# Patient Record
Sex: FEMALE | Race: WHITE
[De-identification: names, ages, dates, MRNs, and addresses within clinical notes are randomized per-mention and may not be internally consistent; named-entity substitution may affect disease eponyms.]

---

## 2018-11-19 ENCOUNTER — HOSPITAL ENCOUNTER (INPATIENT)
Dept: HOSPITAL 36 - ER | Age: 83
LOS: 17 days | Discharge: ANOTHER HOSPITAL | DRG: 870 | End: 2018-12-06
Attending: FAMILY MEDICINE | Admitting: FAMILY MEDICINE
Payer: COMMERCIAL

## 2018-11-19 DIAGNOSIS — I47.1: ICD-10-CM

## 2018-11-19 DIAGNOSIS — I13.0: ICD-10-CM

## 2018-11-19 DIAGNOSIS — F03.90: ICD-10-CM

## 2018-11-19 DIAGNOSIS — K27.4: ICD-10-CM

## 2018-11-19 DIAGNOSIS — E78.5: ICD-10-CM

## 2018-11-19 DIAGNOSIS — Z79.4: ICD-10-CM

## 2018-11-19 DIAGNOSIS — I27.20: ICD-10-CM

## 2018-11-19 DIAGNOSIS — Z22.322: ICD-10-CM

## 2018-11-19 DIAGNOSIS — N39.0: ICD-10-CM

## 2018-11-19 DIAGNOSIS — E87.6: ICD-10-CM

## 2018-11-19 DIAGNOSIS — A41.9: Primary | ICD-10-CM

## 2018-11-19 DIAGNOSIS — D64.9: ICD-10-CM

## 2018-11-19 DIAGNOSIS — G93.41: ICD-10-CM

## 2018-11-19 DIAGNOSIS — I50.31: ICD-10-CM

## 2018-11-19 DIAGNOSIS — K56.41: ICD-10-CM

## 2018-11-19 DIAGNOSIS — R13.10: ICD-10-CM

## 2018-11-19 DIAGNOSIS — K44.9: ICD-10-CM

## 2018-11-19 DIAGNOSIS — N18.9: ICD-10-CM

## 2018-11-19 DIAGNOSIS — J96.01: ICD-10-CM

## 2018-11-19 DIAGNOSIS — K80.80: ICD-10-CM

## 2018-11-19 DIAGNOSIS — E11.22: ICD-10-CM

## 2018-11-19 DIAGNOSIS — J98.01: ICD-10-CM

## 2018-11-19 DIAGNOSIS — Z99.11: ICD-10-CM

## 2018-11-19 DIAGNOSIS — J69.0: ICD-10-CM

## 2018-11-19 DIAGNOSIS — R65.20: ICD-10-CM

## 2018-11-19 LAB
ALBUMIN SERPL-MCNC: 3 GM/DL (ref 3.7–5.3)
ALBUMIN/GLOB SERPL: 1 {RATIO} (ref 1–1.8)
ALP SERPL-CCNC: 89 U/L (ref 34–104)
ALT SERPL-CCNC: 8 U/L (ref 7–52)
ANION GAP SERPL CALC-SCNC: 17.5 MMOL/L (ref 7–16)
AST SERPL-CCNC: 9 U/L (ref 13–39)
BILIRUB SERPL-MCNC: 0.2 MG/DL (ref 0.3–1)
BUN SERPL-MCNC: 69 MG/DL (ref 7–25)
CALCIUM SERPL-MCNC: 9.3 MG/DL (ref 8.6–10.3)
CHLORIDE SERPL-SCNC: 97 MEQ/L (ref 98–107)
CO2 SERPL-SCNC: 24.6 MEQ/L (ref 21–31)
CREAT SERPL-MCNC: 1.2 MG/DL (ref 0.6–1.2)
ERYTHROCYTE [DISTWIDTH] IN BLOOD BY AUTOMATED COUNT: 12.7 % (ref 11.5–20)
GLOBULIN SER-MCNC: 2.9 GM/DL
GLUCOSE SERPL-MCNC: 547 MG/DL (ref 70–105)
HCT VFR BLD CALC: 30.5 % (ref 41–60)
HGB BLD-MCNC: 10 GM/DL (ref 12–16)
INR PPP: 1 (ref 0.5–1.4)
LYMPHOCYTES # BLD MANUAL: 8 % (ref 20–50)
MAGNESIUM SERPL-MCNC: 2.2 MG/DL (ref 1.9–2.7)
MCH RBC QN AUTO: 28.8 PG (ref 27–31)
MCHC RBC AUTO-ENTMCNC: 32.7 PG (ref 28–36)
MCV RBC AUTO: 88 FL (ref 81–100)
MONOCYTES # BLD MANUAL: 3 % (ref 2–10)
NEUTROPHILS NFR BLD AUTO: 84 % (ref 40–80)
NEUTS BAND NFR BLD: 5 % (ref 0–10)
PHOSPHATE SERPL-MCNC: 3.5 MG/DL (ref 2.5–5)
PLATELET # BLD: 282 TH/CMM (ref 150–400)
PMV BLD AUTO: 9.4 FL
POTASSIUM SERPL-SCNC: 5.1 MEQ/L (ref 3.5–5.1)
PROTHROMBIN TIME: 10.4 SECONDS (ref 9.5–11.5)
RBC # BLD AUTO: 3.47 MIL/CMM (ref 3.8–5.2)
SODIUM SERPL-SCNC: 134 MEQ/L (ref 136–145)
WBC # BLD AUTO: 16.1 TH/CMM (ref 4.8–10.8)

## 2018-11-19 PROCEDURE — Z7506: HCPCS

## 2018-11-19 PROCEDURE — C9113 INJ PANTOPRAZOLE SODIUM, VIA: HCPCS

## 2018-11-19 PROCEDURE — Z7508: HCPCS

## 2018-11-19 PROCEDURE — Z7610: HCPCS

## 2018-11-19 PROCEDURE — P9016 RBC LEUKOCYTES REDUCED: HCPCS

## 2018-11-19 PROCEDURE — X6026: HCPCS

## 2018-11-19 PROCEDURE — X3401: HCPCS

## 2018-11-19 NOTE — ED PHYSICIAN CHART
ED Chief Complaint/HPI





- Patient Information


Date Seen:: 11/19/18


Time Seen:: 19:45


Chief Complaint:: coffee ground emesis


History of Present Illness:: 





coffee ground emesis


Allergies:: 


 Allergies











Allergy/AdvReac Type Severity Reaction Status Date / Time


 


No Known Allergies Allergy   Verified 11/19/18 19:30











Vitals:: 


 Vital Signs - 8 hr











  11/19/18





  19:45


 


Temp 98.6 F


 





 


RR 26


 


/97


 


O2 Sat % 95











Historian:: Medical Records


Review:: Nurse's Note Reviewed, Transfer documents Reviewed





ED Review of Systems





- Review of Systems


General/Constitutional: No fever, No chills, No weight loss, No weakness, No 

diaphoresis, No edema, No loss of appetite


Skin: No skin lesions, No rash, No bruising


Head: No headache, No light-headedness


Eyes: No loss of vision, No pain, No diplopia


ENT: No earache, No nasal drainage, No sore throat, No tinnitus


Neck: No neck pain, No swelling, No thyromegaly, No stiffness, No mass noted


Cardio Vascular: No chest pain, No palpitations, No PND, No orthopnea, No edema


Pulmonary: No SOB, No cough, No sputum, No wheezing


GI: Vomiting, Hematemesis


G/U: No dysuria, No frequency, No hematuria


Musculoskeletal: No bone or joint pain, No back pain, No muscle pain


Endocrine: No polyuria, No polydipsia


Psychiatric: No prior psych history, No depression, No anxiety, No suicidal 

ideation


Hematopoietic: No bruising, No lymphadenopathy


Allergic/Immuno: No urticaria, No angioedema


Neurological: No syncope, No focal symptoms, No weakness, No paresthesia, No 

headache, No seizure, No dizziness, No confusion, No vertigo





ED Past Medical History





- Past Medical History


Obtainable: No


Past Medical History: HTN, DM, Dyslipidemia, Dementia (anemia), Other (muscle 

weakness)


Psychiatricy History: Dementia





ED Physical Exam





- Physical Examination


General/Constitutional: Awake, Well-developed, well-nourished, Alert, No 

distress, Non-toxic appearing, Ambulatory


Head: Atraumatic


Eyes: Lids, conjuctiva normal, PERRL, EOMI


Skin: Nl inspection, No rash, No skin lesions, No ecchymosis, Well hydrated, No 

lymphadenopathy


ENMT: External ears, nose nl


Neck: Nontender, No stridor


Other Respiratory comments:: 





rhonchi in the right lower lung base only.


Cardio Vascular: RRR, No murmur, gallop, rubs, NL S1 S2


GI: No tenderness/rebounding/guarding, No organomegaly, No hernia, Normal BS's, 

Nondistended, No mass/bruits, No McBurney tenderness


Extremities: No tenderness or effusion, Full ROM, normal strength in all 

extremities, No edema


Neuro/Psych: Normal motor strength, Mood normal, No focal deficits


Misc: Normal back





ED Assessment





- Assessment


General Assessment: 





sign out given to Dr. Hammond at 7:45 p.m.





ED Septic Shock





- .


Is Septic Shock (SBP<90, OR Lactate>4 mmol\L) present?: No





- <6hrs of presentation:


Vital Signs: 


 Vital Signs - 8 hr











  11/19/18





  19:45


 


Temp 98.6 F


 





 


RR 26


 


/97


 


O2 Sat % 95














ED Reassessment (Disposition)





- Reassessment


Reassessment Condition:: Unchanged





- Diagnosis


Diagnosis:: 





Coffee ground emesis.





- Patient Disposition


Discharge/Transfer:: Acute Care w/in this hosp


Condition at Disposition:: Stable, Unchanged

## 2018-11-20 VITALS — SYSTOLIC BLOOD PRESSURE: 145 MMHG | DIASTOLIC BLOOD PRESSURE: 65 MMHG

## 2018-11-20 LAB
ALBUMIN SERPL-MCNC: 2.9 GM/DL (ref 3.7–5.3)
ALBUMIN/GLOB SERPL: 1.1 {RATIO} (ref 1–1.8)
ALP SERPL-CCNC: 74 U/L (ref 34–104)
ALT SERPL-CCNC: 7 U/L (ref 7–52)
ANION GAP SERPL CALC-SCNC: 11.9 MMOL/L (ref 7–16)
AST SERPL-CCNC: 8 U/L (ref 13–39)
BILIRUB SERPL-MCNC: 0.2 MG/DL (ref 0.3–1)
BUN SERPL-MCNC: 78 MG/DL (ref 7–25)
CALCIUM SERPL-MCNC: 8.7 MG/DL (ref 8.6–10.3)
CHLORIDE SERPL-SCNC: 110 MEQ/L (ref 98–107)
CHOLEST SERPL-MCNC: 124 MG/DL (ref ?–200)
CO2 SERPL-SCNC: 26.6 MEQ/L (ref 21–31)
CREAT SERPL-MCNC: 1.1 MG/DL (ref 0.6–1.2)
ERYTHROCYTE [DISTWIDTH] IN BLOOD BY AUTOMATED COUNT: 12.1 % (ref 11.5–20)
GLOBULIN SER-MCNC: 2.7 GM/DL
GLUCOSE SERPL-MCNC: 392 MG/DL (ref 70–105)
HCT VFR BLD CALC: 26.3 % (ref 41–60)
HDLC SERPL-MCNC: 30 MG/DL (ref 23–92)
HGB BLD-MCNC: 8.7 GM/DL (ref 12–16)
LYMPHOCYTES # BLD MANUAL: 16 % (ref 20–50)
MAGNESIUM SERPL-MCNC: 2.3 MG/DL (ref 1.9–2.7)
MCH RBC QN AUTO: 29.3 PG (ref 27–31)
MCHC RBC AUTO-ENTMCNC: 33.3 PG (ref 28–36)
MCV RBC AUTO: 88.2 FL (ref 81–100)
MONOCYTES # BLD MANUAL: 6 % (ref 2–10)
NEUTROPHILS NFR BLD AUTO: 78 % (ref 40–80)
NEUTS BAND NFR BLD: 0 % (ref 0–10)
PLATELET # BLD: 241 TH/CMM (ref 150–400)
PMV BLD AUTO: 9.5 FL
POTASSIUM SERPL-SCNC: 4.5 MEQ/L (ref 3.5–5.1)
RBC # BLD AUTO: 2.98 MIL/CMM (ref 3.8–5.2)
SODIUM SERPL-SCNC: 144 MEQ/L (ref 136–145)
TRIGL SERPL-MCNC: 126 MG/DL (ref ?–150)
WBC # BLD AUTO: 16.8 TH/CMM (ref 4.8–10.8)

## 2018-11-20 RX ADMIN — DEXTROSE AND SODIUM CHLORIDE SCH MLS/HR: 5; .45 INJECTION, SOLUTION INTRAVENOUS at 13:45

## 2018-11-20 RX ADMIN — INSULIN ASPART SCH: 100 INJECTION, SOLUTION INTRAVENOUS; SUBCUTANEOUS at 12:21

## 2018-11-20 RX ADMIN — INSULIN ASPART SCH UNITS: 100 INJECTION, SOLUTION INTRAVENOUS; SUBCUTANEOUS at 06:09

## 2018-11-20 RX ADMIN — INSULIN ASPART SCH UNITS: 100 INJECTION, SOLUTION INTRAVENOUS; SUBCUTANEOUS at 23:36

## 2018-11-20 RX ADMIN — INSULIN ASPART SCH UNITS: 100 INJECTION, SOLUTION INTRAVENOUS; SUBCUTANEOUS at 19:08

## 2018-11-20 RX ADMIN — MORPHINE SULFATE PRN MG: 2 INJECTION, SOLUTION INTRAMUSCULAR; INTRAVENOUS at 15:45

## 2018-11-20 RX ADMIN — MORPHINE SULFATE PRN MG: 2 INJECTION, SOLUTION INTRAMUSCULAR; INTRAVENOUS at 10:25

## 2018-11-20 RX ADMIN — INSULIN ASPART SCH UNITS: 100 INJECTION, SOLUTION INTRAVENOUS; SUBCUTANEOUS at 01:11

## 2018-11-20 NOTE — DIAGNOSTIC IMAGING REPORT
CHEST X-RAY: AP view



INDICATION: aspiration



COMPARISON: None



FINDINGS: Retrocardiac density is noted.  No focal consolidation or

effusions.  Cardiomegaly is noted with atherosclerosis.  Degenerative

changes of the spine are noted with scoliosis.



IMPRESSION:



Retrocardiac density probably representing a large hiatal hernia. 

Please refer to follow-up CT examination the same day for further

details



Cardiomegaly and atherosclerotic vascular disease.

## 2018-11-20 NOTE — GENERAL PROGRESS NOTE
Subjective





- Review of Systems


Service Date: 11/20/18


Events since last encounter: 





11/20/18


chart reviewed


CT scan noted


need GI evaluation, NGT and esophagram and UGI study to determine degree to 

hernia incarceration, EGD





Objective





- Results


Result Diagrams: 


 11/20/18 04:10





 11/20/18 04:10


Recent Labs: 


 Laboratory Last Values











WBC  16.8 Th/cmm (4.8-10.8)  H  11/20/18  04:10    


 


RBC  2.98 Mil/cmm (3.80-5.20)  L  11/20/18  04:10    


 


Hgb  8.7 gm/dL (12-16)  L  11/20/18  04:10    


 


Hct  26.3 % (41.0-60)  L  11/20/18  04:10    


 


MCV  88.2 fl ()   11/20/18  04:10    


 


MCH  29.3 pg (27.0-31.0)   11/20/18  04:10    


 


MCHC Differential  33.3 pg (28.0-36.0)   11/20/18  04:10    


 


RDW  12.1 % (11.5-20.0)   11/20/18  04:10    


 


Plt Count  241 Th/cmm (150-400)   11/20/18  04:10    


 


MPV  9.5 fl  11/20/18  04:10    


 


Add Manual Diff  YES   11/20/18  04:10    


 


Neutrophils %  NP   11/19/18  19:40    


 


Band Neutrophils %  0 % (0-10)   11/20/18  04:10    


 


Lymphocytes %  NP   11/19/18  19:40    


 


Monocytes %  NP   11/19/18  19:40    


 


Eosinophils %  NP   11/19/18  19:40    


 


Basophils %  NP   11/19/18  19:40    


 


Neutrophils (Manual)  78 % (40-80)   11/20/18  04:10    


 


Lymphocytes  16 % (20-50)  L  11/20/18  04:10    


 


Monocytes  6 % (2-10)   11/20/18  04:10    


 


PT  10.4 SECONDS (9.5-11.5)   11/19/18  19:40    


 


INR  1.00  (0.5-1.4)   11/19/18  19:40    


 


PTT (Actin FS)  22.1 SECONDS (26.0-38.0)  L  11/19/18  19:40    


 


Sodium  144 mEq/L (136-145)   11/20/18  04:10    


 


Potassium  4.5 mEq/L (3.5-5.1)   11/20/18  04:10    


 


Chloride  110 mEq/L ()  H  11/20/18  04:10    


 


Carbon Dioxide  26.6 mEq/L (21.0-31.0)   11/20/18  04:10    


 


Anion Gap  11.9  (7.0-16.0)   11/20/18  04:10    


 


BUN  78 mg/dL (7-25)  H  11/20/18  04:10    


 


Creatinine  1.1 mg/dL (0.6-1.2)   11/20/18  04:10    


 


Est GFR ( Amer)  TNP   11/20/18  04:10    


 


Est GFR (Non-Af Amer)  TNP   11/20/18  04:10    


 


BUN/Creatinine Ratio  70.9   11/20/18  04:10    


 


Glucose  392 mg/dL ()  H D 11/20/18  04:10    


 


POC Glucose  304 MG/DL (70 - 105)  H  11/20/18  06:07    


 


Whole Bld Lactic Acid  2.46 mmol/L (0.60-1.99)  H*  11/20/18  10:07    


 


Calcium  8.7 mg/dL (8.6-10.3)   11/20/18  04:10    


 


Phosphorus  3.5 mg/dL (2.5-5.0)   11/19/18  19:40    


 


Magnesium  2.3 mg/dL (1.9-2.7)   11/20/18  04:10    


 


Total Bilirubin  0.2 mg/dL (0.3-1.0)  L  11/20/18  04:10    


 


AST  8 U/L (13-39)  L  11/20/18  04:10    


 


ALT  7 U/L (7-52)   11/20/18  04:10    


 


Alkaline Phosphatase  74 U/L ()   11/20/18  04:10    


 


Total Protein  5.6 gm/dL (6.0-8.3)  L  11/20/18  04:10    


 


Albumin  2.9 gm/dL (3.7-5.3)  L  11/20/18  04:10    


 


Globulin  2.7 gm/dL  11/20/18  04:10    


 


Albumin/Globulin Ratio  1.1  (1.0-1.8)   11/20/18  04:10    


 


Triglycerides  126 mg/dL (<150)   11/20/18  04:10    


 


Cholesterol  124 mg/dL (<200)   11/20/18  04:10    


 


LDL Cholesterol Direct  84 mg/dL ()   11/20/18  04:10    


 


HDL Cholesterol  30 mg/dL (23-92)   11/20/18  04:10    


 


TSH  0.75 uIU/ml (0.34-5.60)   11/20/18  04:10    


 


Blood Type  O POSITIVE   11/19/18  19:40    


 


Antibody Screen  NEGATIVE   11/19/18  19:40    














- Physical Exam


Vitals and I&O: 


 Vital Signs











Temp  96.9 F   11/20/18 10:00


 


Pulse  84   11/20/18 10:00


 


Resp  18   11/20/18 10:00


 


BP  160/58   11/20/18 10:00


 


Pulse Ox  100   11/20/18 10:00








 Intake & Output











 11/19/18 11/20/18 11/20/18





 18:59 06:59 18:59


 


Intake Total  250 


 


Balance  250 


 


Weight (lbs)  64.682 kg 


 


Intake:   


 


  Intake, IV Amount  250 


 


    Azithromycin 500 mg In  250 





    Sodium Chloride 0.9% 250   





    ml @ 250 mls/hr IV Q24HR   





    [facility] Rx#:[id number]   


 


Other:   


 


  # Voids  2 


 


  Stool Characteristics  Brown Brown


 


  Weight Source  Bedscale 











Active Medications: 


Current Medications





Pantoprazole Sodium 80 mg/ (Sodium Chloride)  100 mls @ 10 mls/hr IV Q10H [facility]


   Stop: 01/18/19 19:44


   Last Admin: 11/19/18 22:57 Dose:  10 mls/hr


Azithromycin 500 mg/ Sodium (Chloride)  250 mls @ 250 mls/hr IV Q24HR [facility]


   Stop: 01/18/19 22:29


   Last Infusion: 11/20/18 00:30 Dose:  Infused


Ceftriaxone Sodium 1 gm/ (Sodium Chloride)  50 mls @ 100 mls/hr IV Q24HR [facility]


   Stop: 01/19/19 20:59


Sodium Chloride (Nacl 0.9%)  1,000 mls @ 150 mls/hr IV .Q6H40M [facility]


   Stop: 01/19/19 05:59


   Last Admin: 11/20/18 06:11 Dose:  150 mls/hr


Insulin Aspart (Novolog Insulin Sliding Scale)  0 units SUBQ Q6HR [facility]; Protocol


   Stop: 01/19/19 01:14


   Last Admin: 11/20/18 06:09 Dose:  9 units


Morphine Sulfate (Morphine)  1 mg IVP Q4HR PRN


   PRN Reason: Pain (Moderate)


   Stop: 01/19/19 10:04











Assessment/Plan





- Problem List


Patient Problems: 


All Active Problems





COFFEE GROUND EMESIS (Acute)

## 2018-11-20 NOTE — DIAGNOSTIC IMAGING REPORT
CT abdomen and pelvis without intravenous contrast



Indication: Hematemesis



Comparison: CT chest the same day,



Technique: Axial images were obtained from the lung bases to the

bilateral proximal femurs without IV contrast.  Coronal reconstructions

were made.  total DLP: 4 97,  CTDI10.6



FINDINGS:



Hypoventilatory and atelectatic changes of the lungs are noted.

Evaluation of the solid organs is limited due to lack of IV contrast. 

Large retrocardiac hiatal hernia is noted.  Evaluation of the solid

organs is limited due to lack of IV contrast.  Exam is also mildly

limited due to motion.  No focal hepatic or splenic lesions.  



There is a 4 mm stone along the neck of the gallbladder.  No focal

pancreatic or adrenal lesions.  Exophytic right renal cysts are noted

measuring up to 1.1 cm.  No hydronephrosis or renal stones.  Uterine

calcifications are noted.  



There is marked distal fecal impaction with mass effect upon the uterus

and urinary bladder.  There is copious amount of stool throughout the

colon.  Minimal diverticulosis is noted without evidence of

diverticulitis.  No appendicitis.  No free fluid or free air.  Heavy

atherosclerotic vascular disease is noted.  Severe degenerative changes

of the spine are noted with severe spinal scoliosis.



IMPRESSION:



Large retrocardiac hernia.



Marked distal fecal impaction with mass effect upon the uterus and

urinary bladder.  There is also copious stool throughout the colon. 

Correlate clinically for constipation.



Minimal diverticulosis without evidence of diverticulitis



4 mm stone along the gallbladder neck.  No evidence of surrounding

inflammatory changes.  Consider further assessment with ultrasound.



Severe degenerative changes and spinal scoliosis



Atherosclerotic vascular disease.

## 2018-11-20 NOTE — HISTORY AND PHYSICAL
History of Present Illness





- HPI


Chief Complaint: 





coffee ground emesis  


HPI: 





89 y/o female nursing home resident admitted to the ICU due to 1 day hx of 

coffee ground emesis. In the ER patient was found to have elevated lactic acid, 

sepsis protocol initiated. 


Vital Signs: 





 Last Vital Signs











Temp  98.3 F   11/20/18 20:00


 


Pulse  84   11/20/18 20:00


 


Resp  20   11/20/18 20:00


 


BP  119/86   11/20/18 20:00


 


Pulse Ox  100   11/20/18 20:00














Past Medical History


Other History: 





HTN, DM, Dyslipidemia, Dementia (anemia), Other (muscle weakness)





Family Medical History





- Family Member


  ** Mother


History Unknown: Yes





Social History


Smoke: No


Alcohol: None


Drugs: None


Lives: Nursing Home





- Medications


Home Medications: 


Home Medication











 Medication  Instructions  Recorded  Type


 


Acetaminophen [Tylenol] 650 mg PO Q4H PRN 11/19/18 History


 


Acetaminophen [Tylenol] 650 mg PO Q4H PRN 11/19/18 History


 


Amlodipine Besylate 5 mg PO DAILY 11/19/18 History


 


Ascorbic Acid 500 mg PO DAILY 11/19/18 History


 


Bisacodyl [Dulcolax 10 Mg Supp] 10 mg RC DAILY PRN 11/19/18 History


 


Calcium Carbonate/Vitamin D3 1 each PO DAILY 11/19/18 History





[Calcium 500-Vit D3 200 Caplet]   


 


Clonidine HCl [Catapres] 0.1 mg PO Q8H PRN 11/19/18 History


 


Clopidogrel [Plavix] 75 mg PO DAILY 11/19/18 History


 


Dextrose [Glucose Gel] 15 gm PO PRN PRN 11/19/18 History


 


Docusate Sodium [Colace] 100 mg PO BID 11/19/18 History


 


Ferrous Sulfate 325 mg PO DAILY 11/19/18 History


 


Fleet Enema 135 ml RC DAILY PRN 11/19/18 History


 


Glucagon,Human Recombinant 1 mg IJ PRN PRN 11/19/18 History





[Glucagon Emergency Kit]   


 


Insulin Human Regular [NovoLIN R] 0 units SUBQ DAILY 11/19/18 History


 


Isosorbide Mononitrate [Isosorbide 60 mg PO DAILY 11/19/18 History





Mononitrate ER]   


 


L. Acidophilus/L.bulgaricus 1 each PO DAILY 11/19/18 History





[Floranex Granules Packet]   


 


Lactulose 20 gm PO BID 11/19/18 History


 


Magnesium Hydroxide [Milk of 30 ml PO DAILY PRN 11/19/18 History





Magnesia]   


 


Memantine [Namenda] 5 mg PO DAILY 11/19/18 History


 


Metoprolol Tartrate 25 mg PO DAILY 11/19/18 History


 


Multivitamin w/ Minerals 1 tab PO DAILY 11/19/18 History





[Theragran M]   


 


Mylanta Ultimate Strenght  30 ml PO Q6H PRN 11/19/18 History


 


Nitroglycerin [Nitrostat] 0.4 mg SL Q5MIN PRN 11/19/18 History


 


Pantoprazole [Protonix] 40 mg PO DAILY 11/19/18 History


 


Polyethylene Glycol 3350 17 gm PO DAILY 11/19/18 History


 


Sitagliptin Phosphate [Januvia] 100 mg PO DAILY 11/19/18 History














- Allergies


Allergies/Adverse Reactions: 


 Allergies











Allergy/AdvReac Type Severity Reaction Status Date / Time


 


No Known Allergies Allergy   Verified 11/19/18 19:30














Review of Systems





- Review of Systems


Constitutional: Report: Weakness


Eyes: Report: No Significant


Respiratory: Report: Cough


Cardiovascular: Report: No Significant


Gastrointestinal: Report: Vomiting


Neurological: Report: Weakness





Physical Exam





- Physical Exam


HEENT: Report: Ears Nose Throat within normal limits


Neck: Report: Within normal limits


Cardiovascular Systems: Report: Regular, Rate and Rhythm


Respiratory: Report: Breath Sounds are within normal limits


Abdomen: Report: Non-tender to palpation


Extremities: Report: Non-tender to palpation.


Skin: Report: Color of skin is within normal limits, Warm, Dry


Neuro/Psych: Report: Weakness or sensory loss noted.





- Lab Results


All Lab Results last 24 hours: 





 Laboratory Results - last 24 hr











  11/19/18 11/19/18 11/19/18





  22:32 22:36 22:40


 


WBC   


 


RBC   


 


Hgb   


 


Hct   


 


MCV   


 


MCH   


 


MCHC Differential   


 


RDW   


 


Plt Count   


 


MPV   


 


Add Manual Diff   


 


Band Neutrophils %   


 


Neutrophils (Manual)   


 


Lymphocytes   


 


Monocytes   


 


Sodium   


 


Potassium   


 


Chloride   


 


Carbon Dioxide   


 


Anion Gap   


 


BUN   


 


Creatinine   


 


Est GFR ( Amer)   


 


Est GFR (Non-Af Amer)   


 


BUN/Creatinine Ratio   


 


Glucose   


 


POC Glucose  485 H*  441 H 


 


Whole Bld Lactic Acid    2.89 H*


 


Calcium   


 


Magnesium   


 


Total Bilirubin   


 


AST   


 


ALT   


 


Alkaline Phosphatase   


 


Total Protein   


 


Albumin   


 


Globulin   


 


Albumin/Globulin Ratio   


 


Triglycerides   


 


Cholesterol   


 


LDL Cholesterol Direct   


 


HDL Cholesterol   


 


TSH   














  11/20/18 11/20/18 11/20/18





  00:09 01:00 04:10


 


WBC    16.8 H


 


RBC    2.98 L


 


Hgb    8.7 L


 


Hct    26.3 L


 


MCV    88.2


 


MCH    29.3


 


MCHC Differential    33.3


 


RDW    12.1


 


Plt Count    241


 


MPV    9.5


 


Add Manual Diff    YES


 


Band Neutrophils %    0


 


Neutrophils (Manual)    78


 


Lymphocytes    16 L


 


Monocytes    6


 


Sodium   


 


Potassium   


 


Chloride   


 


Carbon Dioxide   


 


Anion Gap   


 


BUN   


 


Creatinine   


 


Est GFR ( Amer)   


 


Est GFR (Non-Af Amer)   


 


BUN/Creatinine Ratio   


 


Glucose   


 


POC Glucose  423 H  403 H 


 


Whole Bld Lactic Acid   


 


Calcium   


 


Magnesium   


 


Total Bilirubin   


 


AST   


 


ALT   


 


Alkaline Phosphatase   


 


Total Protein   


 


Albumin   


 


Globulin   


 


Albumin/Globulin Ratio   


 


Triglycerides   


 


Cholesterol   


 


LDL Cholesterol Direct   


 


HDL Cholesterol   


 


TSH   














  11/20/18 11/20/18 11/20/18





  04:10 04:10 06:07


 


WBC   


 


RBC   


 


Hgb   


 


Hct   


 


MCV   


 


MCH   


 


MCHC Differential   


 


RDW   


 


Plt Count   


 


MPV   


 


Add Manual Diff   


 


Band Neutrophils %   


 


Neutrophils (Manual)   


 


Lymphocytes   


 


Monocytes   


 


Sodium  144  


 


Potassium  4.5  


 


Chloride  110 H  


 


Carbon Dioxide  26.6  


 


Anion Gap  11.9  


 


BUN  78 H  


 


Creatinine  1.1  


 


Est GFR ( Amer)  TNP  


 


Est GFR (Non-Af Amer)  TNP  


 


BUN/Creatinine Ratio  70.9  


 


Glucose  392 H D  


 


POC Glucose    304 H


 


Whole Bld Lactic Acid   


 


Calcium  8.7  


 


Magnesium  2.3  


 


Total Bilirubin  0.2 L  


 


AST  8 L  


 


ALT  7  


 


Alkaline Phosphatase  74  


 


Total Protein  5.6 L  


 


Albumin  2.9 L  


 


Globulin  2.7  


 


Albumin/Globulin Ratio  1.1  


 


Triglycerides  126  


 


Cholesterol  124  


 


LDL Cholesterol Direct  84  


 


HDL Cholesterol  30  


 


TSH   0.75 














  11/20/18 11/20/18 11/20/18





  07:50 10:07 19:06


 


WBC   


 


RBC   


 


Hgb   


 


Hct   


 


MCV   


 


MCH   


 


MCHC Differential   


 


RDW   


 


Plt Count   


 


MPV   


 


Add Manual Diff   


 


Band Neutrophils %   


 


Neutrophils (Manual)   


 


Lymphocytes   


 


Monocytes   


 


Sodium   


 


Potassium   


 


Chloride   


 


Carbon Dioxide   


 


Anion Gap   


 


BUN   


 


Creatinine   


 


Est GFR ( Amer)   


 


Est GFR (Non-Af Amer)   


 


BUN/Creatinine Ratio   


 


Glucose   


 


POC Glucose    236 H


 


Whole Bld Lactic Acid  2.88 H*  2.46 H* 


 


Calcium   


 


Magnesium   


 


Total Bilirubin   


 


AST   


 


ALT   


 


Alkaline Phosphatase   


 


Total Protein   


 


Albumin   


 


Globulin   


 


Albumin/Globulin Ratio   


 


Triglycerides   


 


Cholesterol   


 


LDL Cholesterol Direct   


 


HDL Cholesterol   


 


TSH   








 Microbiology











 11/19/18 19:40  - Preliminary





 Blood    NO GROWTH AFTER 24 HOURS














- Assessment


Assessment: 





 Current Active Problems











Problem Status Onset


 


COFFEE GROUND EMESIS Acute 








 r/o gi bleed 


Large hiatal hernia


lactic acidosis possible sepsis


htn


 DM


Dyslipidemia


Dementia 





- Plan


Plan: 





id consult


surgical consult


ivabx as ordered


gi consult 


cpm

## 2018-11-20 NOTE — DIAGNOSTIC IMAGING REPORT
CT Chest without IV contrast



HISTORY: Pneumonia



COMPARISON: CT abdomen and pelvis the same day.



Technique: Axial images were obtained from the base of the neck to the

upper abdomen without IV contrast.  Reconstructions were made.  Total

, CTDI 8.4



Findings:



Exam is limited due to lack of IV contrast.  No evidence of mediastinal

lymphadenopathy.  Motion also limits exam.  Extensive atherosclerosis is

noted with coronary artery calcifications.  No aneurysm.  Heart size is

at the upper limits of normal.  No pericardial effusion.  There is a

large retrocardiac hiatal hernia.



The lung fields demonstrate chronic lung changes with hypoventilatory

and atelectatic changes.  There is focal area of consolidative change

involving the posterior aspect of the right lung base.  Small focal left

basal consolidative changes noted.  No pleural effusions.  Degenerative

changes of the spine are noted with marked scoliosis and rightward

convexity of the thoracic spine.



IMPRESSION:



Focal right posterior basal consolidative changes probably related to

pneumonia.  Short-term follow-up is recommended to ensure resolution and

rule out left leg neoplastic process



Additional minimal left basal consolidative changes which may also be

related to focal infiltrate/pneumonia.  Again follow-up is needed to

ensure resolution.



Large retrocardiac hiatal hernia



Heavy atherosclerotic vascular disease.

## 2018-11-20 NOTE — CONSULTATION
DATE OF CONSULTATION:     11/20/2018



INFECTIOUS DISEASE CONSULTATION



REFERRING PHYSICIAN:   Zack Ivey MD



REASON FOR CONSULTATION:  Sepsis, pneumonia.



HISTORY OF PRESENT ILLNESS:  The patient is an 88-year-old female with a past

medical history of hypertension, muscle weakness, dysphagia, and diabetes

mellitus type 2, brought in from [facility] for vomiting

coffee-ground emesis.  On initial evaluation, the patient's temperature was 98.6

degrees Fahrenheit and WBC count was 16,000.  Sepsis workup was performed and

lactic acid was elevated.  ID consult was called for further evaluation and

management.  Meanwhile, CT scan of the chest reveal a large hiatal hernia along

with bilateral pneumonia.  Antibiotic wise, the patient was started on Rocephin

and Zithromax.



PAST MEDICAL HISTORY:  As mentioned above, hypertension, diabetes mellitus type

2, muscle weakness, dysphagia.



ALLERGIES:  NKDA.



MEDICATIONS:  As per medication reconciliation sheet.  Antibiotic wise, the

patient is receiving Rocephin and Zithromax.



FAMILY HISTORY:  Not available.



SOCIAL HISTORY:  The patient lives in a nursing facility.  No history of

smoking, alcohol or drug use.



REVIEW OF SYSTEMS:  The patient is a poor historian, unable to give any history.

 The patient might have dementia.



PHYSICAL EXAMINATION:

VITAL SIGNS:  Shows temperature is 96.9, pulse 84, respirations 18, blood

pressure 160/58.

GENERAL:  The patient is comfortable, lying in the bed, not in acute distress.

HEENT:  Head is normocephalic and atraumatic.  Oral cavity moist, pink tongue. 

Eyes:  Pallor is present, no icterus.  PERRLA, EOMI.

NECK:  Supple, no JVD, no carotid bruit.  Trachea in midline.

CHEST:  Bilateral breath sounds.  No crackles or wheezing.  Decreased breath

sounds.

HEART:  S1, S2 within normal limits.  Regular rhythm.  No murmur, no gallop.

ABDOMEN:  Soft, nontender, nondistended.  Bowel sounds present.

EXTREMITIES:  No cyanosis, no clubbing, no edema.

NEUROLOGIC:  Arousable, but unable to communicate at this time.



LABORATORY DATA:  Current lab shows WBC count is 16,800, hemoglobin 8.7,

hematocrit 26.3, platelets are 241,000, neutrophils 78%, lymphocytes 16%. 

Sodium 144, potassium 4.5, chloride 110, bicarbonate is 26.6, BUN is 78,

creatinine 1.1, glucose is 392.  Lactic acid is 2.46.  CT scan of the chest,

abdomen and pelvis are reviewed and it showed bilateral consolidation and large

left retrocardiac hiatal hernia.  The patient has a 4 mm stone along the

gallbladder neck.  No evidence of surrounding inflammatory changes.  Marked

distal fecal impaction with mass effect.



IMPRESSION:

1.  Severe sepsis.

2.  Pneumonia.

3.  Fecal impaction.

4.  Gallbladder stone in bladder neck.

5.  Diabetes mellitus type 2.

6.  Hypertension.

7.  Dementia.

8.  Anemia.



RECOMMENDATIONS:  Continue Zithromax.  Change Rocephin to Zosyn. 

Depending on the culture report and available data,  define further

antibiotic therapy.



Thank you, Dr. Ivey for involving me in taking care of this patient.





DD: 11/20/2018 11:45

DT: 11/20/2018 23:32

JOB# [id number]  [id number]

GREGORY

## 2018-11-20 NOTE — DIAGNOSTIC IMAGING REPORT
CHEST X-RAY: AP view



INDICATION: NG tube placement



COMPARISON: Chest x-ray and chest CT on 11/19/2019



FINDINGS: An NG tube is seen curled in a large hiatal hernia.  No focal

consolidation or effusions.  Cardiomegaly is noted with atherosclerosis.



IMPRESSION:



NG tube curled in the large retrocardiac hiatal hernia.  Clinical

correlation is needed.



No focal consolidation identified.

## 2018-11-21 LAB
ALBUMIN SERPL-MCNC: 2.6 GM/DL (ref 3.7–5.3)
ALBUMIN/GLOB SERPL: 1.1 {RATIO} (ref 1–1.8)
ALP SERPL-CCNC: 55 U/L (ref 34–104)
ALT SERPL-CCNC: 8 U/L (ref 7–52)
ANION GAP SERPL CALC-SCNC: 9.8 MMOL/L (ref 7–16)
AST SERPL-CCNC: 28 U/L (ref 13–39)
BASOPHILS NFR BLD: 1 % (ref 0–3)
BILIRUB SERPL-MCNC: 0.2 MG/DL (ref 0.3–1)
BUN SERPL-MCNC: 45 MG/DL (ref 7–25)
CALCIUM SERPL-MCNC: 8.4 MG/DL (ref 8.6–10.3)
CHLORIDE SERPL-SCNC: 115 MEQ/L (ref 98–107)
CO2 SERPL-SCNC: 26.1 MEQ/L (ref 21–31)
CREAT SERPL-MCNC: 1.1 MG/DL (ref 0.6–1.2)
EOSINOPHIL NFR BLD: 3 % (ref 0–5)
ERYTHROCYTE [DISTWIDTH] IN BLOOD BY AUTOMATED COUNT: 12.7 % (ref 11.5–20)
GLOBULIN SER-MCNC: 2.4 GM/DL
GLUCOSE SERPL-MCNC: 287 MG/DL (ref 70–105)
HCT VFR BLD CALC: 21.1 % (ref 41–60)
HGB BLD-MCNC: 7.1 GM/DL (ref 12–16)
INR PPP: 1.02 (ref 0.5–1.4)
LYMPHOCYTES # BLD MANUAL: 27 % (ref 20–50)
MAGNESIUM SERPL-MCNC: 2.2 MG/DL (ref 1.9–2.7)
MCH RBC QN AUTO: 29.3 PG (ref 27–31)
MCHC RBC AUTO-ENTMCNC: 33.5 PG (ref 28–36)
MCV RBC AUTO: 87.7 FL (ref 81–100)
MONOCYTES # BLD MANUAL: 6 % (ref 2–10)
NEUTROPHILS NFR BLD AUTO: 58 % (ref 40–80)
NEUTS BAND NFR BLD: 5 % (ref 0–10)
PHOSPHATE SERPL-MCNC: 2.3 MG/DL (ref 2.5–5)
PLATELET # BLD EST: ADEQUATE 10*3/UL
PLATELET # BLD: 257 TH/CMM (ref 150–400)
PMV BLD AUTO: 8.7 FL
POTASSIUM SERPL-SCNC: 3.9 MEQ/L (ref 3.5–5.1)
PROTHROMBIN TIME: 10.6 SECONDS (ref 9.5–11.5)
RBC # BLD AUTO: 2.4 MIL/CMM (ref 3.8–5.2)
SODIUM SERPL-SCNC: 147 MEQ/L (ref 136–145)
WBC # BLD AUTO: 14.9 TH/CMM (ref 4.8–10.8)

## 2018-11-21 PROCEDURE — 0DB78ZX EXCISION OF STOMACH, PYLORUS, VIA NATURAL OR ARTIFICIAL OPENING ENDOSCOPIC, DIAGNOSTIC: ICD-10-PCS

## 2018-11-21 PROCEDURE — 0W3P8ZZ CONTROL BLEEDING IN GASTROINTESTINAL TRACT, VIA NATURAL OR ARTIFICIAL OPENING ENDOSCOPIC: ICD-10-PCS

## 2018-11-21 RX ADMIN — DEXTROSE AND SODIUM CHLORIDE SCH MLS/HR: 5; .45 INJECTION, SOLUTION INTRAVENOUS at 01:19

## 2018-11-21 RX ADMIN — IPRATROPIUM BROMIDE AND ALBUTEROL SULFATE SCH ML: .5; 3 SOLUTION RESPIRATORY (INHALATION) at 22:38

## 2018-11-21 RX ADMIN — INSULIN ASPART SCH: 100 INJECTION, SOLUTION INTRAVENOUS; SUBCUTANEOUS at 06:10

## 2018-11-21 RX ADMIN — INSULIN ASPART SCH UNITS: 100 INJECTION, SOLUTION INTRAVENOUS; SUBCUTANEOUS at 17:35

## 2018-11-21 RX ADMIN — DEXTROSE AND SODIUM CHLORIDE SCH MLS/HR: 5; .45 INJECTION, SOLUTION INTRAVENOUS at 11:39

## 2018-11-21 RX ADMIN — DEXTROSE AND SODIUM CHLORIDE SCH MLS/HR: 5; .45 INJECTION, SOLUTION INTRAVENOUS at 17:34

## 2018-11-21 RX ADMIN — Medication SCH: at 09:00

## 2018-11-21 RX ADMIN — MORPHINE SULFATE PRN MG: 2 INJECTION, SOLUTION INTRAMUSCULAR; INTRAVENOUS at 12:24

## 2018-11-21 RX ADMIN — INSULIN ASPART SCH UNITS: 100 INJECTION, SOLUTION INTRAVENOUS; SUBCUTANEOUS at 12:59

## 2018-11-21 RX ADMIN — IPRATROPIUM BROMIDE AND ALBUTEROL SULFATE SCH ML: .5; 3 SOLUTION RESPIRATORY (INHALATION) at 18:31

## 2018-11-21 RX ADMIN — IPRATROPIUM BROMIDE AND ALBUTEROL SULFATE SCH ML: .5; 3 SOLUTION RESPIRATORY (INHALATION) at 14:46

## 2018-11-21 NOTE — INFECTIOUS DISEASE PROG NOTE
Infectious Disease Subjective





- Review of Systems


Service Date: 18


Subjective: 





NO new change, no fever.





Infectious Disease Objective





- Results


Result Diagrams: 


 18 04:45





 18 04:45


Recent Labs: 


 Laboratory Last Values











WBC  14.9 Th/cmm (4.8-10.8)  H  18  04:45    


 


RBC  2.40 Mil/cmm (3.80-5.20)  L  18  04:45    


 


Hgb  7.1 gm/dL (12-16)  L*  18  04:45    


 


Hct  21.1 % (41.0-60)  L  18  04:45    


 


MCV  87.7 fl ()   18  04:45    


 


MCH  29.3 pg (27.0-31.0)   18  04:45    


 


MCHC Differential  33.5 pg (28.0-36.0)   18  04:45    


 


RDW  12.7 % (11.5-20.0)   18  04:45    


 


Plt Count  257 Th/cmm (150-400)   18  04:45    


 


MPV  8.7 fl  18  04:45    


 


Add Manual Diff  YES   18  04:45    


 


Neutrophils %  NP   18  19:40    


 


Band Neutrophils %  5 % (0-10)   18  04:45    


 


Lymphocytes %  NP   18  19:40    


 


Monocytes %  NP   18  19:40    


 


Eosinophils %  NP   18  19:40    


 


Basophils %  NP   18  19:40    


 


Neutrophils (Manual)  58 % (40-80)   18  04:45    


 


Lymphocytes  27 % (20-50)   18  04:45    


 


Monocytes  6 % (2-10)   18  04:45    


 


Eosinophils  3 % (0-5)   18  04:45    


 


Basophils  1 % (0-3)   18  04:45    


 


Platelet Estimate  ADEQUATE  (NORMAL)   18  04:45    


 


PT  10.6 SECONDS (9.5-11.5)   18  04:45    


 


INR  1.02  (0.5-1.4)   18  04:45    


 


PTT (Actin FS)  22.1 SECONDS (26.0-38.0)  L  18  19:40    


 


Sodium  147 mEq/L (136-145)  H  18  04:45    


 


Potassium  3.9 mEq/L (3.5-5.1)   18  04:45    


 


Chloride  115 mEq/L ()  H  18  04:45    


 


Carbon Dioxide  26.1 mEq/L (21.0-31.0)   18  04:45    


 


Anion Gap  9.8  (7.0-16.0)   18  04:45    


 


BUN  45 mg/dL (7-25)  H  18  04:45    


 


Creatinine  1.1 mg/dL (0.6-1.2)   18  04:45    


 


Est GFR ( Amer)  TNP   18  04:45    


 


Est GFR (Non-Af Amer)  TNP   18  04:45    


 


BUN/Creatinine Ratio  40.9   18  04:45    


 


Glucose  287 mg/dL ()  H  18  04:45    


 


POC Glucose  314 MG/DL (70 - 105)  H  18  17:00    


 


Whole Bld Lactic Acid  2.46 mmol/L (0.60-1.99)  H*  18  10:07    


 


Calcium  8.4 mg/dL (8.6-10.3)  L  18  04:45    


 


Phosphorus  2.3 mg/dL (2.5-5.0)  L  18  04:45    


 


Magnesium  2.2 mg/dL (1.9-2.7)   18  04:45    


 


Total Bilirubin  0.2 mg/dL (0.3-1.0)  L  18  04:45    


 


AST  28 U/L (13-39)   18  04:45    


 


ALT  8 U/L (7-52)   18  04:45    


 


Alkaline Phosphatase  55 U/L ()   18  04:45    


 


Total Protein  5.0 gm/dL (6.0-8.3)  L  18  04:45    


 


Albumin  2.6 gm/dL (3.7-5.3)  L  18  04:45    


 


Globulin  2.4 gm/dL  18  04:45    


 


Albumin/Globulin Ratio  1.1  (1.0-1.8)   18  04:45    


 


Triglycerides  126 mg/dL (<150)   18  04:10    


 


Cholesterol  124 mg/dL (<200)   18  04:10    


 


LDL Cholesterol Direct  84 mg/dL ()   18  04:10    


 


HDL Cholesterol  30 mg/dL (23-92)   18  04:10    


 


TSH  0.75 uIU/ml (0.34-5.60)   18  04:10    


 


Blood Type  O POSITIVE   18  19:40    


 


Antibody Screen  NEGATIVE   18  19:40    














- Physical Exam


Vitals and I&O: 


 Vital Signs











Temp  98.3 F   18 20:00


 


Pulse  98   18 22:38


 


Resp  13   18 22:38


 


BP  151/59   18 22:00


 


Pulse Ox  97   18 22:38








 Intake & Output











 18





 06:59 18:59 06:59


 


Intake Total 1968.333 500.000 50


 


Output Total 850 1000 


 


Balance 1118.333 -500.000 50


 


Weight (lbs) 65.181 kg 64.864 kg 


 


Intake:   


 


  Intake, IV Amount 1968.333 500.000 50


 


    Azithromycin 500 mg In 250  





    Sodium Chloride 0.9% 250   





    ml @ 250 mls/hr IV Q24HR   





    PATRICE Rx#:[id number]   


 


    D5-0.45NS 1,000 ml @ 100 1468.333 400 





    mls/hr IV .Q10H PAT[facility]ICE Rx#:   





    [id number]   


 


    Pantoprazole 80 mg In 200 100.000 





    Sodium Chloride 0.9% 100   





    ml @ 10 mls/hr IV Q10H   





    PATRICE Rx#:[id number]   


 


    cefTRIAXone 1 gm In 50  50





    Sodium Chloride 0.9% 50   





    ml @ 100 mls/hr IV Q24HR   





    PATRICE Rx#:[id number]   


 


Output:   


 


  Urine 850 1000 


 


Other:   


 


  # Bowel Movements  2 


 


  Weight Source Bedscale Bedscale 











Active Medications: 


Current Medications





Acetaminophen (Tylenol)  650 mg PO Q4HR PRN


   PRN Reason: MILD PAIN 1-3


   Stop: 19 17:58


Acetaminophen (Tylenol)  650 mg PO Q4HR PRN


   PRN Reason: TEMP >100.4


   Stop: 19 17:58


Albuterol/Ipratropium (Duoneb Neb)  3 ml HHN Q4HRT [facility]


   Stop: 19 14:59


   Last Admin: 18 22:38 Dose:  3 ml


Amlodipine Besylate (Norvasc)  5 mg PO DAILY [facility]


   Stop: 19 08:59


   Last Admin: 18 09:00 Dose:  Not Given


Ascorbic Acid (Vitamin C)  500 mg PO DAILY [facility]


   Stop: 19 08:59


   Last Admin: 18 09:00 Dose:  Not Given


Bisacodyl (Dulcolax 10 Mg Supp)  10 mg RC DAILY PRN


   PRN Reason: IF MOM INEFFECTIVE


   Stop: 19 17:58


Pantoprazole Sodium 80 mg/ (Sodium Chloride)  100 mls @ 10 mls/hr IV Q10H [facility]


   Stop: 19 19:44


   Last Admin: 18 18:07 Dose:  10 mls/hr


Azithromycin 500 mg/ Sodium (Chloride)  250 mls @ 250 mls/hr IV Q24HR [facility]


   Stop: 19 22:29


   Last Admin: 18 22:59 Dose:  250 mls/hr


Ceftriaxone Sodium 1 gm/ (Sodium Chloride)  50 mls @ 100 mls/hr IV Q24HR [facility]


   Stop: 19 20:59


   Last Infusion: 18 22:05 Dose:  Infused


Dextrose/Sodium Chloride (D5-0.45ns)  1,000 mls @ 50 mls/hr IV .Q20H [facility]


   Stop: 19 17:29


   Last Admin: 18 17:34 Dose:  50 mls/hr


Insulin Aspart (Novolog Insulin Sliding Scale)  0 units SUBQ Q6HR [facility]; Protocol


   Stop: 19 01:14


   Last Admin: 18 17:35 Dose:  9 units


Lactobacillus Rhamnosus (Culturelle 15b)  1 each PO DAILY [facility]


   Stop: 19 08:59


   Last Admin: 18 09:00 Dose:  Not Given


Miscellaneous (Probiotic Screen)  1 ea MC PRN PRN


   PRN Reason: PROTOCOL


   Stop: 19 15:29


Morphine Sulfate (Morphine)  1 mg IVP Q4HR PRN


   PRN Reason: Pain (Moderate)


   Stop: 19 10:04


   Last Admin: 18 12:24 Dose:  1 mg


Mupirocin (Bactroban Oint)  1 appl NS BID [facility]


   Stop: 18 17:01








General: no acute distress, well developed, well nourished


HEENT: atraumatic, normocephalic, PERRLA


Neck: supple, no thyromegaly


Cardiovascular: S1S2, regular


Lungs: clear to auscultation bilaterally, clear to percussion


Abdomen: soft, no tender, no distended


Extremities: no cyanosis, no clubbing, no edema


Neurological: awake, alert, oriented


Skin: intact





Infectious Disease Assmt/Plan





- Problem List


Patient Problems: 


All Active Problems





COFFEE GROUND EMESIS (Acute) 











- Assessment


Assessment: 





1.  Severe sepsis.


2.  Pneumonia.


3.  Fecal impaction.


4.  Gallbladder stone at bladder neck.


5.  Diabetes mellitus type 2.


6.  Hypertension.


7.  Dementia.


8.  Anemia.


9.  Large hiatal hernia.


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 


11. GI bleed 2/2 PUD.








- Plan


Plan: 





Conintue zosyn and zithromax


Start vancomycin IV


Bactroban nasal ointment.





Nutritional Asmnt/Malnutr-PDOC





- Dietary Evaluation


Malnutrition Findings (Please click <Entered> for more info): 








Nutritional Asmnt/Malnutrition                             Start:  18 15:

21


Text:                                                      Status: Complete    

  


Freq:                                                                          

  


Protocol:                                                                      

  


 Document     18 15:22  [facility]  (Rec: 18 15:44  [facility] MADRIGALDIET)


 Nutritional Asmnt/Malnutrition


     Patient General Information


      Nutritional Screening                      High Risk


      Diagnosis                                  sepsis, dehydration, pneumonia


      Pertinent Medical Hx/Surgical Hx           HTN, DM, dyslipidemia,


                                                 dementia, anemia, muscle


                                                 weakness


      Subjective Information                     Pt receiving care from RN at


                                                 time of visit. Per nurse notes


                                                 , pt had coffee ground emesis,


                                                 NGT was unable to be inserted


                                                 d/t large retro cardiac


                                                 hiatal hernia, and will have


                                                 EGD tomorrow. Spoke w/ LEVON Lebron by phone who verfied


                                                 the nurse notes and states pt


                                                 will continue to be NPO.


      Current Diet Order/ Nutrition Support      NPO


      Pertinent Medications                      D5-0.45ns, novolog, culturelle


                                                 , pantoprazole


      Pertinent Labs                             : glucose 392, Na 144, Cl


                                                 110, BUN 78, Alb 2.9, 


                                                 -423


                                                 : glucose 547, Na 134, Cl


                                                 97, BUN 69, Alb 3.0, -


                                                 485


     Nutritional Hx/Data


      Height                                     1.63 m


      Height (Calculated Centimeters)            162.6


      Current Weight (lbs)                       64.682 kg


      Weight (Calculated Kilograms)              64.7


      Weight (Calculated Grams)                  32384.3


      Ideal Body Weight                          120 lb


      Body Mass Index (BMI)                      24.5


      Weight Status                              Approriate


     GI Symptoms


      GI Symptoms                                Nausea


                                                 Vomitting


      Last BM                                    none noted


      Difficult in:                              None


      Food Allergies                             No


      Skin Integrity/Comment:                    intact, angelica 13


      Current %PO                                Negligible < 25%


     Estimated Nutritional Goals


      BEE in Kcals:                              Using Current wt


      Calories/Kcals/Kg                          30-35


      Kcals Calculated                           9443-2230


      Protein:                                   Using Current wt


      Protein g/k.2-1.5


      Protein Calculated                         78-97 g


      Fluid: ml                                  per MD


     Nutritional Problem


      2. Problem


       Problem                                   Altered nutrition related lab


                                                 values


       Etiology                                  dehydration, endocrine


                                                 dysfunction


       Signs/Symptoms:                           Cl 110, BUN 78, glucose 392,


                                                 -423


      1. Problem


       Problem                                   Increased nutrient needs


       Etiology                                  metabolic stress


       Signs/Symptoms:                           dx of sepsis and pneumonia


     Malnutrition Related to Morbid Obesity


      Malnutrition related to morbid obesity     No


     Intervention/Recommendation


      Comments                                   1. Monitor NPO status and


                                                 advance diet when medically


                                                 appropriate


                                                 2. Consider alternative


                                                 nutrition route if pt unable


                                                 to tolerate PO intake


                                                 3. Monitor wt, nutrition


                                                 related labs, and skin


                                                 integrity


                                                 4. F/U as high risk in 2-3


                                                 days, -


     Expected Outcomes/Goals


      Expected Outcomes/Goals                    1. Pt to start oral intake or


                                                 alternate nutrition route if


                                                 necessary


                                                 2. Wt stability, skin to


                                                 remain intact, nutrition


                                                 related labs to approach


                                                 normal limits


                                                 Reveiwed by Lisa Haas RD

## 2018-11-21 NOTE — DIAGNOSTIC IMAGING REPORT
CHEST X-RAY: AP view



INDICATION: NG tube placement



COMPARISON: Chest x-ray 11/20/2018



FINDINGS: NG tube is in place with tip in the stomach.  Congestive

changes seen small left effusion.  Cardiomegaly is noted.  Copious stool

is seen throughout the colon with distal fecal impaction and gas-filled

loops of bowel.



IMPRESSION:



NG tube within the stomach.



Copious stool with gas-filled bowel loops.  Distal fecal impaction is

noted..  Please correlate clinically for constipation and ileus.



Congestive changes and small left effusion.  Pneumonia of the left base

cannot be excluded.

## 2018-11-21 NOTE — OPERATIVE REPORT
DATE OF SURGERY:  11/21/2018



PROCEDURE PERFORMED:  EGD with biopsy, EGD with control of bleeding, and EGD

with NG tube insertion.



ENDOSCOPIST:  Harsha Webb M.D.



PREOPERATIVE DIAGNOSES:  Hiatal hernia, coffee-ground emesis, dysphagia.



POSTOPERATIVE DIAGNOSES:  Hiatal hernia, possible Ezequiel's erosions versus

ulcer within the hernia sac, status post clips.



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

facility for coffee-ground emesis as well as pneumonia.  NG tube was tried to be

inserted at bedside, but it was coiling in the hiatal hernia and this EGD is

desired for both evaluation of the coffee ground emesis and insert an NG tube.



CONSENT:  Informed consent was obtained from the patient's conservator.  The

risks and benefits of the procedure were discussed including but not limited to

infection, bleeding, perforation, need for surgery, cardiopulmonary

complications, missed pathology and death.  The patient's healthcare proxy

indicate that they understood these risks, which go for the procedure and signed

the consent form.



ANESTHESIA:  Anesthesia was provided by the anesthesiologist in the main

operating room.  Intubation was not performed during this procedure.



PROCEDURE IN DETAIL:  The patient was hooked up to the appropriate monitoring

devices including blood pressure, pulse and pulse oximetry.  An IV was in place.

 Anesthesia was then administered by an anesthesiologist.  The patient was in

the left lateral decubitus position and a mouthpiece inserted and secured. 

Next, a gastroscope introduced in the mouth and guided under direct

visualization into the esophagus, stomach and duodenum.  The scope was slowly

and carefully withdrawn, making sure to examine mucosa and careful and

systematic fashion.  Retroflexion was performed in the stomach prior to scope

straightening and withdrawal from the body.  No obvious sign of complication was

observed within the procedure.



FINDINGS:

ESOPHAGUS:  The diaphragmatic pinch was located at 43 cm from the incisors while

the endogastric fold was located at 35 cm from the incisors.  This indicates a

7-8 cm hiatal hernia.  Within the hernia sac itself, there did appear to be a

tear or possible ulcer.  This may be from previous NG tube insertion attempts. 

I did clip this with 2 clips and seemed to close the defect.  There was no

active bleeding at the end of the clipping.  Additionally, I was able to

endoscopically place an NG tube into the stomach.  This was difficult and the NG

tube had to be snared and then guided endoscopically through the hernia sac,

which was quite large end of the stomach.  At the end of the procedure, the NG

tube was within the stomach.

STOMACH:  There is no ulcer or mass lesion within the stomach.  The GE junction

showed Hill grade 4 anatomy on retroflexed view.  A biopsy was taken from the

antrum for MERCEDEZ testing.

DUODENUM:  The duodenal bulb and second portion appeared endoscopically normal.



IMPRESSION:

1.  A 7-8 cm hiatal hernia, which had a mucosal tear that was clipped.

2.  Insertion of an NG tube endoscopically into the stomach.



RECOMMENDATIONS:

1.  We can start the feedings, although I would prefer to get a KUB to confirm

the NG tube is in the stomach after the procedure and the patient returned to

her room.

2.  Continue the Protonix drip for one more day and then can be adjusted to

twice daily dosing.

3.  If the patient does not regain ability to swallow in the future, we may

consider a G-tube insertion as I do not think this could be possible given the

anatomy of what I saw today on this EGD.

4.  Defer correction of the patient's hernia to Dr. Abbott if the patient is a

surgical candidate.



Thank you for allowing me to participate in her care.  Please call with any

further questions.





DD: 11/21/2018 09:08

DT: 11/21/2018 12:34

JOB# [id number]  [id number]

## 2018-11-21 NOTE — INTERNAL MEDICINE PROG NOTE
Internal Medicine Subjective





- Subjective


Patient seen and examined:: chart reviewed


Patient is:: other (weak)


Per staff patient has:: no adverse event, tolerating meds





Internal Medicine Objective





- Results


Result Diagrams: 


 18 04:45





 18 04:45


Recent Labs: 


 Laboratory Last Values











WBC  14.9 Th/cmm (4.8-10.8)  H  18  04:45    


 


RBC  2.40 Mil/cmm (3.80-5.20)  L  18  04:45    


 


Hgb  7.1 gm/dL (12-16)  L*  18  04:45    


 


Hct  21.1 % (41.0-60)  L  18  04:45    


 


MCV  87.7 fl ()   18  04:45    


 


MCH  29.3 pg (27.0-31.0)   18  04:45    


 


MCHC Differential  33.5 pg (28.0-36.0)   18  04:45    


 


RDW  12.7 % (11.5-20.0)   18  04:45    


 


Plt Count  257 Th/cmm (150-400)   18  04:45    


 


MPV  8.7 fl  18  04:45    


 


Add Manual Diff  YES   18  04:45    


 


Neutrophils %  NP   18  19:40    


 


Band Neutrophils %  5 % (0-10)   18  04:45    


 


Lymphocytes %  NP   18  19:40    


 


Monocytes %  NP   18  19:40    


 


Eosinophils %  NP   18  19:40    


 


Basophils %  NP   18  19:40    


 


Neutrophils (Manual)  58 % (40-80)   18  04:45    


 


Lymphocytes  27 % (20-50)   18  04:45    


 


Monocytes  6 % (2-10)   18  04:45    


 


Eosinophils  3 % (0-5)   18  04:45    


 


Basophils  1 % (0-3)   18  04:45    


 


Platelet Estimate  ADEQUATE  (NORMAL)   18  04:45    


 


PT  10.6 SECONDS (9.5-11.5)   18  04:45    


 


INR  1.02  (0.5-1.4)   18  04:45    


 


PTT (Actin FS)  22.1 SECONDS (26.0-38.0)  L  18  19:40    


 


Sodium  147 mEq/L (136-145)  H  18  04:45    


 


Potassium  3.9 mEq/L (3.5-5.1)   18  04:45    


 


Chloride  115 mEq/L ()  H  18  04:45    


 


Carbon Dioxide  26.1 mEq/L (21.0-31.0)   18  04:45    


 


Anion Gap  9.8  (7.0-16.0)   18  04:45    


 


BUN  45 mg/dL (7-25)  H  18  04:45    


 


Creatinine  1.1 mg/dL (0.6-1.2)   18  04:45    


 


Est GFR ( Amer)  TNP   18  04:45    


 


Est GFR (Non-Af Amer)  TNP   18  04:45    


 


BUN/Creatinine Ratio  40.9   18  04:45    


 


Glucose  287 mg/dL ()  H  18  04:45    


 


POC Glucose  270 MG/DL (70 - 105)  H  18  06:09    


 


Whole Bld Lactic Acid  2.46 mmol/L (0.60-1.99)  H*  18  10:07    


 


Calcium  8.4 mg/dL (8.6-10.3)  L  18  04:45    


 


Phosphorus  2.3 mg/dL (2.5-5.0)  L  18  04:45    


 


Magnesium  2.2 mg/dL (1.9-2.7)   18  04:45    


 


Total Bilirubin  0.2 mg/dL (0.3-1.0)  L  18  04:45    


 


AST  28 U/L (13-39)   18  04:45    


 


ALT  8 U/L (7-52)   18  04:45    


 


Alkaline Phosphatase  55 U/L ()   18  04:45    


 


Total Protein  5.0 gm/dL (6.0-8.3)  L  18  04:45    


 


Albumin  2.6 gm/dL (3.7-5.3)  L  18  04:45    


 


Globulin  2.4 gm/dL  18  04:45    


 


Albumin/Globulin Ratio  1.1  (1.0-1.8)   18  04:45    


 


Triglycerides  126 mg/dL (<150)   18  04:10    


 


Cholesterol  124 mg/dL (<200)   18  04:10    


 


LDL Cholesterol Direct  84 mg/dL ()   18  04:10    


 


HDL Cholesterol  30 mg/dL (23-92)   18  04:10    


 


TSH  0.75 uIU/ml (0.34-5.60)   18  04:10    


 


Blood Type  O POSITIVE   18  19:40    


 


Antibody Screen  NEGATIVE   18  19:40    














- Physical Exam


Vitals and I&O: 


 Vital Signs











Temp  97.6 F   18 08:00


 


Pulse  111   18 11:00


 


Resp  16   18 11:00


 


BP  148/55   18 11:00


 


Pulse Ox  100   18 11:00








 Intake & Output











 18





 18:59 06:59 18:59


 


Intake Total 1022.5 1968.333 420.333


 


Output Total 600 850 


 


Balance 422.5 1118.333 420.333


 


Weight (lbs) 65.005 kg 65.181 kg 


 


Intake:   


 


  Intake, IV Amount 1022.5 1968.333 420.333


 


    Azithromycin 500 mg In  250 





    Sodium Chloride 0.9% 250   





    ml @ 250 mls/hr IV Q24HR   





    PATRICE Rx#:[id number]   


 


    D5-0.45NS 1,000 ml @ 100  1468.333 400





    mls/hr IV .Q10H [facility] Rx#:   





    [id number]   


 


    Pantoprazole 80 mg In 100 200 20.333





    Sodium Chloride 0.9% 100   





    ml @ 10 mls/hr IV Q10H   





    PATRICE Rx#:[id number]   


 


    Sodium Chloride 0.9% 1, 922.5  





    000 ml @ 150 mls/hr IV .   





    Q6H40M PATRICE Rx#:[id number]   


 


    cefTRIAXone 1 gm In  50 





    Sodium Chloride 0.9% 50   





    ml @ 100 mls/hr IV Q24HR   





    PATRICE Rx#:[id number]   


 


Output:   


 


  Urine 600 850 


 


Other:   


 


  # Bowel Movements 1  


 


  Stool Characteristics Brown  


 


  Weight Source Bedscale Bedscale 











Active Medications: 


Current Medications





Acetaminophen (Tylenol)  650 mg PO Q4HR PRN


   PRN Reason: MILD PAIN 1-3


   Stop: 19 17:58


Acetaminophen (Tylenol)  650 mg PO Q4HR PRN


   PRN Reason: TEMP >100.4


   Stop: 19 17:58


Amlodipine Besylate (Norvasc)  5 mg PO DAILY [facility]


   Stop: 19 08:59


Ascorbic Acid (Vitamin C)  500 mg PO DAILY [facility]


   Stop: 19 08:59


Bisacodyl (Dulcolax 10 Mg Supp)  10 mg RC DAILY PRN


   PRN Reason: IF MOM INEFFECTIVE


   Stop: 19 17:58


Pantoprazole Sodium 80 mg/ (Sodium Chloride)  100 mls @ 10 mls/hr IV Q10H [facility]


   Stop: 19 19:44


   Last Infusion: 18 10:00 Dose:  10 mls/hr


Azithromycin 500 mg/ Sodium (Chloride)  250 mls @ 250 mls/hr IV Q24HR [facility]


   Stop: 19 22:29


   Last Infusion: 18 22:51 Dose:  Infused


Ceftriaxone Sodium 1 gm/ (Sodium Chloride)  50 mls @ 100 mls/hr IV Q24HR [facility]


   Stop: 19 20:59


   Last Infusion: 18 21:03 Dose:  Infused


Dextrose/Sodium Chloride (D5-0.45ns)  1,000 mls @ 100 mls/hr IV .Q10H [facility]


   Stop: 19 13:44


   Last Admin: 18 11:39 Dose:  100 mls/hr


Insulin Aspart (Novolog Insulin Sliding Scale)  0 units SUBQ Q6HR [facility]; Protocol


   Stop: 19 01:14


   Last Admin: 18 06:10 Dose:  Not Given


Lactobacillus Rhamnosus (Culturelle 15b)  1 each PO DAILY [facility]


   Stop: 19 08:59


Miscellaneous (Probiotic Screen)  1 ea MC PRN PRN


   PRN Reason: PROTOCOL


   Stop: 19 15:29


Morphine Sulfate (Morphine)  1 mg IVP Q4HR PRN


   PRN Reason: Pain (Moderate)


   Stop: 19 10:04


   Last Admin: 18 12:24 Dose:  1 mg








General: weak


HEENT: NC/AT


Neck: Supple


Lungs: CTAB


Cardiovascular: Normal S1, Normal S2


Abdomen: non-tender


Extremities: clear, edema


Neurological: no change





Internal Medicine Assmt/Plan





- Assessment


Assessment: 





 Current Active Problems











Problem Status Onset


 


COFFEE GROUND EMESIS Acute 








 r/o gi bleed 


Large hiatal hernia


lactic acidosis possible sepsis


htn


 DM


Dyslipidemia


Dementia 





- Plan


Plan: 





id consult


surgical consult


ivabx as ordered


gi consult 


cpm 





Nutritional Asmnt/Malnutr-PDOC





- Dietary Evaluation


Malnutrition Findings (Please click <Entered> for more info): 








Nutritional Asmnt/Malnutrition                             Start:  18 15:

21


Text:                                                      Status: Complete    

  


Freq:                                                                          

  


Protocol:                                                                      

  


 Document     18 15:22  NICOLEFORREST  (Rec: 18 15:44  [place]RI[place]  [email or web address])


 Nutritional Asmnt/Malnutrition


     Patient General Information


      Nutritional Screening                      High Risk


      Diagnosis                                  sepsis, dehydration, pneumonia


      Pertinent Medical Hx/Surgical Hx           HTN, DM, dyslipidemia,


                                                 dementia, anemia, muscle


                                                 weakness


      Subjective Information                     Pt receiving care from RN at


                                                 time of visit. Per nurse notes


                                                 , pt had coffee ground emesis,


                                                 NGT was unable to be inserted


                                                 d/t large retro cardiac


                                                 hiatal hernia, and will have


                                                 EGD tomorrow. Spoke w/ LEVON Lebron by phone who verfied


                                                 the nurse notes and states pt


                                                 will continue to be NPO.


      Current Diet Order/ Nutrition Support      NPO


      Pertinent Medications                      D5-0.45ns, novolog, culturelle


                                                 , pantoprazole


      Pertinent Labs                             : glucose 392, Na 144, Cl


                                                 110, BUN 78, Alb 2.9, 


                                                 -423


                                                 : glucose 547, Na 134, Cl


                                                 97, BUN 69, Alb 3.0, -


                                                 485


     Nutritional Hx/Data


      Height                                     1.63 m


      Height (Calculated Centimeters)            162.6


      Current Weight (lbs)                       64.682 kg


      Weight (Calculated Kilograms)              64.7


      Weight (Calculated Grams)                  99551.3


      Ideal Body Weight                          120 lb


      Body Mass Index (BMI)                      24.5


      Weight Status                              Approriate


     GI Symptoms


      GI Symptoms                                Nausea


                                                 Vomitting


      Last BM                                    none noted


      Difficult in:                              None


      Food Allergies                             No


      Skin Integrity/Comment:                    cristal, angelica 13


      Current %PO                                Negligible < 25%


     Estimated Nutritional Goals


      BEE in Kcals:                              Using Current wt


      Calories/Kcals/Kg                          30-35


      Kcals Calculated                           5921-7490


      Protein:                                   Using Current wt


      Protein g/k.2-1.5


      Protein Calculated                         78-97 g


      Fluid: ml                                  per MD


     Nutritional Problem


      2. Problem


       Problem                                   Altered nutrition related lab


                                                 values


       Etiology                                  dehydration, endocrine


                                                 dysfunction


       Signs/Symptoms:                           Cl 110, BUN 78, glucose 392,


                                                 -423


      1. Problem


       Problem                                   Increased nutrient needs


       Etiology                                  metabolic stress


       Signs/Symptoms:                           dx of sepsis and pneumonia


     Malnutrition Related to Morbid Obesity


      Malnutrition related to morbid obesity     No


     Intervention/Recommendation


      Comments                                   1. Monitor NPO status and


                                                 advance diet when medically


                                                 appropriate


                                                 2. Consider alternative


                                                 nutrition route if pt unable


                                                 to tolerate PO intake


                                                 3. Monitor wt, nutrition


                                                 related labs, and skin


                                                 integrity


                                                 4. F/U as high risk in 2-3


                                                 days, -


     Expected Outcomes/Goals


      Expected Outcomes/Goals                    1. Pt to start oral intake or


                                                 alternate nutrition route if


                                                 necessary


                                                 2. Wt stability, skin to


                                                 remain intact, nutrition


                                                 related labs to approach


                                                 normal limits


                                                 Reveiwed by Lisa Haas RD

## 2018-11-21 NOTE — INFECTIOUS DISEASE PROG NOTE
Infectious Disease Subjective





- Review of Systems


Service Date: 18


Events since last encounter: 





EGD performed today.


Subjective: 





NO new change, no fever.





Infectious Disease Objective





- Results


Result Diagrams: 


 18 04:45





 18 04:45


Recent Labs: 


 Laboratory Last Values











WBC  14.9 Th/cmm (4.8-10.8)  H  18  04:45    


 


RBC  2.40 Mil/cmm (3.80-5.20)  L  18  04:45    


 


Hgb  7.1 gm/dL (12-16)  L*  18  04:45    


 


Hct  21.1 % (41.0-60)  L  18  04:45    


 


MCV  87.7 fl ()   18  04:45    


 


MCH  29.3 pg (27.0-31.0)   18  04:45    


 


MCHC Differential  33.5 pg (28.0-36.0)   18  04:45    


 


RDW  12.7 % (11.5-20.0)   18  04:45    


 


Plt Count  257 Th/cmm (150-400)   18  04:45    


 


MPV  8.7 fl  18  04:45    


 


Add Manual Diff  YES   18  04:45    


 


Neutrophils %  NP   18  19:40    


 


Band Neutrophils %  5 % (0-10)   18  04:45    


 


Lymphocytes %  NP   18  19:40    


 


Monocytes %  NP   18  19:40    


 


Eosinophils %  NP   18  19:40    


 


Basophils %  NP   18  19:40    


 


Neutrophils (Manual)  58 % (40-80)   18  04:45    


 


Lymphocytes  27 % (20-50)   18  04:45    


 


Monocytes  6 % (2-10)   18  04:45    


 


Eosinophils  3 % (0-5)   18  04:45    


 


Basophils  1 % (0-3)   18  04:45    


 


Platelet Estimate  ADEQUATE  (NORMAL)   18  04:45    


 


PT  10.6 SECONDS (9.5-11.5)   18  04:45    


 


INR  1.02  (0.5-1.4)   18  04:45    


 


PTT (Actin FS)  22.1 SECONDS (26.0-38.0)  L  18  19:40    


 


Sodium  147 mEq/L (136-145)  H  18  04:45    


 


Potassium  3.9 mEq/L (3.5-5.1)   18  04:45    


 


Chloride  115 mEq/L ()  H  18  04:45    


 


Carbon Dioxide  26.1 mEq/L (21.0-31.0)   18  04:45    


 


Anion Gap  9.8  (7.0-16.0)   18  04:45    


 


BUN  45 mg/dL (7-25)  H  18  04:45    


 


Creatinine  1.1 mg/dL (0.6-1.2)   18  04:45    


 


Est GFR ( Amer)  TNP   18  04:45    


 


Est GFR (Non-Af Amer)  TNP   18  04:45    


 


BUN/Creatinine Ratio  40.9   18  04:45    


 


Glucose  287 mg/dL ()  H  18  04:45    


 


POC Glucose  314 MG/DL (70 - 105)  H  18  17:00    


 


Whole Bld Lactic Acid  2.46 mmol/L (0.60-1.99)  H*  18  10:07    


 


Calcium  8.4 mg/dL (8.6-10.3)  L  18  04:45    


 


Phosphorus  2.3 mg/dL (2.5-5.0)  L  18  04:45    


 


Magnesium  2.2 mg/dL (1.9-2.7)   18  04:45    


 


Total Bilirubin  0.2 mg/dL (0.3-1.0)  L  18  04:45    


 


AST  28 U/L (13-39)   18  04:45    


 


ALT  8 U/L (7-52)   18  04:45    


 


Alkaline Phosphatase  55 U/L ()   18  04:45    


 


Total Protein  5.0 gm/dL (6.0-8.3)  L  18  04:45    


 


Albumin  2.6 gm/dL (3.7-5.3)  L  18  04:45    


 


Globulin  2.4 gm/dL  18  04:45    


 


Albumin/Globulin Ratio  1.1  (1.0-1.8)   18  04:45    


 


Triglycerides  126 mg/dL (<150)   18  04:10    


 


Cholesterol  124 mg/dL (<200)   18  04:10    


 


LDL Cholesterol Direct  84 mg/dL ()   18  04:10    


 


HDL Cholesterol  30 mg/dL (23-92)   18  04:10    


 


TSH  0.75 uIU/ml (0.34-5.60)   18  04:10    


 


Blood Type  O POSITIVE   18  19:40    


 


Antibody Screen  NEGATIVE   18  19:40    














- Physical Exam


Vitals and I&O: 


 Vital Signs











Temp  98.3 F   18 20:00


 


Pulse  98   18 22:38


 


Resp  13   18 22:38


 


BP  151/59   18 22:00


 


Pulse Ox  97   18 22:38








 Intake & Output











 18





 06:59 18:59 06:59


 


Intake Total 1968.333 500.000 50


 


Output Total 850 1000 


 


Balance 1118.333 -500.000 50


 


Weight (lbs) 65.181 kg 64.864 kg 


 


Intake:   


 


  Intake, IV Amount 1968.333 500.000 50


 


    Azithromycin 500 mg In 250  





    Sodium Chloride 0.9% 250   





    ml @ 250 mls/hr IV Q24HR   





    PATRICE Rx#:[id number]   


 


    D5-0.45NS 1,000 ml @ 100 1468.333 400 





    mls/hr IV .Q10H PATRICE Rx#:   





    [id number]   


 


    Pantoprazole 80 mg In 200 100.000 





    Sodium Chloride 0.9% 100   





    ml @ 10 mls/hr IV Q10H   





    PATRICE Rx#:[id number]   


 


    cefTRIAXone 1 gm In 50  50





    Sodium Chloride 0.9% 50   





    ml @ 100 mls/hr IV Q24HR   





    PATRICE Rx#:[id number]   


 


Output:   


 


  Urine 850 1000 


 


Other:   


 


  # Bowel Movements  2 


 


  Weight Source Bedscale Bedscale 











Active Medications: 


Current Medications





Acetaminophen (Tylenol)  650 mg PO Q4HR PRN


   PRN Reason: MILD PAIN 1-3


   Stop: 19 17:58


Acetaminophen (Tylenol)  650 mg PO Q4HR PRN


   PRN Reason: TEMP >100.4


   Stop: 19 17:58


Albuterol/Ipratropium (Duoneb Neb)  3 ml HHN Q4HRT [facility]


   Stop: 19 14:59


   Last Admin: 18 22:38 Dose:  3 ml


Amlodipine Besylate (Norvasc)  5 mg PO DAILY [facility]


   Stop: 19 08:59


   Last Admin: 18 09:00 Dose:  Not Given


Ascorbic Acid (Vitamin C)  500 mg PO DAILY [facility]


   Stop: 19 08:59


   Last Admin: 18 09:00 Dose:  Not Given


Bisacodyl (Dulcolax 10 Mg Supp)  10 mg RC DAILY PRN


   PRN Reason: IF MOM INEFFECTIVE


   Stop: 19 17:58


Pantoprazole Sodium 80 mg/ (Sodium Chloride)  100 mls @ 10 mls/hr IV Q10H [facility]


   Stop: 19 19:44


   Last Admin: 18 18:07 Dose:  10 mls/hr


Azithromycin 500 mg/ Sodium (Chloride)  250 mls @ 250 mls/hr IV Q24HR [facility]


   Stop: 19 22:29


   Last Admin: 18 22:59 Dose:  250 mls/hr


Ceftriaxone Sodium 1 gm/ (Sodium Chloride)  50 mls @ 100 mls/hr IV Q24HR [facility]


   Stop: 19 20:59


   Last Infusion: 18 22:05 Dose:  Infused


Dextrose/Sodium Chloride (D5-0.45ns)  1,000 mls @ 50 mls/hr IV .Q20H [facility]


   Stop: 19 17:29


   Last Admin: 18 17:34 Dose:  50 mls/hr


Insulin Aspart (Novolog Insulin Sliding Scale)  0 units SUBQ Q6HR [facility]; Protocol


   Stop: 19 01:14


   Last Admin: 18 17:35 Dose:  9 units


Lactobacillus Rhamnosus (Culturelle 15b)  1 each PO DAILY [facility]


   Stop: 19 08:59


   Last Admin: 18 09:00 Dose:  Not Given


Miscellaneous (Probiotic Screen)  1 ea MC PRN PRN


   PRN Reason: PROTOCOL


   Stop: 19 15:29


Miscellaneous (Vancomycin Iv Per Pharmacy)  1 ea MC PRN [facility]


   Stop: 19 23:44


Morphine Sulfate (Morphine)  1 mg IVP Q4HR PRN


   PRN Reason: Pain (Moderate)


   Stop: 19 10:04


   Last Admin: 18 12:24 Dose:  1 mg


Mupirocin (Bactroban Oint)  1 appl NS BID [facility]


   Stop: 18 17:01








General: no acute distress, well developed, well nourished


HEENT: atraumatic, normocephalic, PERRLA, EOMI


Neck: supple, no thyromegaly


Cardiovascular: S1S2, regular


Lungs: clear to percussion, rhonchi


Abdomen: soft, no tender, no distended, no mass


Extremities: no cyanosis, no clubbing, no edema


Neurological: awake, other (unable to communicate.)


Skin: intact





Infectious Disease Assmt/Plan





- Problem List


Patient Problems: 


All Active Problems





COFFEE GROUND EMESIS (Acute) 











- Assessment


Assessment: 





1.  Severe sepsis.


2.  Pneumonia.


3.  Fecal impaction.


4.  Gallbladder stone at bladder neck.


5.  Diabetes mellitus type 2.


6.  Hypertension.


7.  Dementia.


8.  Anemia.


9.  Large hiatal hernia.


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 


11. GI bleed 2/2 PUD.








- Plan


Plan: 





Continue zosyn and zithromax


Start vancomycin IV


Bactroban nasal ointment.





Nutritional Asmnt/Malnutr-PDOC





- Dietary Evaluation


Malnutrition Findings (Please click <Entered> for more info): 








Nutritional Asmnt/Malnutrition                             Start:  18 15:

21


Text:                                                      Status: Complete    

  


Freq:                                                                          

  


Protocol:                                                                      

  


 Document     18 15:22  [facility]  (Rec: 18 15:44  [facility]  ROHAN-DIET)


 Nutritional Asmnt/Malnutrition


     Patient General Information


      Nutritional Screening                      High Risk


      Diagnosis                                  sepsis, dehydration, pneumonia


      Pertinent Medical Hx/Surgical Hx           HTN, DM, dyslipidemia,


                                                 dementia, anemia, muscle


                                                 weakness


      Subjective Information                     Pt receiving care from RN at


                                                 time of visit. Per nurse notes


                                                 , pt had coffee ground emesis,


                                                 NGT was unable to be inserted


                                                 d/t large retro cardiac


                                                 hiatal hernia, and will have


                                                 EGD tomorrow. Spoke w/ LEVON Lebron by phone who verfied


                                                 the nurse notes and states pt


                                                 will continue to be NPO.


      Current Diet Order/ Nutrition Support      NPO


      Pertinent Medications                      D5-0.45ns, novolog, culturelle


                                                 , pantoprazole


      Pertinent Labs                             : glucose 392, Na 144, Cl


                                                 110, BUN 78, Alb 2.9, 


                                                 -423


                                                 : glucose 547, Na 134, Cl


                                                 97, BUN 69, Alb 3.0, -


                                                 485


     Nutritional Hx/Data


      Height                                     1.63 m


      Height (Calculated Centimeters)            162.6


      Current Weight (lbs)                       64.682 kg


      Weight (Calculated Kilograms)              64.7


      Weight (Calculated Grams)                  00572.3


      Ideal Body Weight                          120 lb


      Body Mass Index (BMI)                      24.5


      Weight Status                              Approriate


     GI Symptoms


      GI Symptoms                                Nausea


                                                 Vomitting


      Last BM                                    none noted


      Difficult in:                              None


      Food Allergies                             No


      Skin Integrity/Comment:                    intact, angelica 13


      Current %PO                                Negligible < 25%


     Estimated Nutritional Goals


      BEE in Kcals:                              Using Current wt


      Calories/Kcals/Kg                          30-35


      Kcals Calculated                           4786-4322


      Protein:                                   Using Current wt


      Protein g/k.2-1.5


      Protein Calculated                         78-97 g


      Fluid: ml                                  per MD


     Nutritional Problem


      2. Problem


       Problem                                   Altered nutrition related lab


                                                 values


       Etiology                                  dehydration, endocrine


                                                 dysfunction


       Signs/Symptoms:                           Cl 110, BUN 78, glucose 392,


                                                 -423


      1. Problem


       Problem                                   Increased nutrient needs


       Etiology                                  metabolic stress


       Signs/Symptoms:                           dx of sepsis and pneumonia


     Malnutrition Related to Morbid Obesity


      Malnutrition related to morbid obesity     No


     Intervention/Recommendation


      Comments                                   1. Monitor NPO status and


                                                 advance diet when medically


                                                 appropriate


                                                 2. Consider alternative


                                                 nutrition route if pt unable


                                                 to tolerate PO intake


                                                 3. Monitor wt, nutrition


                                                 related labs, and skin


                                                 integrity


                                                 4. F/U as high risk in 2-3


                                                 days, -


     Expected Outcomes/Goals


      Expected Outcomes/Goals                    1. Pt to start oral intake or


                                                 alternate nutrition route if


                                                 necessary


                                                 2. Wt stability, skin to


                                                 remain intact, nutrition


                                                 related labs to approach


                                                 normal limits


                                                 Reveiwed by Lisa Haas RD

## 2018-11-21 NOTE — CONSULTATION
DATE OF CONSULTATION:  11/21/2018



INPATIENT GASTROINTESTINAL CONSULTATION



CONSULTING PHYSICIANS:  Dr. Ivey and Dr. Castillo.



REASON FOR CONSULTATION:  Coffee-ground emesis and hiatal hernia.



HISTORY OF PRESENT ILLNESS:  The patient is an 88-year-old female with past

medical history significant for hypertension, dysphasia, type 2 diabetes,

dementia, permanent resident of a nursing facility, admitted to the hospital for

coffee-ground emesis.  The patient was witnessed to have vomiting, coffee-ground

emesis at her nursing facility and thus was admitted to the hospital.  She is

not a reliable historian at the moment and is unable to give me much history. 

She did have a CT scan on admission that showed a large hiatal hernia as well as

bilateral pneumonia.  The patient has been admitted to the ICU and started on

antibiotics.  Dr. Castillo has seen the patient of surgery and thinks that she makes

a rather poor surgical candidate for hernia repair and thus is asked for an

upper endoscopy to further clarify the hernia as well as the NG tube insertion

for feeding purposes.



PAST MEDICAL HISTORY:  Hypertension, type 2 diabetes, dysphagia, hypertension,

dementia.



PAST SURGICAL HISTORY:  Unknown.



FAMILY HISTORY:  Noncontributory.



SOCIAL HISTORY:  The patient lives at a nursing facility.  There is no

documented history of alcoholism, illicit drug use.



REVIEW OF SYSTEMS:  Not possible given the patient's inability to participate in

the interview process.



CURRENT MEDICATIONS:  Include Tylenol, amlodipine, vitamin C, azithromycin,

Dulcolax, ceftriaxone, insulin, lactobacillus, morphine, Protonix.



PHYSICAL EXAMINATION:

VITAL SIGNS:  Blood pressure is 166/71, pulse 104 beats per minute, temperature

97.6, oxygenation is 100%.

GENERAL:  The patient is lying on her side in bed, alert and oriented x 0. 

There is no apparent distress.

HEAD, EARS, EYES, NOSE AND THROAT:  Normocephalic and atraumatic appearing head.

 Pupils are equal and reactive to light.  Extraocular muscles appear to be

intact.  There are dry mucous membranes.

NECK:  There is JVD.  There is no thyromegaly or lymphadenopathy.

CHEST:  Crackles are heard bilaterally.

CARDIOVASCULAR:  S1 and S2 are present, tachycardic.

ABDOMEN:  Soft and obese.  There is no tenderness to palpation.  No guarding or

rebound.  No fluid distention.

EXTREMITIES:  1+ pitting edema bilaterally.  Pulses are not present.

SKIN:  There is no obvious jaundice.



LABORATORY DATA:  White blood cell count is 14.9, hemoglobin 7.1, platelet count

is 257.  INR is 1.02.  Sodium is 147, BUN 45, creatinine 1.1.  Total bilirubin

0.2, AST 28, ALT 8.



IMAGING REPORTS:  Abdomen and pelvis CT scan was performed and shows large

retrocardiac hernia as well as bilateral pneumonia, distal fecal impaction,

diverticulosis.



IMPRESSION:  This is an 88-year-old female with dementia, hypertension,

dysphagia and hiatal hernia, admitted to the hospital with pneumonia and

coffee-ground emesis.

1.  Coffee-ground emesis.

2.  Anemia.

3.  Hiatal hernia.

4.  Dementia.

5.  Hypertension and dysphagia.



DISCUSSION:  It is likely that the patient's hiatal hernia contributed to the

coffee-ground emesis that was observed at the nursing facility.  An upper

endoscopy is indicated for further evaluation and possible therapeutic options. 

Upper endoscopy would not be able to fix this hernia however and this would only

need to be done surgically if the patient is a surgical candidate.  We will plan

for EGD for clarification on the hernia and any possible active bleeding. 

Additionally, if the patient has a distal fecal impaction, she likely should

have enemas to help clear this, which may also contribute to the patient's

nausea and vomiting.



RECOMMENDATIONS:

1.  We will plan for EGD with the anesthesia support.

2.  We will also plan to insert an NG tube at the time of the EGD as the nursing

staff has been unable to insert an NG tube at bedside given coiling in the

hernia.

3.  Protonix drip at the current time, this can be adjusted after the procedure.

4.  Feeding can be started through the NG tube if it is confirmed to be in place

after the procedure.

5.  Antibiotics as per ID.

6.  Correction of the hiatal hernia as per Dr. Castillo.

7.  Tap water enemas given distal fecal impaction.



Thank you for allowing me participate in her care.  Please call with any further

questions.





DD: 11/21/2018 09:03

DT: 11/21/2018 12:22

JOB# [id number]  [id number]

## 2018-11-22 LAB
ALBUMIN SERPL-MCNC: 2.6 GM/DL (ref 3.7–5.3)
ALBUMIN/GLOB SERPL: 1.1 {RATIO} (ref 1–1.8)
ALP SERPL-CCNC: 65 U/L (ref 34–104)
ALT SERPL-CCNC: 13 U/L (ref 7–52)
ANION GAP SERPL CALC-SCNC: 13.5 MMOL/L (ref 7–16)
ANISOCYTOSIS BLD QL SMEAR: (no result)
AST SERPL-CCNC: 40 U/L (ref 13–39)
BASOPHILS # BLD AUTO: 0 TH/CUMM (ref 0–0.2)
BASOPHILS NFR BLD AUTO: 0.3 % (ref 0–2)
BILIRUB SERPL-MCNC: 0.2 MG/DL (ref 0.3–1)
BUN SERPL-MCNC: 31 MG/DL (ref 7–25)
CALCIUM SERPL-MCNC: 8.4 MG/DL (ref 8.6–10.3)
CHLORIDE SERPL-SCNC: 114 MEQ/L (ref 98–107)
CO2 SERPL-SCNC: 22.1 MEQ/L (ref 21–31)
CREAT SERPL-MCNC: 1.1 MG/DL (ref 0.6–1.2)
EOSINOPHIL # BLD AUTO: 0 TH/CMM (ref 0.1–0.4)
EOSINOPHIL NFR BLD AUTO: 0.2 % (ref 0–5)
ERYTHROCYTE [DISTWIDTH] IN BLOOD BY AUTOMATED COUNT: 13.2 % (ref 11.5–20)
ERYTHROCYTE [DISTWIDTH] IN BLOOD BY AUTOMATED COUNT: 13.6 % (ref 11.5–20)
GLOBULIN SER-MCNC: 2.4 GM/DL
GLUCOSE SERPL-MCNC: 316 MG/DL (ref 70–105)
HCT VFR BLD CALC: 19.5 % (ref 41–60)
HCT VFR BLD CALC: 28.8 % (ref 41–60)
HGB BLD-MCNC: 6.4 GM/DL (ref 12–16)
HGB BLD-MCNC: 9.4 GM/DL (ref 12–16)
LYMPHOCYTE AB SER FC-ACNC: 1.9 TH/CMM (ref 1.5–3)
LYMPHOCYTES # BLD MANUAL: 19 % (ref 20–50)
LYMPHOCYTES NFR BLD AUTO: 11.9 % (ref 20–50)
MCH RBC QN AUTO: 28.6 PG (ref 27–31)
MCH RBC QN AUTO: 29 PG (ref 27–31)
MCHC RBC AUTO-ENTMCNC: 32.7 PG (ref 28–36)
MCHC RBC AUTO-ENTMCNC: 32.9 PG (ref 28–36)
MCV RBC AUTO: 87.5 FL (ref 81–100)
MCV RBC AUTO: 88.1 FL (ref 81–100)
MONOCYTES # BLD AUTO: 1.4 TH/CMM (ref 0.3–1)
MONOCYTES # BLD MANUAL: 6 % (ref 2–10)
MONOCYTES NFR BLD AUTO: 9.2 % (ref 2–10)
NEUTROPHILS # BLD: 12.4 TH/CMM (ref 1.8–8)
NEUTROPHILS NFR BLD AUTO: 75 % (ref 40–80)
NEUTROPHILS NFR BLD AUTO: 78.4 % (ref 40–80)
PCO2 BLDA: 41 MMHG (ref 35–45)
PCO2 BLDA: 67 MMHG (ref 35–45)
PLATELET # BLD EST: ADEQUATE 10*3/UL
PLATELET # BLD: 254 TH/CMM (ref 150–400)
PLATELET # BLD: 254 TH/CMM (ref 150–400)
PMV BLD AUTO: 8.9 FL
PMV BLD AUTO: 9 FL
PO2 BLDA: 227 MMHG (ref 80–100)
PO2 BLDA: 43 MMHG (ref 80–100)
POTASSIUM SERPL-SCNC: 3.6 MEQ/L (ref 3.5–5.1)
RBC # BLD AUTO: 2.21 MIL/CMM (ref 3.8–5.2)
RBC # BLD AUTO: 3.29 MIL/CMM (ref 3.8–5.2)
SAO2 % BLDA: 100 % (ref 92–100)
SAO2 % BLDA: 65 % (ref 92–100)
SODIUM SERPL-SCNC: 146 MEQ/L (ref 136–145)
WBC # BLD AUTO: 13.9 TH/CMM (ref 4.8–10.8)
WBC # BLD AUTO: 15.7 TH/CMM (ref 4.8–10.8)

## 2018-11-22 PROCEDURE — 30233N1 TRANSFUSION OF NONAUTOLOGOUS RED BLOOD CELLS INTO PERIPHERAL VEIN, PERCUTANEOUS APPROACH: ICD-10-PCS | Performed by: FAMILY MEDICINE

## 2018-11-22 PROCEDURE — 5A1955Z RESPIRATORY VENTILATION, GREATER THAN 96 CONSECUTIVE HOURS: ICD-10-PCS

## 2018-11-22 RX ADMIN — Medication SCH: at 09:42

## 2018-11-22 RX ADMIN — INSULIN ASPART SCH: 100 INJECTION, SOLUTION INTRAVENOUS; SUBCUTANEOUS at 00:05

## 2018-11-22 RX ADMIN — MORPHINE SULFATE PRN MG: 2 INJECTION, SOLUTION INTRAMUSCULAR; INTRAVENOUS at 06:06

## 2018-11-22 RX ADMIN — INSULIN ASPART SCH UNITS: 100 INJECTION, SOLUTION INTRAVENOUS; SUBCUTANEOUS at 06:03

## 2018-11-22 RX ADMIN — IPRATROPIUM BROMIDE AND ALBUTEROL SULFATE SCH ML: .5; 3 SOLUTION RESPIRATORY (INHALATION) at 10:57

## 2018-11-22 RX ADMIN — CHLORHEXIDINE GLUCONATE SCH ML: 1.2 RINSE ORAL at 20:16

## 2018-11-22 RX ADMIN — IPRATROPIUM BROMIDE AND ALBUTEROL SULFATE SCH ML: .5; 3 SOLUTION RESPIRATORY (INHALATION) at 15:11

## 2018-11-22 RX ADMIN — IPRATROPIUM BROMIDE AND ALBUTEROL SULFATE SCH ML: .5; 3 SOLUTION RESPIRATORY (INHALATION) at 03:36

## 2018-11-22 RX ADMIN — SODIUM CHLORIDE SCH MLS/HR: 9 INJECTION, SOLUTION INTRAVENOUS at 14:00

## 2018-11-22 RX ADMIN — DEXTROSE AND SODIUM CHLORIDE SCH MLS/HR: 5; .45 INJECTION, SOLUTION INTRAVENOUS at 13:50

## 2018-11-22 RX ADMIN — INSULIN ASPART SCH UNITS: 100 INJECTION, SOLUTION INTRAVENOUS; SUBCUTANEOUS at 18:35

## 2018-11-22 RX ADMIN — MORPHINE SULFATE PRN MG: 2 INJECTION, SOLUTION INTRAMUSCULAR; INTRAVENOUS at 11:40

## 2018-11-22 RX ADMIN — IPRATROPIUM BROMIDE AND ALBUTEROL SULFATE SCH: .5; 3 SOLUTION RESPIRATORY (INHALATION) at 07:16

## 2018-11-22 RX ADMIN — SODIUM CHLORIDE SCH MLS/HR: 9 INJECTION, SOLUTION INTRAVENOUS at 20:15

## 2018-11-22 RX ADMIN — INSULIN ASPART SCH UNITS: 100 INJECTION, SOLUTION INTRAVENOUS; SUBCUTANEOUS at 11:46

## 2018-11-22 NOTE — DIAGNOSTIC IMAGING REPORT
CHEST X-RAY: AP view



INDICATION: Shortness of breath



COMPARISON: 11/21/2018



FINDINGS: NG tube is visualized to the stomach with distal tip not seen.

 Congestive changes are seen with small effusions.  Cardiomegaly is

noted atherosclerosis.  Retrocardiac hiatal hernia is noted.



IMPRESSION:



Congestive changes and small effusions.  Pneumonia of the lung bases

cannot be excluded.



Cardiomegaly and atherosclerotic vascular disease. 



Retrocardiac hiatal hernia.

## 2018-11-22 NOTE — INFECTIOUS DISEASE PROG NOTE
Infectious Disease Subjective





- Review of Systems


Service Date: 18


Events since last encounter: 





low HB , blood transfusion provided. Tachycardia, cardizem drip started.


Protnix drip conitued.


Subjective: 





NO  fever.





Infectious Disease Objective





- Results


Result Diagrams: 


 18 06:10





 18 06:10


Recent Labs: 


 Laboratory Last Values











WBC  13.9 Th/cmm (4.8-10.8)  H  18  06:10    


 


RBC  2.21 Mil/cmm (3.80-5.20)  L  18  06:10    


 


Hgb  6.4 gm/dL (12-16)  L*  18  06:10    


 


Hct  19.5 % (41.0-60)  L*  18  06:10    


 


MCV  88.1 fl ()   18  06:10    


 


MCH  29.0 pg (27.0-31.0)   18  06:10    


 


MCHC Differential  32.9 pg (28.0-36.0)   18  06:10    


 


RDW  13.2 % (11.5-20.0)   18  06:10    


 


Plt Count  254 Th/cmm (150-400)   18  06:10    


 


MPV  9.0 fl  18  06:10    


 


Add Manual Diff  YES   18  06:10    


 


Neutrophils %  NP   18  06:10    


 


Band Neutrophils %  5 % (0-10)   18  04:45    


 


Lymphocytes %  NP   18  06:10    


 


Monocytes %  NP   18  06:10    


 


Eosinophils %  NP   18  06:10    


 


Basophils %  NP   18  06:10    


 


Neutrophils (Manual)  75 % (40-80)   18  06:10    


 


Lymphocytes  19 % (20-50)  L  18  06:10    


 


Monocytes  6 % (2-10)   18  06:10    


 


Eosinophils  3 % (0-5)   18  04:45    


 


Basophils  1 % (0-3)   18  04:45    


 


Platelet Estimate  ADEQUATE  (NORMAL)   18  06:10    


 


Anisocytosis  1+   18  06:10    


 


PT  10.6 SECONDS (9.5-11.5)   18  04:45    


 


INR  1.02  (0.5-1.4)   18  04:45    


 


PTT (Actin FS)  22.1 SECONDS (26.0-38.0)  L  18  19:40    


 


Sodium  146 mEq/L (136-145)  H  18  06:10    


 


Potassium  3.6 mEq/L (3.5-5.1)   18  06:10    


 


Chloride  114 mEq/L ()  H  18  06:10    


 


Carbon Dioxide  22.1 mEq/L (21.0-31.0)   18  06:10    


 


Anion Gap  13.5  (7.0-16.0)   18  06:10    


 


BUN  31 mg/dL (7-25)  H  18  06:10    


 


Creatinine  1.1 mg/dL (0.6-1.2)   18  06:10    


 


Est GFR ( Amer)  TNP   18  06:10    


 


Est GFR (Non-Af Amer)  TNP   18  06:10    


 


BUN/Creatinine Ratio  28.2   18  06:10    


 


Glucose  316 mg/dL ()  H  18  06:10    


 


POC Glucose  234 MG/DL (70 - 105)  H  18  11:44    


 


Whole Bld Lactic Acid  2.46 mmol/L (0.60-1.99)  H*  18  10:07    


 


Calcium  8.4 mg/dL (8.6-10.3)  L  18  06:10    


 


Phosphorus  2.3 mg/dL (2.5-5.0)  L  18  04:45    


 


Magnesium  2.2 mg/dL (1.9-2.7)   18  04:45    


 


Total Bilirubin  0.2 mg/dL (0.3-1.0)  L  18  06:10    


 


AST  40 U/L (13-39)  H  18  06:10    


 


ALT  13 U/L (7-52)   18  06:10    


 


Alkaline Phosphatase  65 U/L ()   18  06:10    


 


Total Protein  5.0 gm/dL (6.0-8.3)  L  18  06:10    


 


Albumin  2.6 gm/dL (3.7-5.3)  L  18  06:10    


 


Globulin  2.4 gm/dL  18  06:10    


 


Albumin/Globulin Ratio  1.1  (1.0-1.8)   18  06:10    


 


Triglycerides  126 mg/dL (<150)   18  04:10    


 


Cholesterol  124 mg/dL (<200)   18  04:10    


 


LDL Cholesterol Direct  84 mg/dL ()   18  04:10    


 


HDL Cholesterol  30 mg/dL (23-92)   18  04:10    


 


TSH  0.75 uIU/ml (0.34-5.60)   18  04:10    


 


Blood Type  O POSITIVE   18  19:40    


 


Antibody Screen  NEGATIVE   18  19:40    


 


Crossmatch  See Detail   18  19:40    














- Physical Exam


Vitals and I&O: 


 Vital Signs











Temp  98.2 F   18 12:00


 


Pulse  85   18 12:15


 


Resp  26   18 12:00


 


BP  113/72   18 12:15


 


Pulse Ox  99   18 12:00








 Intake & Output











 18





 18:59 06:59 18:59


 


Intake Total 500.000 644.667 69.167


 


Output Total 1000  350


 


Balance -500.000 644.667 -280.833


 


Weight (lbs) 64.864 kg  67.449 kg


 


Intake:   


 


  Intake, IV Amount 500.000 644.667 69.167


 


    Azithromycin 500 mg In  250 





    Sodium Chloride 0.9% 250   





    ml @ 250 mls/hr IV Q24HR   





    [facility] Rx#:[id number]   


 


    D5-0.45NS 1,000 ml @ 100 400  





    mls/hr IV .Q10H [facility] Rx#:   





    [id number]   


 


    Pantoprazole 80 mg In 100.000 94.667 69.167





    Sodium Chloride 0.9% 100   





    ml @ 10 mls/hr IV Q10H   





    [facility] Rx#:[id number]   


 


    cefTRIAXone 1 gm In  50 





    Sodium Chloride 0.9% 50   





    ml @ 100 mls/hr IV Q24HR   





    [facility] Rx#:[id number]   


 


Output:   


 


  Urine 1000  350


 


Other:   


 


  # Bowel Movements 2  0


 


  Weight Source Bedscale  Bedscale











Active Medications: 


Current Medications





Acetaminophen (Tylenol)  650 mg PO Q4HR PRN


   PRN Reason: MILD PAIN 1-3


   Stop: 19 17:58


Acetaminophen (Tylenol)  650 mg PO Q4HR PRN


   PRN Reason: TEMP >100.4


   Stop: 19 17:58


Acetylcysteine (Mucomyst 20%)  3 ml HHN Q4HRT [facility]


   Stop: 19 14:59


Albuterol/Ipratropium (Duoneb Neb)  3 ml HHN Q4HRT [facility]


   Stop: 19 14:59


   Last Admin: 18 10:57 Dose:  3 ml


Amlodipine Besylate (Norvasc)  5 mg PO DAILY [facility]


   Stop: 19 08:59


   Last Admin: 18 09:43 Dose:  Not Given


Ascorbic Acid (Vitamin C)  500 mg PO DAILY [facility]


   Stop: 19 08:59


   Last Admin: 18 09:43 Dose:  Not Given


Bisacodyl (Dulcolax 10 Mg Supp)  10 mg RC DAILY PRN


   PRN Reason: IF MOM INEFFECTIVE


   Stop: 19 17:58


Pantoprazole Sodium 80 mg/ (Sodium Chloride)  100 mls @ 10 mls/hr IV Q10H [facility]


   Stop: 19 19:44


   Last Admin: 18 10:30 Dose:  10 mls/hr


Azithromycin 500 mg/ Sodium (Chloride)  250 mls @ 250 mls/hr IV Q24HR [facility]


   Stop: 19 22:29


   Last Infusion: 18 00:00 Dose:  Infused


Dextrose/Sodium Chloride (D5-0.45ns)  1,000 mls @ 50 mls/hr IV .Q20H [facility]


   Stop: 19 17:29


   Last Admin: 18 17:34 Dose:  50 mls/hr


Diltiazem HCl 125 mg/ Dextrose  100 mls @ 0 mls/hr IV Q8H [facility]N; Protocol


   PRN Reason: blood pressure


   Stop: 19 07:48


   Last Admin: 18 09:00 Dose:  6.25 mg/hr, 5 mls/hr


Vancomycin HCl 1 gm/ Sodium (Chloride)  250 mls @ 165 mls/hr IV Q24H [facility]


   Stop: 19 09:59


   Last Admin: 18 09:55 Dose:  165 mls/hr


Piperacillin Sod/Tazobactam (Sod 2.25 gm/ Sodium Chloride)  50 mls @ 100 mls/hr 

IV Q6H [facility]


   Stop: 19 13:59


Insulin Aspart (Novolog Insulin Sliding Scale)  0 units SUBQ Q6HR [facility]; Protocol


   Stop: 19 01:14


   Last Admin: 18 11:46 Dose:  5 units


Lactobacillus Rhamnosus (Culturelle 15b)  1 each PO DAILY [facility]


   Stop: 19 08:59


   Last Admin: 18 09:42 Dose:  Not Given


Lorazepam (Ativan)  1 mg IVP Q4HR PRN; Protocol


   PRN Reason: Agitation


   Stop: 19 07:12


   Last Admin: 18 09:00 Dose:  1 mg


Miscellaneous (Probiotic Screen)  1 ea  PRN PRN


   PRN Reason: PROTOCOL


   Stop: 19 15:29


Miscellaneous (Vancomycin Iv Per Pharmacy)  1 ea MC PRN [facility]


   Stop: 19 23:44


Miscellaneous (Zosyn Iv Per Pharmacy)  1 ea  PRN PRN


   PRN Reason: PROTOCOL


   Stop: 19 12:03


Morphine Sulfate (Morphine)  1 mg IVP Q4HR PRN


   PRN Reason: Pain (Moderate)


   Stop: 19 10:04


   Last Admin: 18 11:40 Dose:  1 mg


Mupirocin (Bactroban Oint)  1 appl NS BID [facility]


   Stop: 18 17:01


   Last Admin: 18 09:44 Dose:  1 appl








General: no acute distress, well developed, well nourished


HEENT: atraumatic, normocephalic, PERRLA, EOMI


Neck: supple, no thyromegaly


Cardiovascular: S1S2, regular


Lungs: clear to auscultation bilaterally, clear to percussion


Abdomen: soft, no tender, no distended, no mass


Extremities: no cyanosis, no clubbing, no edema


Neurological: awake


Skin: intact





Infectious Disease Assmt/Plan





- Problem List


Patient Problems: 


All Active Problems





COFFEE GROUND EMESIS (Acute) 











- Assessment


Assessment: 





1.  Severe sepsis.


2.  Pneumonia.


3.  Fecal impaction.


4.  Gallbladder stone at bladder neck.


5.  Diabetes mellitus type 2.


6.  Hypertension.


7.  Dementia.


8.  Anemia.


9.  Large hiatal hernia.


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 


11. GI bleed 2/2 PUD.


12. SVT








- Plan


Plan: 





Continue zosyn and zithromax


Continue vancomycin IV


Bactroban nasal ointment.





Nutritional Asmnt/Malnutr-PDOC





- Dietary Evaluation


Malnutrition Findings (Please click <Entered> for more info): 








Nutritional Asmnt/Malnutrition                             Start:  18 15:

21


Text:                                                      Status: Complete    

  


Freq:                                                                          

  


Protocol:                                                                      

  


 Document     18 15:22  NICOLEFORREST  (Rec: 18 15:44  [facility] [id number])


 Nutritional Asmnt/Malnutrition


     Patient General Information


      Nutritional Screening                      High Risk


      Diagnosis                                  sepsis, dehydration, pneumonia


      Pertinent Medical Hx/Surgical Hx           HTN, DM, dyslipidemia,


                                                 dementia, anemia, muscle


                                                 weakness


      Subjective Information                     Pt receiving care from RN at


                                                 time of visit. Per nurse notes


                                                 , pt had coffee ground emesis,


                                                 NGT was unable to be inserted


                                                 d/t large retro cardiac


                                                 hiatal hernia, and will have


                                                 EGD tomorrow. Spoke w/ LEVON Lebron by phone who verfied


                                                 the nurse notes and states pt


                                                 will continue to be NPO.


      Current Diet Order/ Nutrition Support      NPO


      Pertinent Medications                      D5-0.45ns, novolog, culturelle


                                                 , pantoprazole


      Pertinent Labs                             : glucose 392, Na 144, Cl


                                                 110, BUN 78, Alb 2.9, 


                                                 -423


                                                 : glucose 547, Na 134, Cl


                                                 97, BUN 69, Alb 3.0, -


                                                 485


     Nutritional Hx/Data


      Height                                     1.63 m


      Height (Calculated Centimeters)            162.6


      Current Weight (lbs)                       64.682 kg


      Weight (Calculated Kilograms)              64.7


      Weight (Calculated Grams)                  69087.3


      Ideal Body Weight                          120 lb


      Body Mass Index (BMI)                      24.5


      Weight Status                              Approriate


     GI Symptoms


      GI Symptoms                                Nausea


                                                 Vomitting


      Last BM                                    none noted


      Difficult in:                              None


      Food Allergies                             No


      Skin Integrity/Comment:                    intact, angelica 13


      Current %PO                                Negligible < 25%


     Estimated Nutritional Goals


      BEE in Kcals:                              Using Current wt


      Calories/Kcals/Kg                          30-35


      Kcals Calculated                           4779-5656


      Protein:                                   Using Current wt


      Protein g/k.2-1.5


      Protein Calculated                         78-97 g


      Fluid: ml                                  per MD


     Nutritional Problem


      2. Problem


       Problem                                   Altered nutrition related lab


                                                 values


       Etiology                                  dehydration, endocrine


                                                 dysfunction


       Signs/Symptoms:                           Cl 110, BUN 78, glucose 392,


                                                 -423


      1. Problem


       Problem                                   Increased nutrient needs


       Etiology                                  metabolic stress


       Signs/Symptoms:                           dx of sepsis and pneumonia


     Malnutrition Related to Morbid Obesity


      Malnutrition related to morbid obesity     No


     Intervention/Recommendation


      Comments                                   1. Monitor NPO status and


                                                 advance diet when medically


                                                 appropriate


                                                 2. Consider alternative


                                                 nutrition route if pt unable


                                                 to tolerate PO intake


                                                 3. Monitor wt, nutrition


                                                 related labs, and skin


                                                 integrity


                                                 4. F/U as high risk in 2-3


                                                 days, -


     Expected Outcomes/Goals


      Expected Outcomes/Goals                    1. Pt to start oral intake or


                                                 alternate nutrition route if


                                                 necessary


                                                 2. Wt stability, skin to


                                                 remain intact, nutrition


                                                 related labs to approach


                                                 normal limits


                                                 Reveiwed by Lisa Haas RD

## 2018-11-22 NOTE — CONSULTATION
DATE OF CONSULTATION:  11/19/2018



SURGICAL CONSULT



REFERRING PHYSICIAN:  Dr. Ivey.



REASON FOR CONSULTATION:  Hematemesis.



Thank you for referring this patient to me.  The chart is reviewed as the

patient is unable to give any information and this is the only source available.

 She came in because of coffee-ground emesis.  Past history includes

hypertension, type 2  diabetes, dysphagia, and dementia.



The laboratory studies showed hemoglobin very low at 7.1 and the chemistry: 

Blood sugar was high at 287, BUN at 45, protein low at 2.8.  Liver function

tests normal.  She underwent a CT scan of the abdomen and this showed large

retrocardiac hiatal hernia with most of the gastric content above the diaphragm.



Attempt at placement of NG tube was unsuccessful and GI evaluation was done by

Dr. Webb who did EGD on her, which showed a large part of the stomach in the

chest.  He was able to place an NG tube.



Following discussion with him, he plans to do possible PEG placement and after

wait of several weeks, might introduce a jejunostomy tube.  Otherwise, the

patient is a very poor surgical candidate and unless pushed into the surgery,

would recommend outlined plan by GI consultant.





DD: 11/22/2018 10:07

DT: 11/22/2018 10:56

[facility]# [id number]  [id number]

## 2018-11-22 NOTE — GI PROGRESS NOTE
Subjective





- Review of Systems


Service Date: 11/22/18


Subjective: 


Hgb dropped this morning, although nothing actively bleeding. Remains confused





Objective





- Results


Result Diagrams: 


 11/22/18 06:10





 11/22/18 06:10


Recent Labs: 


 Laboratory Last Values











WBC  13.9 Th/cmm (4.8-10.8)  H  11/22/18  06:10    


 


RBC  2.21 Mil/cmm (3.80-5.20)  L  11/22/18  06:10    


 


Hgb  6.4 gm/dL (12-16)  L*  11/22/18  06:10    


 


Hct  19.5 % (41.0-60)  L*  11/22/18  06:10    


 


MCV  88.1 fl ()   11/22/18  06:10    


 


MCH  29.0 pg (27.0-31.0)   11/22/18  06:10    


 


MCHC Differential  32.9 pg (28.0-36.0)   11/22/18  06:10    


 


RDW  13.2 % (11.5-20.0)   11/22/18  06:10    


 


Plt Count  254 Th/cmm (150-400)   11/22/18  06:10    


 


MPV  9.0 fl  11/22/18  06:10    


 


Add Manual Diff  YES   11/21/18  04:45    


 


Neutrophils %  77.8 % (40.0-80.0)   11/22/18  06:10    


 


Band Neutrophils %  5 % (0-10)   11/21/18  04:45    


 


Lymphocytes %  14.7 % (20.0-50.0)  L  11/22/18  06:10    


 


Monocytes %  6.7 % (2.0-10.0)   11/22/18  06:10    


 


Eosinophils %  0.5 % (0.0-5.0)   11/22/18  06:10    


 


Basophils %  0.3 % (0.0-2.0)   11/22/18  06:10    


 


Neutrophils (Manual)  58 % (40-80)   11/21/18  04:45    


 


Lymphocytes  27 % (20-50)   11/21/18  04:45    


 


Monocytes  6 % (2-10)   11/21/18  04:45    


 


Eosinophils  3 % (0-5)   11/21/18  04:45    


 


Basophils  1 % (0-3)   11/21/18  04:45    


 


Platelet Estimate  ADEQUATE  (NORMAL)   11/21/18  04:45    


 


PT  10.6 SECONDS (9.5-11.5)   11/21/18  04:45    


 


INR  1.02  (0.5-1.4)   11/21/18  04:45    


 


PTT (Actin FS)  22.1 SECONDS (26.0-38.0)  L  11/19/18  19:40    


 


Sodium  146 mEq/L (136-145)  H  11/22/18  06:10    


 


Potassium  3.6 mEq/L (3.5-5.1)   11/22/18  06:10    


 


Chloride  114 mEq/L ()  H  11/22/18  06:10    


 


Carbon Dioxide  22.1 mEq/L (21.0-31.0)   11/22/18  06:10    


 


Anion Gap  13.5  (7.0-16.0)   11/22/18  06:10    


 


BUN  31 mg/dL (7-25)  H  11/22/18  06:10    


 


Creatinine  1.1 mg/dL (0.6-1.2)   11/22/18  06:10    


 


Est GFR ( Amer)  TNP   11/22/18  06:10    


 


Est GFR (Non-Af Amer)  TNP   11/22/18  06:10    


 


BUN/Creatinine Ratio  28.2   11/22/18  06:10    


 


Glucose  316 mg/dL ()  H  11/22/18  06:10    


 


POC Glucose  289 MG/DL (70 - 105)  H  11/22/18  05:51    


 


Whole Bld Lactic Acid  2.46 mmol/L (0.60-1.99)  H*  11/20/18  10:07    


 


Calcium  8.4 mg/dL (8.6-10.3)  L  11/22/18  06:10    


 


Phosphorus  2.3 mg/dL (2.5-5.0)  L  11/21/18  04:45    


 


Magnesium  2.2 mg/dL (1.9-2.7)   11/21/18  04:45    


 


Total Bilirubin  0.2 mg/dL (0.3-1.0)  L  11/22/18  06:10    


 


AST  40 U/L (13-39)  H  11/22/18  06:10    


 


ALT  13 U/L (7-52)   11/22/18  06:10    


 


Alkaline Phosphatase  65 U/L ()   11/22/18  06:10    


 


Total Protein  5.0 gm/dL (6.0-8.3)  L  11/22/18  06:10    


 


Albumin  2.6 gm/dL (3.7-5.3)  L  11/22/18  06:10    


 


Globulin  2.4 gm/dL  11/22/18  06:10    


 


Albumin/Globulin Ratio  1.1  (1.0-1.8)   11/22/18  06:10    


 


Triglycerides  126 mg/dL (<150)   11/20/18  04:10    


 


Cholesterol  124 mg/dL (<200)   11/20/18  04:10    


 


LDL Cholesterol Direct  84 mg/dL ()   11/20/18  04:10    


 


HDL Cholesterol  30 mg/dL (23-92)   11/20/18  04:10    


 


TSH  0.75 uIU/ml (0.34-5.60)   11/20/18  04:10    


 


Blood Type  O POSITIVE   11/19/18  19:40    


 


Antibody Screen  NEGATIVE   11/19/18  19:40    














- Physical Exam


Vitals and I&O: 


 Vital Signs











Temp  97.7 F   11/22/18 04:00


 


Pulse  165   11/22/18 07:25


 


Resp  22   11/22/18 07:15


 


BP  146/74   11/22/18 06:00


 


Pulse Ox  96   11/22/18 07:15








 Intake & Output











 11/21/18 11/22/18 11/22/18





 18:59 06:59 18:59


 


Intake Total 500.000 644.667 


 


Output Total 1000  


 


Balance -500.000 644.667 


 


Weight (lbs) 64.864 kg  


 


Intake:   


 


  Intake, IV Amount 500.000 644.667 


 


    Azithromycin 500 mg In  250 





    Sodium Chloride 0.9% 250   





    ml @ 250 mls/hr IV Q24HR   





    [facility] Rx#:[id number]   


 


    D5-0.45NS 1,000 ml @ 100 400  





    mls/hr IV .Q10H [facility] Rx#:   





    [id number]   


 


    Pantoprazole 80 mg In 100.000 94.667 





    Sodium Chloride 0.9% 100   





    ml @ 10 mls/hr IV Q10H   





    [facility] Rx#:[id number]   


 


    cefTRIAXone 1 gm In  50 





    Sodium Chloride 0.9% 50   





    ml @ 100 mls/hr IV Q24HR   





    [facility] Rx#:[id number]   


 


Output:   


 


  Urine 1000  


 


Other:   


 


  # Bowel Movements 2  


 


  Weight Source Bedscale  











Active Medications: 


Current Medications





Acetaminophen (Tylenol)  650 mg PO Q4HR PRN


   PRN Reason: MILD PAIN 1-3


   Stop: 01/19/19 17:58


Acetaminophen (Tylenol)  650 mg PO Q4HR PRN


   PRN Reason: TEMP >100.4


   Stop: 01/19/19 17:58


Albuterol/Ipratropium (Duoneb Neb)  3 ml HHN Q4HRT [facility]


   Stop: 01/20/19 14:59


   Last Admin: 11/22/18 07:16 Dose:  Not Given


Amlodipine Besylate (Norvasc)  5 mg PO DAILY [facility]


   Stop: 01/20/19 08:59


   Last Admin: 11/21/18 09:00 Dose:  Not Given


Ascorbic Acid (Vitamin C)  500 mg PO DAILY [facility]


   Stop: 01/20/19 08:59


   Last Admin: 11/21/18 09:00 Dose:  Not Given


Bisacodyl (Dulcolax 10 Mg Supp)  10 mg RC DAILY PRN


   PRN Reason: IF MOM INEFFECTIVE


   Stop: 01/19/19 17:58


Pantoprazole Sodium 80 mg/ (Sodium Chloride)  100 mls @ 10 mls/hr IV Q10H [facility]


   Stop: 01/18/19 19:44


   Last Admin: 11/22/18 03:35 Dose:  10 mls/hr


Azithromycin 500 mg/ Sodium (Chloride)  250 mls @ 250 mls/hr IV Q24HR [facility]


   Stop: 01/18/19 22:29


   Last Infusion: 11/22/18 00:00 Dose:  Infused


Ceftriaxone Sodium 1 gm/ (Sodium Chloride)  50 mls @ 100 mls/hr IV Q24HR [facility]


   Stop: 01/19/19 20:59


   Last Infusion: 11/21/18 22:05 Dose:  Infused


Dextrose/Sodium Chloride (D5-0.45ns)  1,000 mls @ 50 mls/hr IV .Q20H [facility]


   Stop: 01/20/19 17:29


   Last Admin: 11/21/18 17:34 Dose:  50 mls/hr


Insulin Aspart (Novolog Insulin Sliding Scale)  0 units SUBQ Q6HR [facility]; Protocol


   Stop: 01/19/19 01:14


   Last Admin: 11/22/18 06:03 Dose:  7 units


Lactobacillus Rhamnosus (Culturelle 15b)  1 each PO DAILY [facility]


   Stop: 01/20/19 08:59


   Last Admin: 11/21/18 09:00 Dose:  Not Given


Lorazepam (Ativan)  1 mg IVP Q4HR PRN; Protocol


   PRN Reason: Agitation


   Stop: 01/21/19 07:12


Miscellaneous (Probiotic Screen)  1 ea  PRN PRN


   PRN Reason: PROTOCOL


   Stop: 01/19/19 15:29


Miscellaneous (Vancomycin Iv Per Pharmacy)  1 ea MC PRN [facility]


   Stop: 01/20/19 23:44


Morphine Sulfate (Morphine)  1 mg IVP Q4HR PRN


   PRN Reason: Pain (Moderate)


   Stop: 01/19/19 10:04


   Last Admin: 11/22/18 06:06 Dose:  1 mg


Mupirocin (Bactroban Oint)  1 appl NS BID [facility]


   Stop: 11/26/18 17:01








General: Alert


HEENT: Atraumatic


Neck: Supple


Cardiovascular: no Regular rate


Abdomen: Bowel sounds, Soft, no Tender, no Hepatomegaly, no Splenomegaly, no 

Distended, no Rebound





Assessment/Plan





- Problem List


Patient Problems: 


All Active Problems





COFFEE GROUND EMESIS (Acute) 











- Assessment


Assessment: 


# Coffee ground emesis


# hiatal hernia


# Pneumonia


# Severe sepsis


# Fecal impaction





EGD on 11/21 showed 7cm hiatal hernia, with evidence of mucosal tear there from 

previous NG tube insertion attempts. 2 clips placed to this area. With some 

difficulty, an NG tube was endoscopically placed into the stomach. No stomach 

ulcer or mass. 





Hgb down today, possibly reflecting bleeding she incurred during EGD on 11/21.








Plan:





- transfuse 2u pRBC now. Post transfusion CBC


- cont PPI drip


- NPO until more stable


- cont IVF


- cont Abx


- 500cc tap water enema q12 for fecal impaction


- can consider G tube insertion in the future, but she would need to be more 

stable for this from a respiratory standpoint

## 2018-11-22 NOTE — CONSULTATION
DATE OF CONSULTATION:     11/21/2018



Thank you very much Dr. Ivey for this consultation.



This is an 88-year-old female who presented with hematemesis, underwent EGD,

showed hiatal hernia and some ulceration.  According to nursing staff, the

patient is having some shortness of breath and congestion and wheezing, has

improved after suctioning and breathing treatment, oxygen supplementation, doing

a little bit better now, some congestion on and off.  No other history can be

obtained at this time.



PAST MEDICAL HISTORY:  Dysphagia, hypertension, diabetes, dyslipidemia,

dementia.



REVIEW OF SYSTEMS:  Unable to obtain because of the patient's condition.



PHYSICAL EXAMINATION:

GENERAL:  The patient is lethargic, not in acute distress, congested on and off.

VITAL SIGNS:  Temperature 97.7, pulse 111, respirations 22, blood pressure

114/32, saturation 95%.

HEENT:  Atraumatic, normocephalic.  Pupils react to light and accommodation. 

Ears, nose and throat normal.

NECK:  Supple.  No JVD.

CHEST:  There is rhonchi in bases.

HEART:  Regular in rate and rhythm.

ABDOMEN:  Soft.

EXTREMITIES:  No edema.



LABORATORY AND DIAGNOSTIC DATA:  Chest x-ray has infiltrate in the left lower

lobe area.  Some fecal impaction on the KUB.  CT chest area critical in

nature.  Other labs:  The WBC is 14.9, hemoglobin 7.1, hematocrit 21.1,

platelets is 257.  Sodium 147, potassium 3.9, BUN is 45, creatinine 1.1.



IMPRESSION:  An 88-year-old female with:

1.  Hematemesis/hiatal hernia.

2.  Aspiration pneumonia.

3.  Respiratory failure.

4.  Some bronchospasm.

5.  Dysphagia.



PLAN:

1.  I would hold off on feeding for now.

2.  Start feeding tomorrow.

3.  Aggressive IV fluids.

4.  Nebulizer treatments.

5.  Follow up chest x-ray.



I will follow the patient with you.  Thank you very much for this consultation.





DD: 11/21/2018 17:36

DT: 11/22/2018 09:42

JOB# [id number]  [id number]

MTDD

## 2018-11-23 LAB
ALBUMIN SERPL-MCNC: 2.5 GM/DL (ref 3.7–5.3)
ALBUMIN/GLOB SERPL: 1.1 {RATIO} (ref 1–1.8)
ALP SERPL-CCNC: 63 U/L (ref 34–104)
ALT SERPL-CCNC: 13 U/L (ref 7–52)
ANION GAP SERPL CALC-SCNC: 13.1 MMOL/L (ref 7–16)
AST SERPL-CCNC: 35 U/L (ref 13–39)
BASOPHILS # BLD AUTO: 0 TH/CUMM (ref 0–0.2)
BASOPHILS NFR BLD AUTO: 0.1 % (ref 0–2)
BILIRUB SERPL-MCNC: 0.5 MG/DL (ref 0.3–1)
BUN SERPL-MCNC: 34 MG/DL (ref 7–25)
CALCIUM SERPL-MCNC: 8.3 MG/DL (ref 8.6–10.3)
CHLORIDE SERPL-SCNC: 116 MEQ/L (ref 98–107)
CO2 SERPL-SCNC: 21.3 MEQ/L (ref 21–31)
CREAT SERPL-MCNC: 1.5 MG/DL (ref 0.6–1.2)
EOSINOPHIL # BLD AUTO: 0.1 TH/CMM (ref 0.1–0.4)
EOSINOPHIL NFR BLD AUTO: 0.5 % (ref 0–5)
ERYTHROCYTE [DISTWIDTH] IN BLOOD BY AUTOMATED COUNT: 13.5 % (ref 11.5–20)
GLOBULIN SER-MCNC: 2.2 GM/DL
GLUCOSE SERPL-MCNC: 293 MG/DL (ref 70–105)
HCT VFR BLD CALC: 27.1 % (ref 41–60)
HGB BLD-MCNC: 9.2 GM/DL (ref 12–16)
LYMPHOCYTE AB SER FC-ACNC: 2.4 TH/CMM (ref 1.5–3)
LYMPHOCYTES NFR BLD AUTO: 12.9 % (ref 20–50)
MCH RBC QN AUTO: 29.3 PG (ref 27–31)
MCHC RBC AUTO-ENTMCNC: 33.8 PG (ref 28–36)
MCV RBC AUTO: 86.8 FL (ref 81–100)
MONOCYTES # BLD AUTO: 1.2 TH/CMM (ref 0.3–1)
MONOCYTES NFR BLD AUTO: 6.6 % (ref 2–10)
NEUTROPHILS # BLD: 15 TH/CMM (ref 1.8–8)
NEUTROPHILS NFR BLD AUTO: 79.9 % (ref 40–80)
PHOSPHATE SERPL-MCNC: 2.1 MG/DL (ref 2.5–5)
PLATELET # BLD: 222 TH/CMM (ref 150–400)
PMV BLD AUTO: 8.9 FL
POTASSIUM SERPL-SCNC: 3.4 MEQ/L (ref 3.5–5.1)
RBC # BLD AUTO: 3.12 MIL/CMM (ref 3.8–5.2)
SODIUM SERPL-SCNC: 147 MEQ/L (ref 136–145)
WBC # BLD AUTO: 18.7 TH/CMM (ref 4.8–10.8)

## 2018-11-23 RX ADMIN — INSULIN ASPART SCH UNITS: 100 INJECTION, SOLUTION INTRAVENOUS; SUBCUTANEOUS at 07:36

## 2018-11-23 RX ADMIN — SODIUM CHLORIDE SCH MLS/HR: 9 INJECTION, SOLUTION INTRAVENOUS at 02:13

## 2018-11-23 RX ADMIN — SODIUM CHLORIDE SCH MLS/HR: 9 INJECTION, SOLUTION INTRAVENOUS at 20:40

## 2018-11-23 RX ADMIN — DEXTROSE AND SODIUM CHLORIDE SCH MLS/HR: 5; .45 INJECTION, SOLUTION INTRAVENOUS at 01:02

## 2018-11-23 RX ADMIN — INSULIN ASPART SCH UNITS: 100 INJECTION, SOLUTION INTRAVENOUS; SUBCUTANEOUS at 00:58

## 2018-11-23 RX ADMIN — IPRATROPIUM BROMIDE AND ALBUTEROL SULFATE SCH ML: .5; 3 SOLUTION RESPIRATORY (INHALATION) at 19:18

## 2018-11-23 RX ADMIN — POLYETHYLENE GLYCOL 3350 SCH GM: 17 POWDER, FOR SOLUTION ORAL at 16:20

## 2018-11-23 RX ADMIN — CHLORHEXIDINE GLUCONATE SCH ML: 1.2 RINSE ORAL at 20:39

## 2018-11-23 RX ADMIN — SODIUM CHLORIDE SCH MLS/HR: 9 INJECTION, SOLUTION INTRAVENOUS at 08:03

## 2018-11-23 RX ADMIN — INSULIN ASPART SCH UNITS: 100 INJECTION, SOLUTION INTRAVENOUS; SUBCUTANEOUS at 17:44

## 2018-11-23 RX ADMIN — IPRATROPIUM BROMIDE AND ALBUTEROL SULFATE SCH ML: .5; 3 SOLUTION RESPIRATORY (INHALATION) at 00:30

## 2018-11-23 RX ADMIN — SODIUM CHLORIDE SCH MLS/HR: 9 INJECTION, SOLUTION INTRAVENOUS at 13:35

## 2018-11-23 RX ADMIN — IPRATROPIUM BROMIDE AND ALBUTEROL SULFATE SCH ML: .5; 3 SOLUTION RESPIRATORY (INHALATION) at 10:50

## 2018-11-23 RX ADMIN — INSULIN ASPART SCH UNITS: 100 INJECTION, SOLUTION INTRAVENOUS; SUBCUTANEOUS at 11:20

## 2018-11-23 RX ADMIN — CHLORHEXIDINE GLUCONATE SCH ML: 1.2 RINSE ORAL at 08:05

## 2018-11-23 RX ADMIN — IPRATROPIUM BROMIDE AND ALBUTEROL SULFATE SCH ML: .5; 3 SOLUTION RESPIRATORY (INHALATION) at 06:42

## 2018-11-23 RX ADMIN — Medication SCH EACH: at 08:11

## 2018-11-23 RX ADMIN — MORPHINE SULFATE PRN MG: 2 INJECTION, SOLUTION INTRAMUSCULAR; INTRAVENOUS at 10:58

## 2018-11-23 RX ADMIN — DEXTROSE AND SODIUM CHLORIDE SCH MLS/HR: 5; .45 INJECTION, SOLUTION INTRAVENOUS at 10:25

## 2018-11-23 RX ADMIN — IPRATROPIUM BROMIDE AND ALBUTEROL SULFATE SCH ML: .5; 3 SOLUTION RESPIRATORY (INHALATION) at 03:13

## 2018-11-23 RX ADMIN — POLYETHYLENE GLYCOL 3350 SCH GM: 17 POWDER, FOR SOLUTION ORAL at 09:50

## 2018-11-23 RX ADMIN — IPRATROPIUM BROMIDE AND ALBUTEROL SULFATE SCH ML: .5; 3 SOLUTION RESPIRATORY (INHALATION) at 14:58

## 2018-11-23 RX ADMIN — IPRATROPIUM BROMIDE AND ALBUTEROL SULFATE SCH ML: .5; 3 SOLUTION RESPIRATORY (INHALATION) at 23:20

## 2018-11-23 NOTE — INTERNAL MEDICINE PROG NOTE
Internal Medicine Subjective





- Subjective


Patient seen and examined:: chart reviewed


Patient is:: other (weak Intubated for continued respiratory distress)


Per staff patient has:: no adverse event, tolerating meds





Internal Medicine Objective





- Results


Result Diagrams: 


 18 04:05





 18 04:05


Recent Labs: 


 Laboratory Last Values











WBC  18.7 Th/cmm (4.8-10.8)  H  18  04:05    


 


RBC  3.12 Mil/cmm (3.80-5.20)  L  18  04:05    


 


Hgb  9.2 gm/dL (12-16)  L  18  04:05    


 


Hct  27.1 % (41.0-60)  L  18  04:05    


 


MCV  86.8 fl ()   18  04:05    


 


MCH  29.3 pg (27.0-31.0)   18  04:05    


 


MCHC Differential  33.8 pg (28.0-36.0)   18  04:05    


 


RDW  13.5 % (11.5-20.0)   18  04:05    


 


Plt Count  222 Th/cmm (150-400)   18  04:05    


 


MPV  8.9 fl  18  04:05    


 


Add Manual Diff  YES   18  06:10    


 


Neutrophils %  79.9 % (40.0-80.0)   18  04:05    


 


Band Neutrophils %  5 % (0-10)   18  04:45    


 


Lymphocytes %  12.9 % (20.0-50.0)  L  18  04:05    


 


Monocytes %  6.6 % (2.0-10.0)   18  04:05    


 


Eosinophils %  0.5 % (0.0-5.0)   18  04:05    


 


Basophils %  0.1 % (0.0-2.0)   18  04:05    


 


Neutrophils (Manual)  75 % (40-80)   18  06:10    


 


Lymphocytes  19 % (20-50)  L  18  06:10    


 


Monocytes  6 % (2-10)   18  06:10    


 


Eosinophils  3 % (0-5)   18  04:45    


 


Basophils  1 % (0-3)   18  04:45    


 


Platelet Estimate  ADEQUATE  (NORMAL)   18  06:10    


 


Anisocytosis  1+   18  06:10    


 


PT  10.6 SECONDS (9.5-11.5)   18  04:45    


 


INR  1.02  (0.5-1.4)   18  04:45    


 


PTT (Actin FS)  22.1 SECONDS (26.0-38.0)  L  18  19:40    


 


Specimen Source  Arterial   18  17:39    


 


Sample Site  Right Radial   18  17:39    


 


pH  7.36  (7.35-7.45)   18  17:39    


 


pCO2  41.0 mmHg (35.0-45.0)   18  17:39    


 


pO2  227.0 mmHg (80.0-100.0)  H  18  17:39    


 


HCO3  23.3 mEq/L (20.0-26.0)   18  17:39    


 


Base Excess  -2.2 mEq/L (-3.0-3.0)   18  17:39    


 


O2 Saturation  100.0 % (92.0-100.0)   18  17:39    


 


Boubacar Test  Positive   18  17:39    


 


Vent Rate  18   18  17:39    


 


Inspired O2  100   18  17:39    


 


Tidal Volume  450   18  17:39    


 


PEEP  5   18  17:39    


 


Pressure (ins/psv/peep)  NA   18  17:39    


 


Critical Value  SC   18  17:39    


 


Sodium  147 mEq/L (136-145)  H  18  04:05    


 


Potassium  3.4 mEq/L (3.5-5.1)  L  18  04:05    


 


Chloride  116 mEq/L ()  H  18  04:05    


 


Carbon Dioxide  21.3 mEq/L (21.0-31.0)   18  04:05    


 


Anion Gap  13.1  (7.0-16.0)   18  04:05    


 


BUN  34 mg/dL (7-25)  H  18  04:05    


 


Creatinine  1.5 mg/dL (0.6-1.2)  H  18  04:05    


 


Est GFR ( Amer)  TNP   18  04:05    


 


Est GFR (Non-Af Amer)  TNP   18  04:05    


 


BUN/Creatinine Ratio  22.7   18  04:05    


 


Glucose  293 mg/dL ()  H  18  04:05    


 


POC Glucose  204 MG/DL (70 - 105)  H  18  07:33    


 


Whole Bld Lactic Acid  2.46 mmol/L (0.60-1.99)  H*  18  10:07    


 


Calcium  8.3 mg/dL (8.6-10.3)  L  18  04:05    


 


Phosphorus  2.1 mg/dL (2.5-5.0)  L  18  04:05    


 


Magnesium  2.2 mg/dL (1.9-2.7)   18  04:45    


 


Total Bilirubin  0.5 mg/dL (0.3-1.0)   18  04:05    


 


AST  35 U/L (13-39)   18  04:05    


 


ALT  13 U/L (7-52)   18  04:05    


 


Alkaline Phosphatase  63 U/L ()   18  04:05    


 


Total Protein  4.7 gm/dL (6.0-8.3)  L  18  04:05    


 


Albumin  2.5 gm/dL (3.7-5.3)  L  18  04:05    


 


Globulin  2.2 gm/dL  18  04:05    


 


Albumin/Globulin Ratio  1.1  (1.0-1.8)   18  04:05    


 


Triglycerides  126 mg/dL (<150)   18  04:10    


 


Cholesterol  124 mg/dL (<200)   18  04:10    


 


LDL Cholesterol Direct  84 mg/dL ()   18  04:10    


 


HDL Cholesterol  30 mg/dL (23-92)   18  04:10    


 


TSH  0.75 uIU/ml (0.34-5.60)   18  04:10    


 


Helicobacter pylori Ab  NEGATIVE  (NEGATIVE)   18  08:35    


 


Blood Type  O POSITIVE   18  19:40    


 


Antibody Screen  NEGATIVE   18  19:40    


 


Crossmatch  See Detail   18  19:40    














- Physical Exam


Vitals and I&O: 


 Vital Signs











Temp  98.4 F   18 06:00


 


Pulse  83   18 08:11


 


Resp  18   18 06:42


 


BP  147/64   18 08:11


 


Pulse Ox  100   18 06:42








 Intake & Output











 18





 18:59 06:59 18:59


 


Intake Total 2249.167 1381.167 13.833


 


Output Total 550  200


 


Balance 0497.814 6439.167 -186.167


 


Weight (lbs) 67.812 kg  68.13 kg


 


Intake:   


 


  Intake, IV Amount 7080.124 5996.167 13.833


 


    Azithromycin 500 mg In  250 





    Sodium Chloride 0.9% 250   





    ml @ 250 mls/hr IV Q24HR   





    [facility] Rx#:[id number]   


 


    D5-0.45NS 1,000 ml @ 50 1000 845 





    mls/hr IV .Q20H [facility] Rx#:   





    [id number]   


 


    Pantoprazole 80 mg In 69.167 186.167 13.833





    Sodium Chloride 0.9% 100   





    ml @ 10 mls/hr IV Q10H   





    [facility] Rx#:[id number]   


 


    Piperacillin Sodium/ 50 100 





    Tazobact 2.25 gm In   





    Sodium Chloride 0.9% 50   





    ml @ 100 mls/hr IV Q6H   





    [facility] Rx#:[id number]   


 


    Vancomycin HCl 1 gm In 250  





    Sodium Chloride 0.9% 250   





    ml @ 165 mls/hr IV Q24H   





    [facility] Rx#:[id number]   


 


  Blood Product 500  


 


  Other 380  


 


Output:   


 


  Urine 550  200


 


Other:   


 


  # Bowel Movements 0  1


 


  Weight Source Bedscale  Bedscale











Active Medications: 


Current Medications





Acetaminophen (Tylenol)  650 mg PO Q4HR PRN


   PRN Reason: MILD PAIN 1-3


   Stop: 19 17:58


Acetaminophen (Tylenol)  650 mg PO Q4HR PRN


   PRN Reason: TEMP >100.4


   Stop: 19 17:58


Acetylcysteine (Mucomyst 20%)  3 ml HHN Q4HRT [facility]


   Stop: 19 14:59


   Last Admin: 18 06:42 Dose:  3 ml


Albuterol/Ipratropium (Duoneb Neb)  3 ml HHN Q4HRT [facility]


   Stop: 19 14:59


   Last Admin: 18 06:42 Dose:  3 ml


Amlodipine Besylate (Norvasc)  5 mg PO DAILY [facility]


   Stop: 19 08:59


   Last Admin: 18 08:11 Dose:  5 mg


Ascorbic Acid (Vitamin C)  500 mg PO DAILY [facility]


   Stop: 19 08:59


   Last Admin: 18 08:11 Dose:  500 mg


Bisacodyl (Dulcolax 10 Mg Supp)  10 mg RC DAILY PRN


   PRN Reason: IF MOM INEFFECTIVE


   Stop: 19 17:58


Chlorhexidine Gluconate (Peridex)  15 ml MM 0800, [facility]


   Stop: 19 19:59


   Last Admin: 18 08:05 Dose:  15 ml


Azithromycin 500 mg/ Sodium (Chloride)  250 mls @ 250 mls/hr IV Q24HR [facility]


   Stop: 19 22:29


   Last Infusion: 18 23:23 Dose:  Infused


Dextrose/Sodium Chloride (D5-0.45ns)  1,000 mls @ 50 mls/hr IV .Q20H [facility]


   Stop: 19 17:29


   Last Infusion: 18 06:44 Dose:  50 mls/hr


Diltiazem HCl 125 mg/ Dextrose  100 mls @ 0 mls/hr IV Q8H PRN; Protocol


   PRN Reason: blood pressure


   Stop: 19 07:48


   Last Admin: 18 09:00 Dose:  6.25 mg/hr, 5 mls/hr


Vancomycin HCl 1 gm/ Sodium (Chloride)  250 mls @ 165 mls/hr IV Q24H [facility]


   Stop: 19 09:59


   Last Infusion: 18 11:30 Dose:  Infused


Piperacillin Sod/Tazobactam (Sod 2.25 gm/ Sodium Chloride)  50 mls @ 100 mls/hr 

IV Q6H [facility]


   Stop: 19 13:59


   Last Admin: 18 08:03 Dose:  100 mls/hr


Potassium Phosphate 20 mmole/ (Sodium Chloride)  256.6667 mls @ 42.5 mls/hr IV 

X1 ONE


   Stop: 18 13:34


   Last Admin: 18 08:02 Dose:  42.5 mls/hr


Insulin Aspart (Novolog Insulin Sliding Scale)  0 units SUBQ Q6HR [facility]; Protocol


   Stop: 19 01:14


   Last Admin: 18 07:36 Dose:  5 units


Lactobacillus Rhamnosus (Culturelle 15b)  1 each PO DAILY [facility]


   Stop: 19 08:59


   Last Admin: 18 08:11 Dose:  1 each


Lorazepam (Ativan)  1 mg IVP Q4HR PRN; Protocol


   PRN Reason: Agitation


   Stop: 19 07:12


   Last Admin: 18 08:29 Dose:  1 mg


Miscellaneous (Probiotic Screen)  1 ea  PRN PRN


   PRN Reason: PROTOCOL


   Stop: 19 15:29


Miscellaneous (Vancomycin Iv Per Pharmacy)  1 [facility] PRN [facility]


   Stop: 19 23:44


Miscellaneous (Zosyn Iv Per Pharmacy)  1 [facility] PRN PRN


   PRN Reason: PROTOCOL


   Stop: 19 12:03


Morphine Sulfate (Morphine)  1 mg IVP Q4HR PRN


   PRN Reason: Pain (Moderate)


   Stop: 19 10:04


   Last Admin: 18 11:40 Dose:  1 mg


Mupirocin (Bactroban Oint)  1 appl NS BID [facility]


   Stop: 18 17:01


   Last Admin: 18 08:13 Dose:  1 appl


Pantoprazole Sodium (Protonix)  40 mg IVP BID [facility]


   Stop: 19 08:59


Polyethylene Glycol (Miralax)  17 gm PO BID [facility]


   Stop: 19 08:59








General: weak


HEENT: NC/AT


Neck: Supple


Lungs: CTAB


Cardiovascular: Normal S1, Normal S2


Abdomen: non-tender


Extremities: clear, edema


Neurological: no change





Internal Medicine Assmt/Plan





- Assessment


Assessment: 





 Current Active Problems











Problem Status Onset


 


COFFEE GROUND EMESIS Acute 








 r/o gi bleed 


Large hiatal hernia


lactic acidosis possible sepsis


htn


 DM


Dyslipidemia


Dementia 





- Plan


Plan: 





id consult


surgical consult


ivabx as ordered


gi consult 


cpm 





Nutritional Asmnt/Malnutr-PDOC





- Dietary Evaluation


Malnutrition Findings (Please click <Entered> for more info): 








Nutritional Asmnt/Malnutrition                             Start:  18 15:

21


Text:                                                      Status: Complete    

  


Freq:                                                                          

  


Protocol:                                                                      

  


 Document     18 15:22  [facility]  (Rec: 18 15:44  [facility] [id number])


 Nutritional Asmnt/Malnutrition


     Patient General Information


      Nutritional Screening                      High Risk


      Diagnosis                                  sepsis, dehydration, pneumonia


      Pertinent Medical Hx/Surgical Hx           HTN, DM, dyslipidemia,


                                                 dementia, anemia, muscle


                                                 weakness


      Subjective Information                     Pt receiving care from RN at


                                                 time of visit. Per nurse notes


                                                 , pt had coffee ground emesis,


                                                 NGT was unable to be inserted


                                                 d/t large retro cardiac


                                                 hiatal hernia, and will have


                                                 EGD tomorrow. Spoke w/ LEVON Lebron by phone who verfied


                                                 the nurse notes and states pt


                                                 will continue to be NPO.


      Current Diet Order/ Nutrition Support      NPO


      Pertinent Medications                      D5-0.45ns, novolog, culturelle


                                                 , pantoprazole


      Pertinent Labs                             : glucose 392, Na 144, Cl


                                                 110, BUN 78, Alb 2.9, 


                                                 -423


                                                 : glucose 547, Na 134, Cl


                                                 97, BUN 69, Alb 3.0, -


                                                 485


     Nutritional Hx/Data


      Height                                     1.63 m


      Height (Calculated Centimeters)            162.6


      Current Weight (lbs)                       64.682 kg


      Weight (Calculated Kilograms)              64.7


      Weight (Calculated Grams)                  46978.3


      Ideal Body Weight                          120 lb


      Body Mass Index (BMI)                      24.5


      Weight Status                              Approriate


     GI Symptoms


      GI Symptoms                                Nausea


                                                 Vomitting


      Last BM                                    none noted


      Difficult in:                              None


      Food Allergies                             No


      Skin Integrity/Comment:                    cristal, angelica 13


      Current %PO                                Negligible < 25%


     Estimated Nutritional Goals


      BEE in Kcals:                              Using Current wt


      Calories/Kcals/Kg                          30-35


      Kcals Calculated                           [id number]


      Protein:                                   Using Current wt


      Protein g/k.2-1.5


      Protein Calculated                         78-97 g


      Fluid: ml                                  per MD


     Nutritional Problem


      2. Problem


       Problem                                   Altered nutrition related lab


                                                 values


       Etiology                                  dehydration, endocrine


                                                 dysfunction


       Signs/Symptoms:                           Cl 110, BUN 78, glucose 392,


                                                 -423


      1. Problem


       Problem                                   Increased nutrient needs


       Etiology                                  metabolic stress


       Signs/Symptoms:                           dx of sepsis and pneumonia


     Malnutrition Related to Morbid Obesity


      Malnutrition related to morbid obesity     No


     Intervention/Recommendation


      Comments                                   1. Monitor NPO status and


                                                 advance diet when medically


                                                 appropriate


                                                 2. Consider alternative


                                                 nutrition route if pt unable


                                                 to tolerate PO intake


                                                 3. Monitor wt, nutrition


                                                 related labs, and skin


                                                 integrity


                                                 4. F/U as high risk in 2-3


                                                 days, -


     Expected Outcomes/Goals


      Expected Outcomes/Goals                    1. Pt to start oral intake or


                                                 alternate nutrition route if


                                                 necessary


                                                 2. Wt stability, skin to


                                                 remain intact, nutrition


                                                 related labs to approach


                                                 normal limits


                                                 Reveiwed by Lisa Haas RD

## 2018-11-23 NOTE — GENERAL PROGRESS NOTE
Subjective





- Review of Systems


Events since last encounter: 





18


started on NGT feedings





Objective





- Results


Result Diagrams: 


 18 04:05





 18 04:05


Recent Labs: 


 Laboratory Last Values











WBC  18.7 Th/cmm (4.8-10.8)  H  18  04:05    


 


RBC  3.12 Mil/cmm (3.80-5.20)  L  18  04:05    


 


Hgb  9.2 gm/dL (12-16)  L  18  04:05    


 


Hct  27.1 % (41.0-60)  L  18  04:05    


 


MCV  86.8 fl ()   18  04:05    


 


MCH  29.3 pg (27.0-31.0)   18  04:05    


 


MCHC Differential  33.8 pg (28.0-36.0)   18  04:05    


 


RDW  13.5 % (11.5-20.0)   18  04:05    


 


Plt Count  222 Th/cmm (150-400)   18  04:05    


 


MPV  8.9 fl  18  04:05    


 


Add Manual Diff  YES   18  06:10    


 


Neutrophils %  79.9 % (40.0-80.0)   18  04:05    


 


Band Neutrophils %  5 % (0-10)   18  04:45    


 


Lymphocytes %  12.9 % (20.0-50.0)  L  18  04:05    


 


Monocytes %  6.6 % (2.0-10.0)   18  04:05    


 


Eosinophils %  0.5 % (0.0-5.0)   18  04:05    


 


Basophils %  0.1 % (0.0-2.0)   18  04:05    


 


Neutrophils (Manual)  75 % (40-80)   18  06:10    


 


Lymphocytes  19 % (20-50)  L  18  06:10    


 


Monocytes  6 % (2-10)   18  06:10    


 


Eosinophils  3 % (0-5)   18  04:45    


 


Basophils  1 % (0-3)   18  04:45    


 


Platelet Estimate  ADEQUATE  (NORMAL)   18  06:10    


 


Anisocytosis  1+   18  06:10    


 


PT  10.6 SECONDS (9.5-11.5)   18  04:45    


 


INR  1.02  (0.5-1.4)   18  04:45    


 


PTT (Actin FS)  22.1 SECONDS (26.0-38.0)  L  18  19:40    


 


Specimen Source  Arterial   18  17:39    


 


Sample Site  Right Radial   18  17:39    


 


pH  7.36  (7.35-7.45)   18  17:39    


 


pCO2  41.0 mmHg (35.0-45.0)   18  17:39    


 


pO2  227.0 mmHg (80.0-100.0)  H  18  17:39    


 


HCO3  23.3 mEq/L (20.0-26.0)   18  17:39    


 


Base Excess  -2.2 mEq/L (-3.0-3.0)   18  17:39    


 


O2 Saturation  100.0 % (92.0-100.0)   18  17:39    


 


Boubacar Test  Positive   18  17:39    


 


Vent Rate  18   18  17:39    


 


Inspired O2  100   18  17:39    


 


Tidal Volume  450   18  17:39    


 


PEEP  5   18  17:39    


 


Pressure (ins/psv/peep)  NA   18  17:39    


 


Critical Value  SC   18  17:39    


 


Sodium  147 mEq/L (136-145)  H  18  04:05    


 


Potassium  3.4 mEq/L (3.5-5.1)  L  18  04:05    


 


Chloride  116 mEq/L ()  H  18  04:05    


 


Carbon Dioxide  21.3 mEq/L (21.0-31.0)   18  04:05    


 


Anion Gap  13.1  (7.0-16.0)   18  04:05    


 


BUN  34 mg/dL (7-25)  H  18  04:05    


 


Creatinine  1.5 mg/dL (0.6-1.2)  H  18  04:05    


 


Est GFR ( Amer)  TNP   18  04:05    


 


Est GFR (Non-Af Amer)  TNP   18  04:05    


 


BUN/Creatinine Ratio  22.7   18  04:05    


 


Glucose  293 mg/dL ()  H  18  04:05    


 


POC Glucose  204 MG/DL (70 - 105)  H  18  07:33    


 


Whole Bld Lactic Acid  2.46 mmol/L (0.60-1.99)  H*  18  10:07    


 


Calcium  8.3 mg/dL (8.6-10.3)  L  18  04:05    


 


Phosphorus  2.1 mg/dL (2.5-5.0)  L  18  04:05    


 


Magnesium  2.2 mg/dL (1.9-2.7)   18  04:45    


 


Total Bilirubin  0.5 mg/dL (0.3-1.0)   18  04:05    


 


AST  35 U/L (13-39)   18  04:05    


 


ALT  13 U/L (7-52)   18  04:05    


 


Alkaline Phosphatase  63 U/L ()   18  04:05    


 


Total Protein  4.7 gm/dL (6.0-8.3)  L  18  04:05    


 


Albumin  2.5 gm/dL (3.7-5.3)  L  18  04:05    


 


Globulin  2.2 gm/dL  18  04:05    


 


Albumin/Globulin Ratio  1.1  (1.0-1.8)   18  04:05    


 


Triglycerides  126 mg/dL (<150)   18  04:10    


 


Cholesterol  124 mg/dL (<200)   18  04:10    


 


LDL Cholesterol Direct  84 mg/dL ()   18  04:10    


 


HDL Cholesterol  30 mg/dL (23-92)   18  04:10    


 


TSH  0.75 uIU/ml (0.34-5.60)   18  04:10    


 


Helicobacter pylori Ab  NEGATIVE  (NEGATIVE)   18  08:35    


 


Blood Type  O POSITIVE   18  19:40    


 


Antibody Screen  NEGATIVE   18  19:40    


 


Crossmatch  See Detail   11/19/18  19:40    














- Physical Exam


Vitals and I&O: 


 Vital Signs











Temp  98.4 F   18 06:00


 


Pulse  82   18 09:40


 


Resp  18   18 06:42


 


BP  147/64   18 08:11


 


Pulse Ox  100   18 09:40








 Intake & Output











 18





 18:59 06:59 18:59


 


Intake Total 2249.167 1381.167 13.833


 


Output Total 550  200


 


Balance 9703.978 9133.167 -186.167


 


Weight (lbs) 67.812 kg  68.13 kg


 


Intake:   


 


  Intake, IV Amount 8298.147 8550.167 13.833


 


    Azithromycin 500 mg In  250 





    Sodium Chloride 0.9% 250   





    ml @ 250 mls/hr IV Q24HR   





    [facility] Rx#:[id number]   


 


    D5-0.45NS 1,000 ml @ 50 1000 845 





    mls/hr IV .Q20H [facility] Rx#:   





    [id number]   


 


    Pantoprazole 80 mg In 69.167 186.167 13.833





    Sodium Chloride 0.9% 100   





    ml @ 10 mls/hr IV Q10H   





    [facility] Rx#:[id number]   


 


    Piperacillin Sodium/ 50 100 





    Tazobact 2.25 gm In   





    Sodium Chloride 0.9% 50   





    ml @ 100 mls/hr IV Q6H   





    [facility] Rx#:[id number]   


 


    Vancomycin HCl 1 gm In 250  





    Sodium Chloride 0.9% 250   





    ml @ 165 mls/hr IV Q24H   





    [facility] Rx#:[id number]   


 


  Blood Product 500  


 


  Other 380  


 


Output:   


 


  Urine 550  200


 


Other:   


 


  # Bowel Movements 0  1


 


  Weight Source Bedscale  Bedscale











Active Medications: 


Current Medications





Acetaminophen (Tylenol)  650 mg PO Q4HR PRN


   PRN Reason: MILD PAIN 1-3


   Stop: 19 17:58


Acetaminophen (Tylenol)  650 mg PO Q4HR PRN


   PRN Reason: TEMP >100.4


   Stop: 19 17:58


Acetylcysteine (Mucomyst 20%)  3 ml HHN Q4HRT [facility]


   Stop: 19 14:59


   Last Admin: 18 06:42 Dose:  3 ml


Albuterol/Ipratropium (Duoneb Neb)  3 ml HHN Q4HRT [facility]


   Stop: 19 14:59


   Last Admin: 18 06:42 Dose:  3 ml


Amlodipine Besylate (Norvasc)  5 mg PO DAILY [facility]


   Stop: 19 08:59


   Last Admin: 18 08:11 Dose:  5 mg


Ascorbic Acid (Vitamin C)  500 mg PO DAILY [facility]


   Stop: 19 08:59


   Last Admin: 18 08:11 Dose:  500 mg


Bisacodyl (Dulcolax 10 Mg Supp)  10 mg RC DAILY PRN


   PRN Reason: IF MOM INEFFECTIVE


   Stop: 19 17:58


Chlorhexidine Gluconate (Peridex)  15 ml MM 0800, [facility]


   Stop: 19 19:59


   Last Admin: 18 08:05 Dose:  15 ml


Azithromycin 500 mg/ Sodium (Chloride)  250 mls @ 250 mls/hr IV Q24HR [facility]


   Stop: 19 22:29


   Last Infusion: 18 23:23 Dose:  Infused


Dextrose/Sodium Chloride (D5-0.45ns)  1,000 mls @ 50 mls/hr IV .Q20H [facility]


   Stop: 19 17:29


   Last Infusion: 18 06:44 Dose:  50 mls/hr


Diltiazem HCl 125 mg/ Dextrose  100 mls @ 0 mls/hr IV Q8H PRN; Protocol


   PRN Reason: blood pressure


   Stop: 19 07:48


   Last Admin: 18 09:00 Dose:  6.25 mg/hr, 5 mls/hr


Vancomycin HCl 1 gm/ Sodium (Chloride)  250 mls @ 165 mls/hr IV Q24H [facility]


   Stop: 19 09:59


   Last Admin: 18 10:25 Dose:  165 mls/hr


Piperacillin Sod/Tazobactam (Sod 2.25 gm/ Sodium Chloride)  50 mls @ 100 mls/hr 

IV Q6H [facility]


   Stop: 19 13:59


   Last Admin: 18 08:03 Dose:  100 mls/hr


Potassium Phosphate 20 mmole/ (Sodium Chloride)  256.6667 mls @ 42.5 mls/hr IV 

X1 ONE


   Stop: 18 13:34


   Last Admin: 18 08:02 Dose:  42.5 mls/hr


Insulin Aspart (Novolog Insulin Sliding Scale)  0 units SUBQ Q6HR [facility]; Protocol


   Stop: 19 01:14


   Last Admin: 18 07:36 Dose:  5 units


Lactobacillus Rhamnosus (Culturelle 15b)  1 each PO DAILY [facility]


   Stop: 19 08:59


   Last Admin: 18 08:11 Dose:  1 each


Lorazepam (Ativan)  1 mg IVP Q4HR PRN; Protocol


   PRN Reason: Agitation


   Stop: 19 07:12


   Last Admin: 18 08:29 Dose:  1 mg


Miscellaneous (Probiotic Screen)  1 ea  PRN PRN


   PRN Reason: PROTOCOL


   Stop: 19 15:29


Miscellaneous (Vancomycin Iv Per Pharmacy)  1 ea  PRN [facility]


   Stop: 19 23:44


Miscellaneous (Zosyn Iv Per Pharmacy)  1 [facility] PRN PRN


   PRN Reason: PROTOCOL


   Stop: 19 12:03


Morphine Sulfate (Morphine)  1 mg IVP Q4HR PRN


   PRN Reason: Pain (Moderate)


   Stop: 19 10:04


   Last Admin: 18 11:40 Dose:  1 mg


Mupirocin (Bactroban Oint)  1 appl NS BID [facility]


   Stop: 18 17:01


   Last Admin: 18 08:13 Dose:  1 appl


Pantoprazole Sodium (Protonix)  40 mg IVP BID [facility]


   Stop: 19 08:59


Polyethylene Glycol (Miralax)  17 gm PO BID [facility]


   Stop: 19 08:59








General: Alert


HEENT: Atraumatic


Neck: Supple


Cardiovascular: no Regular rate


Abdomen: Bowel sounds, Soft, no Tender, no Hepatomegaly, no Splenomegaly, no 

Distended, no Rebound





Assessment/Plan





- Problem List


Patient Problems: 


All Active Problems





COFFEE GROUND EMESIS (Acute) 











Nutritional Asmnt/Malnutr-PDOC





- Dietary Evaluation


Malnutrition Findings (Please click <Entered> for more info): 








Nutritional Asmnt/Malnutrition                             Start:  18 15:

21


Text:                                                      Status: Complete    

  


Freq:                                                                          

  


Protocol:                                                                      

  


 Document     18 15:22  [place]  (Rec: 18 15:44  DYANG  [email or web address])


 Nutritional Asmnt/Malnutrition


     Patient General Information


      Nutritional Screening                      High Risk


      Diagnosis                                  sepsis, dehydration, pneumonia


      Pertinent Medical Hx/Surgical Hx           HTN, DM, dyslipidemia,


                                                 dementia, anemia, muscle


                                                 weakness


      Subjective Information                     Pt receiving care from RN at


                                                 time of visit. Per nurse notes


                                                 , pt had coffee ground emesis,


                                                 NGT was unable to be inserted


                                                 d/t large retro cardiac


                                                 hiatal hernia, and will have


                                                 EGD tomorrow. Spoke w/ RN


                                                 Vi by phone who verfied


                                                 the nurse notes and states pt


                                                 will continue to be NPO.


      Current Diet Order/ Nutrition Support      NPO


      Pertinent Medications                      D5-0.45ns, novolog, culturelle


                                                 , pantoprazole


      Pertinent Labs                             : glucose 392, Na 144, Cl


                                                 110, BUN 78, Alb 2.9, 


                                                 -423


                                                 : glucose 547, Na 134, Cl


                                                 97, BUN 69, Alb 3.0, -


                                                 485


     Nutritional Hx/Data


      Height                                     1.63 m


      Height (Calculated Centimeters)            162.6


      Current Weight (lbs)                       64.682 kg


      Weight (Calculated Kilograms)              64.7


      Weight (Calculated Grams)                  13136.3


      Ideal Body Weight                          120 lb


      Body Mass Index (BMI)                      24.5


      Weight Status                              Approriate


     GI Symptoms


      GI Symptoms                                Nausea


                                                 Vomitting


      Last BM                                    none noted


      Difficult in:                              None


      Food Allergies                             No


      Skin Integrity/Comment:                    angelica bee 13


      Current %PO                                Negligible < 25%


     Estimated Nutritional Goals


      BEE in Kcals:                              Using Current wt


      Calories/Kcals/Kg                          30-35


      Kcals Calculated                           6205-1353


      Protein:                                   Using Current wt


      Protein g/k.2-1.5


      Protein Calculated                         78-97 g


      Fluid: ml                                  per MD


     Nutritional Problem


      2. Problem


       Problem                                   Altered nutrition related lab


                                                 values


       Etiology                                  dehydration, endocrine


                                                 dysfunction


       Signs/Symptoms:                           Cl 110, BUN 78, glucose 392,


                                                 -423


      1. Problem


       Problem                                   Increased nutrient needs


       Etiology                                  metabolic stress


       Signs/Symptoms:                           dx of sepsis and pneumonia


     Malnutrition Related to Morbid Obesity


      Malnutrition related to morbid obesity     No


     Intervention/Recommendation


      Comments                                   1. Monitor NPO status and


                                                 advance diet when medically


                                                 appropriate


                                                 2. Consider alternative


                                                 nutrition route if pt unable


                                                 to tolerate PO intake


                                                 3. Monitor wt, nutrition


                                                 related labs, and skin


                                                 integrity


                                                 4. F/U as high risk in 2-3


                                                 days, -


     Expected Outcomes/Goals


      Expected Outcomes/Goals                    1. Pt to start oral intake or


                                                 alternate nutrition route if


                                                 necessary


                                                 2. Wt stability, skin to


                                                 remain intact, nutrition


                                                 related labs to approach


                                                 normal limits


                                                 Reveiwed by Lisa Haas RD

## 2018-11-23 NOTE — GI PROGRESS NOTE
Subjective





- Review of Systems


Service Date: 11/23/18


Subjective: 


Intubated yesterday for continued respiratory distress, no further GI bleed





Objective





- Results


Result Diagrams: 


 11/23/18 04:05





 11/23/18 04:05


Recent Labs: 


 Laboratory Last Values











WBC  18.7 Th/cmm (4.8-10.8)  H  11/23/18  04:05    


 


RBC  3.12 Mil/cmm (3.80-5.20)  L  11/23/18  04:05    


 


Hgb  9.2 gm/dL (12-16)  L  11/23/18  04:05    


 


Hct  27.1 % (41.0-60)  L  11/23/18  04:05    


 


MCV  86.8 fl ()   11/23/18  04:05    


 


MCH  29.3 pg (27.0-31.0)   11/23/18  04:05    


 


MCHC Differential  33.8 pg (28.0-36.0)   11/23/18  04:05    


 


RDW  13.5 % (11.5-20.0)   11/23/18  04:05    


 


Plt Count  222 Th/cmm (150-400)   11/23/18  04:05    


 


MPV  8.9 fl  11/23/18  04:05    


 


Add Manual Diff  YES   11/22/18  06:10    


 


Neutrophils %  79.9 % (40.0-80.0)   11/23/18  04:05    


 


Band Neutrophils %  5 % (0-10)   11/21/18  04:45    


 


Lymphocytes %  12.9 % (20.0-50.0)  L  11/23/18  04:05    


 


Monocytes %  6.6 % (2.0-10.0)   11/23/18  04:05    


 


Eosinophils %  0.5 % (0.0-5.0)   11/23/18  04:05    


 


Basophils %  0.1 % (0.0-2.0)   11/23/18  04:05    


 


Neutrophils (Manual)  75 % (40-80)   11/22/18  06:10    


 


Lymphocytes  19 % (20-50)  L  11/22/18  06:10    


 


Monocytes  6 % (2-10)   11/22/18  06:10    


 


Eosinophils  3 % (0-5)   11/21/18  04:45    


 


Basophils  1 % (0-3)   11/21/18  04:45    


 


Platelet Estimate  ADEQUATE  (NORMAL)   11/22/18  06:10    


 


Anisocytosis  1+   11/22/18  06:10    


 


PT  10.6 SECONDS (9.5-11.5)   11/21/18  04:45    


 


INR  1.02  (0.5-1.4)   11/21/18  04:45    


 


PTT (Actin FS)  22.1 SECONDS (26.0-38.0)  L  11/19/18  19:40    


 


Specimen Source  Arterial   11/22/18  17:39    


 


Sample Site  Right Radial   11/22/18  17:39    


 


pH  7.36  (7.35-7.45)   11/22/18  17:39    


 


pCO2  41.0 mmHg (35.0-45.0)   11/22/18  17:39    


 


pO2  227.0 mmHg (80.0-100.0)  H  11/22/18  17:39    


 


HCO3  23.3 mEq/L (20.0-26.0)   11/22/18  17:39    


 


Base Excess  -2.2 mEq/L (-3.0-3.0)   11/22/18  17:39    


 


O2 Saturation  100.0 % (92.0-100.0)   11/22/18  17:39    


 


Boubacar Test  Positive   11/22/18  17:39    


 


Vent Rate  18   11/22/18  17:39    


 


Inspired O2  100   11/22/18  17:39    


 


Tidal Volume  450   11/22/18  17:39    


 


PEEP  5   11/22/18  17:39    


 


Pressure (ins/psv/peep)  NA   11/22/18  17:39    


 


Critical Value  SC   11/22/18  17:39    


 


Sodium  147 mEq/L (136-145)  H  11/23/18  04:05    


 


Potassium  3.4 mEq/L (3.5-5.1)  L  11/23/18  04:05    


 


Chloride  116 mEq/L ()  H  11/23/18  04:05    


 


Carbon Dioxide  21.3 mEq/L (21.0-31.0)   11/23/18  04:05    


 


Anion Gap  13.1  (7.0-16.0)   11/23/18  04:05    


 


BUN  34 mg/dL (7-25)  H  11/23/18  04:05    


 


Creatinine  1.5 mg/dL (0.6-1.2)  H  11/23/18  04:05    


 


Est GFR ( Amer)  TNP   11/23/18  04:05    


 


Est GFR (Non-Af Amer)  TNP   11/23/18  04:05    


 


BUN/Creatinine Ratio  22.7   11/23/18  04:05    


 


Glucose  293 mg/dL ()  H  11/23/18  04:05    


 


POC Glucose  204 MG/DL (70 - 105)  H  11/23/18  07:33    


 


Whole Bld Lactic Acid  2.46 mmol/L (0.60-1.99)  H*  11/20/18  10:07    


 


Calcium  8.3 mg/dL (8.6-10.3)  L  11/23/18  04:05    


 


Phosphorus  2.1 mg/dL (2.5-5.0)  L  11/23/18  04:05    


 


Magnesium  2.2 mg/dL (1.9-2.7)   11/21/18  04:45    


 


Total Bilirubin  0.5 mg/dL (0.3-1.0)   11/23/18  04:05    


 


AST  35 U/L (13-39)   11/23/18  04:05    


 


ALT  13 U/L (7-52)   11/23/18  04:05    


 


Alkaline Phosphatase  63 U/L ()   11/23/18  04:05    


 


Total Protein  4.7 gm/dL (6.0-8.3)  L  11/23/18  04:05    


 


Albumin  2.5 gm/dL (3.7-5.3)  L  11/23/18  04:05    


 


Globulin  2.2 gm/dL  11/23/18  04:05    


 


Albumin/Globulin Ratio  1.1  (1.0-1.8)   11/23/18  04:05    


 


Triglycerides  126 mg/dL (<150)   11/20/18  04:10    


 


Cholesterol  124 mg/dL (<200)   11/20/18  04:10    


 


LDL Cholesterol Direct  84 mg/dL ()   11/20/18  04:10    


 


HDL Cholesterol  30 mg/dL (23-92)   11/20/18  04:10    


 


TSH  0.75 uIU/ml (0.34-5.60)   11/20/18  04:10    


 


Helicobacter pylori Ab  NEGATIVE  (NEGATIVE)   11/21/18  08:35    


 


Blood Type  O POSITIVE   11/19/18  19:40    


 


Antibody Screen  NEGATIVE   11/19/18  19:40    


 


Crossmatch  See Detail   11/19/18  19:40    














- Physical Exam


Vitals and I&O: 


 Vital Signs











Temp  98.4 F   11/23/18 06:00


 


Pulse  85   11/23/18 06:45


 


Resp  18   11/23/18 06:42


 


BP  157/59   11/23/18 06:45


 


Pulse Ox  100   11/23/18 06:42








 Intake & Output











 11/22/18 11/23/18 11/23/18





 18:59 06:59 18:59


 


Intake Total 2249.167 1381.167 


 


Output Total 550  200


 


Balance 6350.580 4021.167 -200


 


Weight (lbs) 67.812 kg  68.13 kg


 


Intake:   


 


  Intake, IV Amount 2931.224 5178.167 


 


    Azithromycin 500 mg In  250 





    Sodium Chloride 0.9% 250   





    ml @ 250 mls/hr IV Q24HR   





    [facility] Rx#:[id number]   


 


    D5-0.45NS 1,000 ml @ 50 1000 845 





    mls/hr IV .Q20H [facility] Rx#:   





    [id number]   


 


    Pantoprazole 80 mg In 69.167 186.167 





    Sodium Chloride 0.9% 100   





    ml @ 10 mls/hr IV Q10H   





    [facility] Rx#:[id number]   


 


    Piperacillin Sodium/ 50 100 





    Tazobact 2.25 gm In   





    Sodium Chloride 0.9% 50   





    ml @ 100 mls/hr IV Q6H   





    [facility] Rx#:[id number]   


 


    Vancomycin HCl 1 gm In 250  





    Sodium Chloride 0.9% 250   





    ml @ 165 mls/hr IV Q24H   





    [facility] Rx#:[id number]   


 


  Blood Product 500  


 


  Other 380  


 


Output:   


 


  Urine 550  200


 


Other:   


 


  # Bowel Movements 0  1


 


  Weight Source Bedscale  Bedscale











Active Medications: 


Current Medications





Acetaminophen (Tylenol)  650 mg PO Q4HR PRN


   PRN Reason: MILD PAIN 1-3


   Stop: 01/19/19 17:58


Acetaminophen (Tylenol)  650 mg PO Q4HR PRN


   PRN Reason: TEMP >100.4


   Stop: 01/19/19 17:58


Acetylcysteine (Mucomyst 20%)  3 ml HHN Q4HRT [facility]


   Stop: 01/21/19 14:59


   Last Admin: 11/23/18 06:42 Dose:  3 ml


Albuterol/Ipratropium (Duoneb Neb)  3 ml HHN Q4HRT [facility]


   Stop: 01/20/19 14:59


   Last Admin: 11/23/18 06:42 Dose:  3 ml


Amlodipine Besylate (Norvasc)  5 mg PO DAILY [facility]


   Stop: 01/20/19 08:59


   Last Admin: 11/22/18 09:43 Dose:  Not Given


Ascorbic Acid (Vitamin C)  500 mg PO DAILY [facility]


   Stop: 01/20/19 08:59


   Last Admin: 11/22/18 09:43 Dose:  Not Given


Bisacodyl (Dulcolax 10 Mg Supp)  10 mg RC DAILY PRN


   PRN Reason: IF MOM INEFFECTIVE


   Stop: 01/19/19 17:58


Chlorhexidine Gluconate (Peridex)  15 ml MM 0800,2000 [facility]


   Stop: 01/21/19 19:59


   Last Admin: 11/22/18 20:16 Dose:  15 ml


Azithromycin 500 mg/ Sodium (Chloride)  250 mls @ 250 mls/hr IV Q24HR [facility]


   Stop: 01/18/19 22:29


   Last Infusion: 11/22/18 23:23 Dose:  Infused


Dextrose/Sodium Chloride (D5-0.45ns)  1,000 mls @ 50 mls/hr IV .Q20H [facility]


   Stop: 01/20/19 17:29


   Last Infusion: 11/23/18 06:44 Dose:  50 mls/hr


Diltiazem HCl 125 mg/ Dextrose  100 mls @ 0 mls/hr IV Q8H PRN; Protocol


   PRN Reason: blood pressure


   Stop: 01/21/19 07:48


   Last Admin: 11/22/18 09:00 Dose:  6.25 mg/hr, 5 mls/hr


Vancomycin HCl 1 gm/ Sodium (Chloride)  250 mls @ 165 mls/hr IV Q24H [facility]


   Stop: 01/21/19 09:59


   Last Infusion: 11/22/18 11:30 Dose:  Infused


Piperacillin Sod/Tazobactam (Sod 2.25 gm/ Sodium Chloride)  50 mls @ 100 mls/hr 

IV Q6H [facility]


   Stop: 01/21/19 13:59


   Last Infusion: 11/23/18 02:45 Dose:  Infused


Potassium Phosphate 20 mmole/ (Sodium Chloride)  256.6667 mls @ 42.5 mls/hr IV 

X1 ONE


   Stop: 11/23/18 13:34


Insulin Aspart (Novolog Insulin Sliding Scale)  0 units SUBQ Q6HR [facility]; Protocol


   Stop: 01/19/19 01:14


   Last Admin: 11/23/18 07:36 Dose:  5 units


Lactobacillus Rhamnosus (Culturelle 15b)  1 each PO DAILY [facility]


   Stop: 01/20/19 08:59


   Last Admin: 11/22/18 09:42 Dose:  Not Given


Lorazepam (Ativan)  1 mg IVP Q4HR PRN; Protocol


   PRN Reason: Agitation


   Stop: 01/21/19 07:12


   Last Admin: 11/23/18 03:24 Dose:  1 mg


Miscellaneous (Probiotic Screen)  1 ea  PRN PRN


   PRN Reason: PROTOCOL


   Stop: 01/19/19 15:29


Miscellaneous (Vancomycin Iv Per Pharmacy)  1 ea [facility] PRN [facility]


   Stop: 01/20/19 23:44


Miscellaneous (Zosyn Iv Per Pharmacy)  1 ea  PRN PRN


   PRN Reason: PROTOCOL


   Stop: 01/21/19 12:03


Morphine Sulfate (Morphine)  1 mg IVP Q4HR PRN


   PRN Reason: Pain (Moderate)


   Stop: 01/19/19 10:04


   Last Admin: 11/22/18 11:40 Dose:  1 mg


Mupirocin (Bactroban Oint)  1 appl NS BID [facility]


   Stop: 11/26/18 17:01


   Last Admin: 11/22/18 16:22 Dose:  1 appl


Pantoprazole Sodium (Protonix)  40 mg IVP BID [facility]


   Stop: 01/22/19 08:59


Polyethylene Glycol (Miralax)  17 gm PO BID [facility]


   Stop: 01/22/19 08:59








General: Alert


HEENT: Atraumatic


Neck: Supple


Cardiovascular: no Regular rate


Abdomen: Bowel sounds, Soft, no Tender, no Hepatomegaly, no Splenomegaly, no 

Distended, no Rebound





Assessment/Plan





- Problem List


Patient Problems: 


All Active Problems





COFFEE GROUND EMESIS (Acute) 











- Assessment


Assessment: 


# Coffee ground emesis


# hiatal hernia


# Pneumonia


# Severe sepsis


# Fecal impaction





EGD on 11/21 showed 7cm hiatal hernia, with evidence of mucosal tear there from 

previous NG tube insertion attempts. 2 clips placed to this area. With some 

difficulty, an NG tube was endoscopically placed into the stomach. No stomach 

ulcer or mass. 





Now intubated, but no longer having GI bleed. Fecal impaction noted on KUB.








Plan:





- start NG tube feeding with glucerna, titrate up to goal slowly


- change ppi drip to bid


- tap water enema q 12, and miralax bid po


- cont IVF


- cont Abx


- can consider G tube insertion in the future, but she would need to be more 

stable for this from a respiratory standpoint

## 2018-11-23 NOTE — DIAGNOSTIC IMAGING REPORT
Exam: Portable chest x-ray



HISTORY: Intubation.



Prior exam of 11/21 2018



I needs:



Portable semierect examination of the chest reviewed the study

demonstrates endotracheal tube in the right mainstem bronchus should be

pulled back 9 cm.  There is evidence of basilar pneumonia superimposed

small left pleural effusion.  Mediastinal structures midline the heart

is enlarged.  The aortic arch calcified.



IMPRESSION: Endotracheal tube in the right mainstem bronchus should be

pulled back at least 9 cm.



Right lower lobe pneumonia



Peribronchial infiltrate left base with left pleural effusion.  NG tube

passes into the stomach.

## 2018-11-23 NOTE — DIAGNOSTIC IMAGING REPORT
Exam: Portable chest x-ray



HISTORY: Repositioning of the endotracheal tube



Prior exam: Same day earlier



Findings:



Portable examination of the chest at 0801 hours reviewed compared to

prior study same day earlier demonstrates repositioning of endotracheal

tube with the tip of the tube 3 cm above the ramos.



IMPRESSION: Satisfactory position endotracheal tube 3 cm above the

ramos.

## 2018-11-24 LAB
ANION GAP SERPL CALC-SCNC: 13.5 MMOL/L (ref 7–16)
BASOPHILS # BLD AUTO: 0.1 TH/CUMM (ref 0–0.2)
BASOPHILS NFR BLD AUTO: 0.6 % (ref 0–2)
BUN SERPL-MCNC: 37 MG/DL (ref 7–25)
CALCIUM SERPL-MCNC: 8.1 MG/DL (ref 8.6–10.3)
CHLORIDE SERPL-SCNC: 116 MEQ/L (ref 98–107)
CO2 SERPL-SCNC: 20.1 MEQ/L (ref 21–31)
CREAT SERPL-MCNC: 1.6 MG/DL (ref 0.6–1.2)
EOSINOPHIL # BLD AUTO: 0.1 TH/CMM (ref 0.1–0.4)
EOSINOPHIL NFR BLD AUTO: 0.5 % (ref 0–5)
ERYTHROCYTE [DISTWIDTH] IN BLOOD BY AUTOMATED COUNT: 13.4 % (ref 11.5–20)
GLUCOSE SERPL-MCNC: 288 MG/DL (ref 70–105)
HCT VFR BLD CALC: 27.4 % (ref 41–60)
HGB BLD-MCNC: 9.2 GM/DL (ref 12–16)
LYMPHOCYTE AB SER FC-ACNC: 1.9 TH/CMM (ref 1.5–3)
LYMPHOCYTES NFR BLD AUTO: 11.4 % (ref 20–50)
MCH RBC QN AUTO: 29.8 PG (ref 27–31)
MCHC RBC AUTO-ENTMCNC: 33.5 PG (ref 28–36)
MCV RBC AUTO: 88.9 FL (ref 81–100)
MONOCYTES # BLD AUTO: 1.1 TH/CMM (ref 0.3–1)
MONOCYTES NFR BLD AUTO: 6.7 % (ref 2–10)
NEUTROPHILS # BLD: 13.5 TH/CMM (ref 1.8–8)
NEUTROPHILS NFR BLD AUTO: 80.8 % (ref 40–80)
PCO2 BLDA: 34 MMHG (ref 35–45)
PHOSPHATE SERPL-MCNC: 2.8 MG/DL (ref 2.5–5)
PLATELET # BLD: 195 TH/CMM (ref 150–400)
PMV BLD AUTO: 9.1 FL
PO2 BLDA: 62 MMHG (ref 80–100)
POTASSIUM SERPL-SCNC: 3.6 MEQ/L (ref 3.5–5.1)
RBC # BLD AUTO: 3.09 MIL/CMM (ref 3.8–5.2)
SAO2 % BLDA: 93 % (ref 92–100)
SODIUM SERPL-SCNC: 146 MEQ/L (ref 136–145)
WBC # BLD AUTO: 16.7 TH/CMM (ref 4.8–10.8)

## 2018-11-24 RX ADMIN — POLYETHYLENE GLYCOL 3350 SCH GM: 17 POWDER, FOR SOLUTION ORAL at 17:29

## 2018-11-24 RX ADMIN — INSULIN ASPART SCH UNITS: 100 INJECTION, SOLUTION INTRAVENOUS; SUBCUTANEOUS at 01:00

## 2018-11-24 RX ADMIN — CHLORHEXIDINE GLUCONATE SCH ML: 1.2 RINSE ORAL at 08:43

## 2018-11-24 RX ADMIN — SODIUM CHLORIDE SCH MLS/HR: 9 INJECTION, SOLUTION INTRAVENOUS at 08:49

## 2018-11-24 RX ADMIN — INSULIN ASPART SCH UNITS: 100 INJECTION, SOLUTION INTRAVENOUS; SUBCUTANEOUS at 17:32

## 2018-11-24 RX ADMIN — Medication SCH EACH: at 08:42

## 2018-11-24 RX ADMIN — IPRATROPIUM BROMIDE AND ALBUTEROL SULFATE SCH ML: .5; 3 SOLUTION RESPIRATORY (INHALATION) at 07:09

## 2018-11-24 RX ADMIN — IPRATROPIUM BROMIDE AND ALBUTEROL SULFATE SCH ML: .5; 3 SOLUTION RESPIRATORY (INHALATION) at 15:35

## 2018-11-24 RX ADMIN — INSULIN ASPART SCH UNITS: 100 INJECTION, SOLUTION INTRAVENOUS; SUBCUTANEOUS at 06:48

## 2018-11-24 RX ADMIN — IPRATROPIUM BROMIDE AND ALBUTEROL SULFATE SCH ML: .5; 3 SOLUTION RESPIRATORY (INHALATION) at 11:03

## 2018-11-24 RX ADMIN — CHLORHEXIDINE GLUCONATE SCH ML: 1.2 RINSE ORAL at 21:00

## 2018-11-24 RX ADMIN — DEXTROSE AND SODIUM CHLORIDE SCH MLS/HR: 5; .45 INJECTION, SOLUTION INTRAVENOUS at 05:41

## 2018-11-24 RX ADMIN — SODIUM CHLORIDE SCH MLS/HR: 9 INJECTION, SOLUTION INTRAVENOUS at 14:00

## 2018-11-24 RX ADMIN — SODIUM CHLORIDE SCH MLS/HR: 9 INJECTION, SOLUTION INTRAVENOUS at 20:57

## 2018-11-24 RX ADMIN — SODIUM CHLORIDE SCH MLS/HR: 9 INJECTION, SOLUTION INTRAVENOUS at 02:10

## 2018-11-24 RX ADMIN — IPRATROPIUM BROMIDE AND ALBUTEROL SULFATE SCH ML: .5; 3 SOLUTION RESPIRATORY (INHALATION) at 03:00

## 2018-11-24 RX ADMIN — POLYETHYLENE GLYCOL 3350 SCH GM: 17 POWDER, FOR SOLUTION ORAL at 08:42

## 2018-11-24 RX ADMIN — IPRATROPIUM BROMIDE AND ALBUTEROL SULFATE SCH ML: .5; 3 SOLUTION RESPIRATORY (INHALATION) at 18:54

## 2018-11-24 RX ADMIN — INSULIN ASPART SCH UNITS: 100 INJECTION, SOLUTION INTRAVENOUS; SUBCUTANEOUS at 12:31

## 2018-11-24 RX ADMIN — IPRATROPIUM BROMIDE AND ALBUTEROL SULFATE SCH ML: .5; 3 SOLUTION RESPIRATORY (INHALATION) at 23:20

## 2018-11-24 NOTE — INFECTIOUS DISEASE PROG NOTE
Infectious Disease Subjective





- Review of Systems


Service Date: 18


Events since last encounter: 





Patient was very congested and her saturation was dropping. She went into 

respiratory failure and intubated orally, and put on ventilator day before 

yesterday.


Subjective: 





NO  fever.





Infectious Disease Objective





- Results


Result Diagrams: 


 18 04:35





 18 04:35


Recent Labs: 


 Laboratory Last Values











WBC  16.7 Th/cmm (4.8-10.8)  H  18  04:35    


 


RBC  3.09 Mil/cmm (3.80-5.20)  L  18  04:35    


 


Hgb  9.2 gm/dL (12-16)  L  18  04:35    


 


Hct  27.4 % (41.0-60)  L  18  04:35    


 


MCV  88.9 fl ()   18  04:35    


 


MCH  29.8 pg (27.0-31.0)   18  04:35    


 


MCHC Differential  33.5 pg (28.0-36.0)   18  04:35    


 


RDW  13.4 % (11.5-20.0)   18  04:35    


 


Plt Count  195 Th/cmm (150-400)   18  04:35    


 


MPV  9.1 fl  18  04:35    


 


Add Manual Diff  YES   18  06:10    


 


Neutrophils %  80.8 % (40.0-80.0)  H  18  04:35    


 


Band Neutrophils %  5 % (0-10)   18  04:45    


 


Lymphocytes %  11.4 % (20.0-50.0)  L  18  04:35    


 


Monocytes %  6.7 % (2.0-10.0)   18  04:35    


 


Eosinophils %  0.5 % (0.0-5.0)   18  04:35    


 


Basophils %  0.6 % (0.0-2.0)   18  04:35    


 


Neutrophils (Manual)  75 % (40-80)   18  06:10    


 


Lymphocytes  19 % (20-50)  L  18  06:10    


 


Monocytes  6 % (2-10)   18  06:10    


 


Eosinophils  3 % (0-5)   18  04:45    


 


Basophils  1 % (0-3)   18  04:45    


 


Platelet Estimate  ADEQUATE  (NORMAL)   18  06:10    


 


Anisocytosis  1+   18  06:10    


 


PT  10.6 SECONDS (9.5-11.5)   18  04:45    


 


INR  1.02  (0.5-1.4)   18  04:45    


 


PTT (Actin FS)  22.1 SECONDS (26.0-38.0)  L  18  19:40    


 


Specimen Source  Arterial   18  07:35    


 


Sample Site  RB   18  07:35    


 


pH  7.46  (7.35-7.45)  H  18  07:35    


 


pCO2  34.0 mmHg (35.0-45.0)  L  18  07:35    


 


pO2  62.0 mmHg (80.0-100.0)  L  18  07:35    


 


HCO3  25.5 mEq/L (20.0-26.0)   18  07:35    


 


Base Excess  0.8 mEq/L (-3.0-3.0)   18  07:35    


 


O2 Saturation  93.0 % (92.0-100.0)   18  07:35    


 


Boubacar Test  N/A   18  07:35    


 


Vent Rate  18   18  07:35    


 


Inspired O2  28   18  07:35    


 


Tidal Volume  450   18  07:35    


 


PEEP  5   18  07:35    


 


Pressure (ins/psv/peep)  N/A   18  07:35    


 


Critical Value  DM   18  07:35    


 


Sodium  146 mEq/L (136-145)  H  18  04:35    


 


Potassium  3.6 mEq/L (3.5-5.1)   18  04:35    


 


Chloride  116 mEq/L ()  H  18  04:35    


 


Carbon Dioxide  20.1 mEq/L (21.0-31.0)  L  18  04:35    


 


Anion Gap  13.5  (7.0-16.0)   18  04:35    


 


BUN  37 mg/dL (7-25)  H  18  04:35    


 


Creatinine  1.6 mg/dL (0.6-1.2)  H  18  04:35    


 


Est GFR ( Amer)  TNP   18  04:35    


 


Est GFR (Non-Af Amer)  TNP   18  04:35    


 


BUN/Creatinine Ratio  23.1   18  04:35    


 


Glucose  288 mg/dL ()  H  18  04:35    


 


POC Glucose  260 MG/DL (70 - 105)  H  18  11:49    


 


Whole Bld Lactic Acid  2.46 mmol/L (0.60-1.99)  H*  18  10:07    


 


Calcium  8.1 mg/dL (8.6-10.3)  L  18  04:35    


 


Phosphorus  2.8 mg/dL (2.5-5.0)   18  04:35    


 


Magnesium  2.2 mg/dL (1.9-2.7)   18  04:45    


 


Total Bilirubin  0.5 mg/dL (0.3-1.0)   18  04:05    


 


AST  35 U/L (13-39)   18  04:05    


 


ALT  13 U/L (7-52)   18  04:05    


 


Alkaline Phosphatase  63 U/L ()   18  04:05    


 


Total Protein  4.7 gm/dL (6.0-8.3)  L  18  04:05    


 


Albumin  2.5 gm/dL (3.7-5.3)  L  18  04:05    


 


Globulin  2.2 gm/dL  18  04:05    


 


Albumin/Globulin Ratio  1.1  (1.0-1.8)   18  04:05    


 


Triglycerides  126 mg/dL (<150)   18  04:10    


 


Cholesterol  124 mg/dL (<200)   18  04:10    


 


LDL Cholesterol Direct  84 mg/dL ()   18  04:10    


 


HDL Cholesterol  30 mg/dL (23-92)   18  04:10    


 


TSH  0.75 uIU/ml (0.34-5.60)   18  04:10    


 


Vancomycin Trough  19.0 ug/mL (5-10)  H  18  10:16    


 


Helicobacter pylori Ab  NEGATIVE  (NEGATIVE)   18  08:35    


 


Blood Type  O POSITIVE   18  19:40    


 


Antibody Screen  NEGATIVE   18  19:40    


 


Crossmatch  See Detail   18  19:40    














- Physical Exam


Vitals and I&O: 


 Vital Signs











Temp  98.2 F   18 14:00


 


Pulse  100   18 15:51


 


Resp  24   18 15:00


 


BP  161/67   18 15:00


 


Pulse Ox  99   18 15:35








 Intake & Output











 18





 18:59 06:59 18:59


 


Intake Total 946.496 6129.333 610


 


Output Total 400  250


 


Balance 622.023 6230.333 360


 


Weight (lbs) 68.294 kg  75.342 kg


 


Intake:   


 


  Intake, IV Amount 733.463 3289.333 100


 


    Azithromycin 500 mg In  250 





    Sodium Chloride 0.9% 250   





    ml @ 250 mls/hr IV Q24HR   





    PAT[facility]ICE Rx#:[id number]   


 


    D5-0.45NS 1,000 ml @ 50 184.167 963.333 





    mls/hr IV .Q20H [facility] Rx#:   





    [id number]   


 


    Pantoprazole 80 mg In 13.833  





    Sodium Chloride 0.9% 100   





    ml @ 10 mls/hr IV Q10H   





    PATRICE Rx#:[id number]   


 


    Piperacillin Sodium/ 100 100 100





    Tazobact 2.25 gm In   





    Sodium Chloride 0.9% 50   





    ml @ 100 mls/hr IV Q6H   





    PATRICE Rx#:[id number]   


 


    Vancomycin HCl 1 gm In 250  





    Sodium Chloride 0.9% 250   





    ml @ 165 mls/hr IV Q24H   





    PATRICE Rx#:[id number]   


 


  Tube Feeding 160  360


 


  Other 250  150


 


Output:   


 


  Urine 400  250


 


Other:   


 


  # Bowel Movements 1  0


 


  Stool Characteristics Soft  





 Liquid  


 


  Weight Source Bedscale  Bedscale











Active Medications: 


Current Medications





Acetaminophen (Tylenol)  650 mg PO Q4HR PRN


   PRN Reason: MILD PAIN 1-3


   Stop: 19 17:58


Acetaminophen (Tylenol)  650 mg PO Q4HR PRN


   PRN Reason: TEMP >100.4


   Stop: 19 17:58


Acetylcysteine (Mucomyst 20%)  3 ml HHN Q4HRT [facility]


   Stop: 19 14:59


   Last Admin: 18 15:35 Dose:  3 ml


Albuterol/Ipratropium (Duoneb Neb)  3 ml HHN Q4HRT [facility]


   Stop: 19 14:59


   Last Admin: 18 15:35 Dose:  3 ml


Amlodipine Besylate (Norvasc)  5 mg PO DAILY [facility]


   Stop: 19 08:59


   Last Admin: 18 08:42 Dose:  5 mg


Ascorbic Acid (Vitamin C)  500 mg PO DAILY [facility]


   Stop: 19 08:59


   Last Admin: 18 08:42 Dose:  500 mg


Bisacodyl (Dulcolax 10 Mg Supp)  10 mg RC DAILY PRN


   PRN Reason: IF MOM INEFFECTIVE


   Stop: 19 17:58


Chlorhexidine Gluconate (Peridex)  15 ml MM 0800,2000 [facility]


   Stop: 19 19:59


   Last Admin: 18 08:43 Dose:  15 ml


Azithromycin 500 mg/ Sodium (Chloride)  250 mls @ 250 mls/hr IV Q24HR [facility]


   Stop: 19 22:29


   Last Infusion: 18 23:45 Dose:  Infused


Dextrose/Sodium Chloride (D5-0.45ns)  1,000 mls @ 50 mls/hr IV .Q20H [facility]


   Stop: 19 17:29


   Last Admin: 18 05:41 Dose:  50 mls/hr


Diltiazem HCl 125 mg/ Dextrose  100 mls @ 0 mls/hr IV Q8H PRN; Protocol


   PRN Reason: blood pressure


   Stop: 19 07:48


   Last Admin: 18 09:00 Dose:  6.25 mg/hr, 5 mls/hr


Piperacillin Sod/Tazobactam (Sod 2.25 gm/ Sodium Chloride)  50 mls @ 100 mls/hr 

IV Q6H [facility]


   Stop: 19 13:59


   Last Infusion: 18 14:30 Dose:  Infused


Vancomycin HCl 0.75 gm/ Sodium (Chloride)  250 mls @ 165 mls/hr IV Q24H [facility]


   Stop: 19 09:59


Insulin Aspart (Novolog Insulin Sliding Scale)  0 units SUBQ Q6HR [facility]; Protocol


   Stop: 19 01:14


   Last Admin: 18 12:31 Dose:  7 units


Lactobacillus Rhamnosus (Culturelle 15b)  1 each PO DAILY [facility]


   Stop: 19 08:59


   Last Admin: 18 08:42 Dose:  1 each


Lorazepam (Ativan)  1 mg IVP Q4HR PRN; Protocol


   PRN Reason: Agitation


   Stop: 19 07:12


   Last Admin: 18 08:29 Dose:  1 mg


Miscellaneous (Probiotic Screen)  1 ea  PRN PRN


   PRN Reason: PROTOCOL


   Stop: 19 15:29


Miscellaneous (Vancomycin Iv Per Pharmacy)  1 ea  PRN [facility]


   Stop: 19 23:44


Miscellaneous (Zosyn Iv Per Pharmacy)  1 ea  PRN PRN


   PRN Reason: PROTOCOL


   Stop: 19 12:03


Morphine Sulfate (Morphine)  1 mg IVP Q4HR PRN


   PRN Reason: Pain (Moderate)


   Stop: 19 10:04


   Last Admin: 18 10:58 Dose:  1 mg


Mupirocin (Bactroban Oint)  1 appl NS BID [facility]


   Stop: 18 17:01


   Last Admin: 18 09:00 Dose:  1 appl


Pantoprazole Sodium (Protonix)  40 mg IVP BID [facility]


   Stop: 19 08:59


   Last Admin: 18 08:42 Dose:  40 mg


Polyethylene Glycol (Miralax)  17 gm PO BID [facility]


   Stop: 19 08:59


   Last Admin: 18 08:42 Dose:  17 gm








General: no acute distress, well developed, well nourished, other (Intubated 

orally. on the ventilator support.)


HEENT: atraumatic, normocephalic, PERRLA, EOMI


Neck: supple, no thyromegaly


Cardiovascular: S1S2, regular


Lungs: clear to auscultation bilaterally, clear to percussion


Abdomen: soft, no tender, no distended


Extremities: no cyanosis, no clubbing, no edema


Neurological: other (confused)


Skin: intact





Infectious Disease Assmt/Plan





- Problem List


Patient Problems: 


All Active Problems





COFFEE GROUND EMESIS (Acute) 











- Assessment


Assessment: 





1.  Severe sepsis.


2.  Pneumonia.


3.  Fecal impaction.


4.  Gallbladder stone at bladder neck.


5.  Diabetes mellitus type 2.


6.  Hypertension.


7.  Dementia.


8.  Anemia.


9.  Large hiatal hernia.


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 


11. GI bleed 2/2 PUD.


12. SVT


12. VDRF.








- Plan


Plan: 





Continue zosyn and  dc zithromax.


Continue vancomycin IV


Bactroban nasal ointment.





Nutritional Asmnt/Malnutr-PDOC





- Dietary Evaluation


Malnutrition Findings (Please click <Entered> for more info): 








Nutritional Asmnt/Malnutrition                             Start:  18 15:

21


Text:                                                      Status: Complete    

  


Freq:                                                                          

  


Protocol:                                                                      

  


 Document     18 15:22  NICOLEFORREST  (Rec: 18 15:44  [id number])


 Nutritional Asmnt/Malnutrition


     Patient General Information


      Nutritional Screening                      High Risk


      Diagnosis                                  sepsis, dehydration, pneumonia


      Pertinent Medical Hx/Surgical Hx           HTN, DM, dyslipidemia,


                                                 dementia, anemia, muscle


                                                 weakness


      Subjective Information                     Pt receiving care from RN at


                                                 time of visit. Per nurse notes


                                                 , pt had coffee ground emesis,


                                                 NGT was unable to be inserted


                                                 d/t large retro cardiac


                                                 hiatal hernia, and will have


                                                 EGD tomorrow. Spoke w/ LEVON Lebron by phone who verfied


                                                 the nurse notes and states pt


                                                 will continue to be NPO.


      Current Diet Order/ Nutrition Support      NPO


      Pertinent Medications                      D5-0.45ns, novolog, culturelle


                                                 , pantoprazole


      Pertinent Labs                             : glucose 392, Na 144, Cl


                                                 110, BUN 78, Alb 2.9, 


                                                 -423


                                                 : glucose 547, Na 134, Cl


                                                 97, BUN 69, Alb 3.0, -


                                                 485


     Nutritional Hx/Data


      Height                                     1.63 m


      Height (Calculated Centimeters)            162.6


      Current Weight (lbs)                       64.682 kg


      Weight (Calculated Kilograms)              64.7


      Weight (Calculated Grams)                  09040.3


      Ideal Body Weight                          120 lb


      Body Mass Index (BMI)                      24.5


      Weight Status                              Approriate


     GI Symptoms


      GI Symptoms                                Nausea


                                                 Vomitting


      Last BM                                    none noted


      Difficult in:                              None


      Food Allergies                             No


      Skin Integrity/Comment:                    intact, angelica 13


      Current %PO                                Negligible < 25%


     Estimated Nutritional Goals


      BEE in Kcals:                              Using Current wt


      Calories/Kcals/Kg                          30-35


      Kcals Calculated                           [id number]


      Protein:                                   Using Current wt


      Protein g/k.2-1.5


      Protein Calculated                         78-97 g


      Fluid: ml                                  per MD


     Nutritional Problem


      2. Problem


       Problem                                   Altered nutrition related lab


                                                 values


       Etiology                                  dehydration, endocrine


                                                 dysfunction


       Signs/Symptoms:                           Cl 110, BUN 78, glucose 392,


                                                 -423


      1. Problem


       Problem                                   Increased nutrient needs


       Etiology                                  metabolic stress


       Signs/Symptoms:                           dx of sepsis and pneumonia


     Malnutrition Related to Morbid Obesity


      Malnutrition related to morbid obesity     No


     Intervention/Recommendation


      Comments                                   1. Monitor NPO status and


                                                 advance diet when medically


                                                 appropriate


                                                 2. Consider alternative


                                                 nutrition route if pt unable


                                                 to tolerate PO intake


                                                 3. Monitor wt, nutrition


                                                 related labs, and skin


                                                 integrity


                                                 4. F/U as high risk in 2-3


                                                 days, -


     Expected Outcomes/Goals


      Expected Outcomes/Goals                    1. Pt to start oral intake or


                                                 alternate nutrition route if


                                                 necessary


                                                 2. Wt stability, skin to


                                                 remain intact, nutrition


                                                 related labs to approach


                                                 normal limits


                                                 Reveiwed by Lisa Haas RD

## 2018-11-24 NOTE — PROGRESS NOTES
DATE:  11/24/2018



SUBJECTIVE:  The patient was seen in ICU.  The patient is currently orally

intubated.  The patient is asleep, easily arousable.  Otherwise, the patient

appears to be in no acute distress.



OBJECTIVE:

VITAL SIGNS:  Temperature 97.3, heart rate of 93, blood pressure 122/56, 97%

oxygen saturation, respiration of 20.

HEENT:  Head is atraumatic and normocephalic.  Eyes:  Bilateral conjunctivae are

clear, but pupils are equally round and reactive.

NECK:  Supple.  No JVD.

CARDIOVASCULAR:  S1 and S2, without murmur.

PULMONARY:  Decreased breath sounds with fine scattered rhonchi.

GASTROINTESTINAL:  Soft and nontender without guarding.  Hypoactive bowel

sounds.

MUSCULOSKELETAL:  No clubbing.  No cyanosis.  Positive muscle weakness.



ASSESSMENT:

1.  Sepsis.

2.  Pneumonia.

3.  Hiatal hernia.

4.  Status post EGD.



PLAN:  We will continue current antibiotics.  We will try to wean off patient

from ventilator.  The patient may be a good candidate for G-tube insertion.

Treatment plans were discussed with the patient's nurse.  Treatment plans were

discussed with Dr. Ivey.





DD: 11/24/2018 09:29

DT: 11/24/2018 16:01

JOB# [id number]  [id number]

## 2018-11-24 NOTE — DIAGNOSTIC IMAGING REPORT
Exam: KUB of the abdomen



HISTORY constipation



Findings:



2 views of the abdomen reviewed.  The study demonstrates nonspecific

bowel gas pattern.  There is evidence for fecal impaction in the

rectum..  Severe deformity of the lower lumbar spine appreciated.  NG

tube is in stomach.



IMPRESSION: Nonspecific bowel gas pattern.



Large amount of fecal content in the rectum.

## 2018-11-24 NOTE — GI PROGRESS NOTE
Subjective





- Review of Systems


Service Date: 11/24/18


Subjective: 


No new events, had BM yesterday with enema





Objective





- Results


Result Diagrams: 


 11/24/18 04:35





 11/24/18 04:35


Recent Labs: 


 Laboratory Last Values











WBC  16.7 Th/cmm (4.8-10.8)  H  11/24/18  04:35    


 


RBC  3.09 Mil/cmm (3.80-5.20)  L  11/24/18  04:35    


 


Hgb  9.2 gm/dL (12-16)  L  11/24/18  04:35    


 


Hct  27.4 % (41.0-60)  L  11/24/18  04:35    


 


MCV  88.9 fl ()   11/24/18  04:35    


 


MCH  29.8 pg (27.0-31.0)   11/24/18  04:35    


 


MCHC Differential  33.5 pg (28.0-36.0)   11/24/18  04:35    


 


RDW  13.4 % (11.5-20.0)   11/24/18  04:35    


 


Plt Count  195 Th/cmm (150-400)   11/24/18  04:35    


 


MPV  9.1 fl  11/24/18  04:35    


 


Add Manual Diff  YES   11/22/18  06:10    


 


Neutrophils %  80.8 % (40.0-80.0)  H  11/24/18  04:35    


 


Band Neutrophils %  5 % (0-10)   11/21/18  04:45    


 


Lymphocytes %  11.4 % (20.0-50.0)  L  11/24/18  04:35    


 


Monocytes %  6.7 % (2.0-10.0)   11/24/18  04:35    


 


Eosinophils %  0.5 % (0.0-5.0)   11/24/18  04:35    


 


Basophils %  0.6 % (0.0-2.0)   11/24/18  04:35    


 


Neutrophils (Manual)  75 % (40-80)   11/22/18  06:10    


 


Lymphocytes  19 % (20-50)  L  11/22/18  06:10    


 


Monocytes  6 % (2-10)   11/22/18  06:10    


 


Eosinophils  3 % (0-5)   11/21/18  04:45    


 


Basophils  1 % (0-3)   11/21/18  04:45    


 


Platelet Estimate  ADEQUATE  (NORMAL)   11/22/18  06:10    


 


Anisocytosis  1+   11/22/18  06:10    


 


PT  10.6 SECONDS (9.5-11.5)   11/21/18  04:45    


 


INR  1.02  (0.5-1.4)   11/21/18  04:45    


 


PTT (Actin FS)  22.1 SECONDS (26.0-38.0)  L  11/19/18  19:40    


 


Specimen Source  Arterial   11/22/18  17:39    


 


Sample Site  Right Radial   11/22/18  17:39    


 


pH  7.36  (7.35-7.45)   11/22/18  17:39    


 


pCO2  41.0 mmHg (35.0-45.0)   11/22/18  17:39    


 


pO2  227.0 mmHg (80.0-100.0)  H  11/22/18  17:39    


 


HCO3  23.3 mEq/L (20.0-26.0)   11/22/18  17:39    


 


Base Excess  -2.2 mEq/L (-3.0-3.0)   11/22/18  17:39    


 


O2 Saturation  100.0 % (92.0-100.0)   11/22/18  17:39    


 


Boubacar Test  Positive   11/22/18  17:39    


 


Vent Rate  18   11/22/18  17:39    


 


Inspired O2  100   11/22/18  17:39    


 


Tidal Volume  450   11/22/18  17:39    


 


PEEP  5   11/22/18  17:39    


 


Pressure (ins/psv/peep)  NA   11/22/18  17:39    


 


Critical Value  SC   11/22/18  17:39    


 


Sodium  146 mEq/L (136-145)  H  11/24/18  04:35    


 


Potassium  3.6 mEq/L (3.5-5.1)   11/24/18  04:35    


 


Chloride  116 mEq/L ()  H  11/24/18  04:35    


 


Carbon Dioxide  20.1 mEq/L (21.0-31.0)  L  11/24/18  04:35    


 


Anion Gap  13.5  (7.0-16.0)   11/24/18  04:35    


 


BUN  37 mg/dL (7-25)  H  11/24/18  04:35    


 


Creatinine  1.6 mg/dL (0.6-1.2)  H  11/24/18  04:35    


 


Est GFR ( Amer)  TNP   11/24/18  04:35    


 


Est GFR (Non-Af Amer)  TNP   11/24/18  04:35    


 


BUN/Creatinine Ratio  23.1   11/24/18  04:35    


 


Glucose  288 mg/dL ()  H  11/24/18  04:35    


 


POC Glucose  269 MG/DL (70 - 105)  H  11/24/18  06:38    


 


Whole Bld Lactic Acid  2.46 mmol/L (0.60-1.99)  H*  11/20/18  10:07    


 


Calcium  8.1 mg/dL (8.6-10.3)  L  11/24/18  04:35    


 


Phosphorus  2.8 mg/dL (2.5-5.0)   11/24/18  04:35    


 


Magnesium  2.2 mg/dL (1.9-2.7)   11/21/18  04:45    


 


Total Bilirubin  0.5 mg/dL (0.3-1.0)   11/23/18  04:05    


 


AST  35 U/L (13-39)   11/23/18  04:05    


 


ALT  13 U/L (7-52)   11/23/18  04:05    


 


Alkaline Phosphatase  63 U/L ()   11/23/18  04:05    


 


Total Protein  4.7 gm/dL (6.0-8.3)  L  11/23/18  04:05    


 


Albumin  2.5 gm/dL (3.7-5.3)  L  11/23/18  04:05    


 


Globulin  2.2 gm/dL  11/23/18  04:05    


 


Albumin/Globulin Ratio  1.1  (1.0-1.8)   11/23/18  04:05    


 


Triglycerides  126 mg/dL (<150)   11/20/18  04:10    


 


Cholesterol  124 mg/dL (<200)   11/20/18  04:10    


 


LDL Cholesterol Direct  84 mg/dL ()   11/20/18  04:10    


 


HDL Cholesterol  30 mg/dL (23-92)   11/20/18  04:10    


 


TSH  0.75 uIU/ml (0.34-5.60)   11/20/18  04:10    


 


Helicobacter pylori Ab  NEGATIVE  (NEGATIVE)   11/21/18  08:35    


 


Blood Type  O POSITIVE   11/19/18  19:40    


 


Antibody Screen  NEGATIVE   11/19/18  19:40    


 


Crossmatch  See Detail   11/19/18  19:40    














- Physical Exam


Vitals and I&O: 


 Vital Signs











Temp  98.4 F   11/24/18 06:00


 


Pulse  91   11/24/18 07:09


 


Resp  22   11/24/18 06:00


 


BP  161/69   11/24/18 06:00


 


Pulse Ox  98   11/24/18 07:09








 Intake & Output











 11/23/18 11/24/18 11/24/18





 18:59 06:59 18:59


 


Intake Total 203.640 9645.333 510


 


Output Total 400  250


 


Balance 025.776 2123.333 260


 


Weight (lbs) 68.294 kg  75.342 kg


 


Intake:   


 


  Intake, IV Amount 516.799 6931.333 


 


    Azithromycin 500 mg In  250 





    Sodium Chloride 0.9% 250   





    ml @ 250 mls/hr IV Q24HR   





    [facility] Rx#:[id number]   


 


    D5-0.45NS 1,000 ml @ 50 184.167 963.333 





    mls/hr IV .Q20H [facility] Rx#:   





    [id number]   


 


    Pantoprazole 80 mg In 13.833  





    Sodium Chloride 0.9% 100   





    ml @ 10 mls/hr IV Q10H   





    [facility] Rx#:[id number]   


 


    Piperacillin Sodium/ 100 50 





    Tazobact 2.25 gm In   





    Sodium Chloride 0.9% 50   





    ml @ 100 mls/hr IV Q6H   





    [facility] Rx#:[id number]   


 


    Vancomycin HCl 1 gm In 250  





    Sodium Chloride 0.9% 250   





    ml @ 165 mls/hr IV Q24H   





    [facility] Rx#:[id number]   


 


  Tube Feeding 160  360


 


  Other 250  150


 


Output:   


 


  Urine 400  250


 


Other:   


 


  # Bowel Movements 1  0


 


  Stool Characteristics Soft  





 Liquid  


 


  Weight Source Bedscale  Bedscale











Active Medications: 


Current Medications





Acetaminophen (Tylenol)  650 mg PO Q4HR PRN


   PRN Reason: MILD PAIN 1-3


   Stop: 01/19/19 17:58


Acetaminophen (Tylenol)  650 mg PO Q4HR PRN


   PRN Reason: TEMP >100.4


   Stop: 01/19/19 17:58


Acetylcysteine (Mucomyst 20%)  3 ml HHN Q4HRT [facility]


   Stop: 01/21/19 14:59


   Last Admin: 11/24/18 07:09 Dose:  3 ml


Albuterol/Ipratropium (Duoneb Neb)  3 ml HHN Q4HRT [facility]


   Stop: 01/20/19 14:59


   Last Admin: 11/24/18 07:09 Dose:  3 ml


Amlodipine Besylate (Norvasc)  5 mg PO DAILY [facility]


   Stop: 01/20/19 08:59


   Last Admin: 11/23/18 08:11 Dose:  5 mg


Ascorbic Acid (Vitamin C)  500 mg PO DAILY [facility]


   Stop: 01/20/19 08:59


   Last Admin: 11/23/18 08:11 Dose:  500 mg


Bisacodyl (Dulcolax 10 Mg Supp)  10 mg RC DAILY PRN


   PRN Reason: IF MOM INEFFECTIVE


   Stop: 01/19/19 17:58


Chlorhexidine Gluconate (Peridex)  15 ml MM 0800,2000 [facility]


   Stop: 01/21/19 19:59


   Last Admin: 11/23/18 20:39 Dose:  15 ml


Azithromycin 500 mg/ Sodium (Chloride)  250 mls @ 250 mls/hr IV Q24HR [facility]


   Stop: 01/18/19 22:29


   Last Infusion: 11/23/18 23:45 Dose:  Infused


Dextrose/Sodium Chloride (D5-0.45ns)  1,000 mls @ 50 mls/hr IV .Q20H [facility]


   Stop: 01/20/19 17:29


   Last Admin: 11/24/18 05:41 Dose:  50 mls/hr


Diltiazem HCl 125 mg/ Dextrose  100 mls @ 0 mls/hr IV Q8H PRN; Protocol


   PRN Reason: blood pressure


   Stop: 01/21/19 07:48


   Last Admin: 11/22/18 09:00 Dose:  6.25 mg/hr, 5 mls/hr


Vancomycin HCl 1 gm/ Sodium (Chloride)  250 mls @ 165 mls/hr IV Q24H [facility]


   Stop: 01/21/19 09:59


   Last Infusion: 11/23/18 12:00 Dose:  Infused


Piperacillin Sod/Tazobactam (Sod 2.25 gm/ Sodium Chloride)  50 mls @ 100 mls/hr 

IV Q6H [facility]


   Stop: 01/21/19 13:59


   Last Admin: 11/24/18 02:10 Dose:  100 mls/hr


Insulin Aspart (Novolog Insulin Sliding Scale)  0 units SUBQ Q6HR [facility]; Protocol


   Stop: 01/19/19 01:14


   Last Admin: 11/24/18 06:48 Dose:  7 units


Lactobacillus Rhamnosus (Culturelle 15b)  1 each PO DAILY [facility]


   Stop: 01/20/19 08:59


   Last Admin: 11/23/18 08:11 Dose:  1 each


Lorazepam (Ativan)  1 mg IVP Q4HR PRN; Protocol


   PRN Reason: Agitation


   Stop: 01/21/19 07:12


   Last Admin: 11/23/18 08:29 Dose:  1 mg


Miscellaneous (Probiotic Screen)  1 ea  PRN PRN


   PRN Reason: PROTOCOL


   Stop: 01/19/19 15:29


Miscellaneous (Vancomycin Iv Per Pharmacy)  1 ea MC PRN [facility]


   Stop: 01/20/19 23:44


Miscellaneous (Zosyn Iv Per Pharmacy)  1 ea  PRN PRN


   PRN Reason: PROTOCOL


   Stop: 01/21/19 12:03


Morphine Sulfate (Morphine)  1 mg IVP Q4HR PRN


   PRN Reason: Pain (Moderate)


   Stop: 01/19/19 10:04


   Last Admin: 11/23/18 10:58 Dose:  1 mg


Mupirocin (Bactroban Oint)  1 appl NS BID [facility]


   Stop: 11/26/18 17:01


   Last Admin: 11/23/18 16:22 Dose:  1 appl


Pantoprazole Sodium (Protonix)  40 mg IVP BID [facility]


   Stop: 01/22/19 08:59


   Last Admin: 11/23/18 16:20 Dose:  40 mg


Polyethylene Glycol (Miralax)  17 gm PO BID [facility]


   Stop: 01/22/19 08:59


   Last Admin: 11/23/18 16:20 Dose:  17 gm








General: Alert


HEENT: Atraumatic


Neck: Supple


Cardiovascular: no Regular rate


Abdomen: Bowel sounds, Soft, no Tender, no Hepatomegaly, no Splenomegaly, no 

Distended, no Rebound





Assessment/Plan





- Problem List


Patient Problems: 


All Active Problems





COFFEE GROUND EMESIS (Acute) 











- Assessment


Assessment: 


# Coffee ground emesis


# hiatal hernia


# Pneumonia


# Severe sepsis


# Fecal impaction





EGD on 11/21 showed 7cm hiatal hernia, with evidence of mucosal tear there from 

previous NG tube insertion attempts. 2 clips placed to this area. With some 

difficulty, an NG tube was endoscopically placed into the stomach. No stomach 

ulcer or mass. 





Now intubated, but no longer having GI bleed. Fecal impaction noted on KUB.








Plan:





- start NG tube feeding with glucerna, titrate up to goal slowly


- change ppi drip to bid


- tap water enema q 12, and miralax bid po


- cont IVF


- cont Abx


- can consider G tube insertion in the future. Can do this next week while she 

is intubated


- FREDISB

## 2018-11-24 NOTE — DIAGNOSTIC IMAGING REPORT
Exam: Chest x-ray



HISTORY shortness of breath.



COMPARISON: 11/23 2018



Findings:



Frontal examination of the chest was reviewed compared to prior study

the early demonstrates left lower lobe pneumonia.  The costophrenic

angles are clear bony thorax intact.  Endotracheal tube NG tube

unchanged position.  The aortic arch calcified.  Bony thorax is intact.



IMPRESSION: Mild left basilar infiltrate.

## 2018-11-25 LAB
ALBUMIN SERPL-MCNC: 2.3 GM/DL (ref 3.7–5.3)
ALBUMIN/GLOB SERPL: 1.1 {RATIO} (ref 1–1.8)
ALP SERPL-CCNC: 70 U/L (ref 34–104)
ALT SERPL-CCNC: 9 U/L (ref 7–52)
ANION GAP SERPL CALC-SCNC: 12.2 MMOL/L (ref 7–16)
AST SERPL-CCNC: 11 U/L (ref 13–39)
BASOPHILS # BLD AUTO: 0.1 TH/CUMM (ref 0–0.2)
BASOPHILS NFR BLD AUTO: 0.8 % (ref 0–2)
BILIRUB SERPL-MCNC: 0.4 MG/DL (ref 0.3–1)
BUN SERPL-MCNC: 37 MG/DL (ref 7–25)
CALCIUM SERPL-MCNC: 7.7 MG/DL (ref 8.6–10.3)
CHLORIDE SERPL-SCNC: 116 MEQ/L (ref 98–107)
CO2 SERPL-SCNC: 20.9 MEQ/L (ref 21–31)
CREAT SERPL-MCNC: 1.6 MG/DL (ref 0.6–1.2)
EOSINOPHIL # BLD AUTO: 0.2 TH/CMM (ref 0.1–0.4)
EOSINOPHIL NFR BLD AUTO: 1.1 % (ref 0–5)
ERYTHROCYTE [DISTWIDTH] IN BLOOD BY AUTOMATED COUNT: 13.3 % (ref 11.5–20)
GLOBULIN SER-MCNC: 2.1 GM/DL
GLUCOSE SERPL-MCNC: 329 MG/DL (ref 70–105)
HCT VFR BLD CALC: 25.8 % (ref 41–60)
HGB BLD-MCNC: 8.5 GM/DL (ref 12–16)
LYMPHOCYTE AB SER FC-ACNC: 1.9 TH/CMM (ref 1.5–3)
LYMPHOCYTES NFR BLD AUTO: 12.2 % (ref 20–50)
MCH RBC QN AUTO: 28.8 PG (ref 27–31)
MCHC RBC AUTO-ENTMCNC: 32.9 PG (ref 28–36)
MCV RBC AUTO: 87.6 FL (ref 81–100)
MONOCYTES # BLD AUTO: 0.7 TH/CMM (ref 0.3–1)
MONOCYTES NFR BLD AUTO: 4.7 % (ref 2–10)
NEUTROPHILS # BLD: 12.3 TH/CMM (ref 1.8–8)
NEUTROPHILS NFR BLD AUTO: 81.2 % (ref 40–80)
PLATELET # BLD: 170 TH/CMM (ref 150–400)
PMV BLD AUTO: 8.9 FL
POTASSIUM SERPL-SCNC: 4.1 MEQ/L (ref 3.5–5.1)
RBC # BLD AUTO: 2.94 MIL/CMM (ref 3.8–5.2)
SODIUM SERPL-SCNC: 145 MEQ/L (ref 136–145)
WBC # BLD AUTO: 15.2 TH/CMM (ref 4.8–10.8)

## 2018-11-25 RX ADMIN — MORPHINE SULFATE PRN MG: 2 INJECTION, SOLUTION INTRAMUSCULAR; INTRAVENOUS at 23:56

## 2018-11-25 RX ADMIN — INSULIN ASPART SCH UNITS: 100 INJECTION, SOLUTION INTRAVENOUS; SUBCUTANEOUS at 12:29

## 2018-11-25 RX ADMIN — IPRATROPIUM BROMIDE AND ALBUTEROL SULFATE SCH ML: .5; 3 SOLUTION RESPIRATORY (INHALATION) at 02:16

## 2018-11-25 RX ADMIN — CHLORHEXIDINE GLUCONATE SCH ML: 1.2 RINSE ORAL at 20:07

## 2018-11-25 RX ADMIN — Medication SCH EACH: at 08:28

## 2018-11-25 RX ADMIN — INSULIN ASPART SCH UNITS: 100 INJECTION, SOLUTION INTRAVENOUS; SUBCUTANEOUS at 17:02

## 2018-11-25 RX ADMIN — SODIUM CHLORIDE SCH MLS/HR: 9 INJECTION, SOLUTION INTRAVENOUS at 08:27

## 2018-11-25 RX ADMIN — IPRATROPIUM BROMIDE AND ALBUTEROL SULFATE SCH ML: .5; 3 SOLUTION RESPIRATORY (INHALATION) at 19:07

## 2018-11-25 RX ADMIN — CHLORHEXIDINE GLUCONATE SCH ML: 1.2 RINSE ORAL at 08:39

## 2018-11-25 RX ADMIN — INSULIN ASPART SCH UNITS: 100 INJECTION, SOLUTION INTRAVENOUS; SUBCUTANEOUS at 00:14

## 2018-11-25 RX ADMIN — IPRATROPIUM BROMIDE AND ALBUTEROL SULFATE SCH ML: .5; 3 SOLUTION RESPIRATORY (INHALATION) at 23:01

## 2018-11-25 RX ADMIN — IPRATROPIUM BROMIDE AND ALBUTEROL SULFATE SCH ML: .5; 3 SOLUTION RESPIRATORY (INHALATION) at 06:57

## 2018-11-25 RX ADMIN — DEXTROSE AND SODIUM CHLORIDE SCH MLS/HR: 5; .45 INJECTION, SOLUTION INTRAVENOUS at 06:14

## 2018-11-25 RX ADMIN — INSULIN ASPART SCH UNITS: 100 INJECTION, SOLUTION INTRAVENOUS; SUBCUTANEOUS at 06:13

## 2018-11-25 RX ADMIN — INSULIN ASPART SCH UNITS: 100 INJECTION, SOLUTION INTRAVENOUS; SUBCUTANEOUS at 23:53

## 2018-11-25 RX ADMIN — SODIUM CHLORIDE SCH MLS/HR: 9 INJECTION, SOLUTION INTRAVENOUS at 01:59

## 2018-11-25 RX ADMIN — POLYETHYLENE GLYCOL 3350 SCH GM: 17 POWDER, FOR SOLUTION ORAL at 17:03

## 2018-11-25 RX ADMIN — SODIUM CHLORIDE SCH MLS/HR: 9 INJECTION, SOLUTION INTRAVENOUS at 14:38

## 2018-11-25 RX ADMIN — POLYETHYLENE GLYCOL 3350 SCH GM: 17 POWDER, FOR SOLUTION ORAL at 08:28

## 2018-11-25 RX ADMIN — IPRATROPIUM BROMIDE AND ALBUTEROL SULFATE SCH ML: .5; 3 SOLUTION RESPIRATORY (INHALATION) at 15:29

## 2018-11-25 RX ADMIN — SODIUM CHLORIDE SCH MLS/HR: 9 INJECTION, SOLUTION INTRAVENOUS at 20:00

## 2018-11-25 RX ADMIN — IPRATROPIUM BROMIDE AND ALBUTEROL SULFATE SCH ML: .5; 3 SOLUTION RESPIRATORY (INHALATION) at 11:30

## 2018-11-25 NOTE — INTERNAL MEDICINE PROG NOTE
Internal Medicine Subjective





- Subjective


Patient seen and examined:: chart reviewed


Patient is:: other (weak , in no distress ,as per gi no ative bleeding)


Per staff patient has:: no adverse event, tolerating meds





Internal Medicine Objective





- Results


Result Diagrams: 


 18 07:42





 18 07:42


Recent Labs: 


 Laboratory Last Values











WBC  15.2 Th/cmm (4.8-10.8)  H  18  07:42    


 


RBC  2.94 Mil/cmm (3.80-5.20)  L  18  07:42    


 


Hgb  8.5 gm/dL (12-16)  L  18  07:42    


 


Hct  25.8 % (41.0-60)  L  18  07:42    


 


MCV  87.6 fl ()   18  07:42    


 


MCH  28.8 pg (27.0-31.0)   18  07:42    


 


MCHC Differential  32.9 pg (28.0-36.0)   18  07:42    


 


RDW  13.3 % (11.5-20.0)   18  07:42    


 


Plt Count  170 Th/cmm (150-400)   18  07:42    


 


MPV  8.9 fl  18  07:42    


 


Add Manual Diff  YES   18  06:10    


 


Neutrophils %  81.2 % (40.0-80.0)  H  18  07:42    


 


Band Neutrophils %  5 % (0-10)   18  04:45    


 


Lymphocytes %  12.2 % (20.0-50.0)  L  18  07:42    


 


Monocytes %  4.7 % (2.0-10.0)   18  07:42    


 


Eosinophils %  1.1 % (0.0-5.0)   18  07:42    


 


Basophils %  0.8 % (0.0-2.0)   18  07:42    


 


Neutrophils (Manual)  75 % (40-80)   18  06:10    


 


Lymphocytes  19 % (20-50)  L  18  06:10    


 


Monocytes  6 % (2-10)   18  06:10    


 


Eosinophils  3 % (0-5)   18  04:45    


 


Basophils  1 % (0-3)   18  04:45    


 


Platelet Estimate  ADEQUATE  (NORMAL)   18  06:10    


 


Anisocytosis  1+   18  06:10    


 


PT  10.6 SECONDS (9.5-11.5)   18  04:45    


 


INR  1.02  (0.5-1.4)   18  04:45    


 


PTT (Actin FS)  22.1 SECONDS (26.0-38.0)  L  18  19:40    


 


Specimen Source  Arterial   18  07:35    


 


Sample Site  RB   18  07:35    


 


pH  7.46  (7.35-7.45)  H  18  07:35    


 


pCO2  34.0 mmHg (35.0-45.0)  L  18  07:35    


 


pO2  62.0 mmHg (80.0-100.0)  L  18  07:35    


 


HCO3  25.5 mEq/L (20.0-26.0)   18  07:35    


 


Base Excess  0.8 mEq/L (-3.0-3.0)   18  07:35    


 


O2 Saturation  93.0 % (92.0-100.0)   18  07:35    


 


Boubacar Test  N/A   18  07:35    


 


Vent Rate  18   18  07:35    


 


Inspired O2  28   18  07:35    


 


Tidal Volume  450   18  07:35    


 


PEEP  5   18  07:35    


 


Pressure (ins/psv/peep)  N/A   18  07:35    


 


Critical Value  DM   18  07:35    


 


Sodium  145 mEq/L (136-145)   18  07:42    


 


Potassium  4.1 mEq/L (3.5-5.1)   18  07:42    


 


Chloride  116 mEq/L ()  H  18  07:42    


 


Carbon Dioxide  20.9 mEq/L (21.0-31.0)  L  18  07:42    


 


Anion Gap  12.2  (7.0-16.0)   18  07:42    


 


BUN  37 mg/dL (7-25)  H  18  07:42    


 


Creatinine  1.6 mg/dL (0.6-1.2)  H  18  07:42    


 


Est GFR ( Amer)  TNP   18  07:42    


 


Est GFR (Non-Af Amer)  TNP   18  07:42    


 


BUN/Creatinine Ratio  23.1   18  07:42    


 


Glucose  329 mg/dL ()  H  18  07:42    


 


POC Glucose  311 MG/DL (70 - 105)  H  18  06:06    


 


Whole Bld Lactic Acid  2.46 mmol/L (0.60-1.99)  H*  18  10:07    


 


Calcium  7.7 mg/dL (8.6-10.3)  L  18  07:42    


 


Phosphorus  2.8 mg/dL (2.5-5.0)   18  04:35    


 


Magnesium  2.2 mg/dL (1.9-2.7)   18  04:45    


 


Total Bilirubin  0.4 mg/dL (0.3-1.0)   18  07:42    


 


AST  11 U/L (13-39)  L  18  07:42    


 


ALT  9 U/L (7-52)   18  07:42    


 


Alkaline Phosphatase  70 U/L ()   18  07:42    


 


B-Natriuretic Peptide  1250.0 pg/mL (5.0-100.0)  H  18  07:42    


 


Total Protein  4.4 gm/dL (6.0-8.3)  L  18  07:42    


 


Albumin  2.3 gm/dL (3.7-5.3)  L  18  07:42    


 


Globulin  2.1 gm/dL  18  07:42    


 


Albumin/Globulin Ratio  1.1  (1.0-1.8)   18  07:42    


 


Triglycerides  126 mg/dL (<150)   18  04:10    


 


Cholesterol  124 mg/dL (<200)   18  04:10    


 


LDL Cholesterol Direct  84 mg/dL ()   18  04:10    


 


HDL Cholesterol  30 mg/dL (23-92)   18  04:10    


 


TSH  0.75 uIU/ml (0.34-5.60)   18  04:10    


 


Vancomycin Trough  19.0 ug/mL (5-10)  H  18  10:16    


 


Helicobacter pylori Ab  NEGATIVE  (NEGATIVE)   18  08:35    


 


Blood Type  O POSITIVE   18  19:40    


 


Antibody Screen  NEGATIVE   18  19:40    


 


Crossmatch  See Detail   18  19:40    














- Physical Exam


Vitals and I&O: 


 Vital Signs











Temp  98.2 F   18 10:00


 


Pulse  71   18 11:00


 


Resp  18   18 11:00


 


BP  162/43   18 11:00


 


Pulse Ox  99   18 11:00








 Intake & Output











 18





 18:59 06:59 18:59


 


Intake Total 1230 1900 50


 


Output Total 500 225 


 


Balance 730 1675 50


 


Weight (lbs) 75.296 kg 75.523 kg 


 


Intake:   


 


  Intake, IV Amount 100 1350 50


 


    Azithromycin 500 mg In  250 





    Sodium Chloride 0.9% 250   





    ml @ 250 mls/hr IV Q24HR   





    [facility] Rx#:[id number]   


 


    D5-0.45NS 1,000 ml @ 50  1000 





    mls/hr IV .Q20H [facility] Rx#:   





    [id number]   


 


    Piperacillin Sodium/ 100 100 50





    Tazobact 2.25 gm In   





    Sodium Chloride 0.9% 50   





    ml @ 100 mls/hr IV Q6H   





    [facility] Rx#:[id number]   


 


  Tube Feeding 780 490 


 


  Other 350 60 


 


Output:   


 


  Urine 500 225 


 


Other:   


 


  # Bowel Movements 1 0 


 


  Stool Characteristics Hard  


 


  Weight Source Bedscale Bedscale 











Active Medications: 


Current Medications





Acetaminophen (Tylenol)  650 mg PO Q4HR PRN


   PRN Reason: MILD PAIN 1-3


   Stop: 19 17:58


Acetaminophen (Tylenol)  650 mg PO Q4HR PRN


   PRN Reason: TEMP >100.4


   Stop: 19 17:58


Acetylcysteine (Mucomyst 20%)  3 ml HHN Q4HRT [facility]


   Stop: 19 14:59


   Last Admin: 18 06:57 Dose:  3 ml


Albuterol/Ipratropium (Duoneb Neb)  3 ml HHN Q4HRT [facility]


   Stop: 19 14:59


   Last Admin: 18 06:57 Dose:  3 ml


Amlodipine Besylate (Norvasc)  5 mg PO DAILY [facility]


   Stop: 19 08:59


   Last Admin: 18 08:28 Dose:  5 mg


Ascorbic Acid (Vitamin C)  500 mg PO DAILY [facility]


   Stop: 19 08:59


   Last Admin: 18 08:27 Dose:  500 mg


Bisacodyl (Dulcolax 10 Mg Supp)  10 mg RC DAILY PRN


   PRN Reason: IF MOM INEFFECTIVE


   Stop: 19 17:58


Chlorhexidine Gluconate (Peridex)  15 ml MM 0800,2000 [facility]


   Stop: 19 19:59


   Last Admin: 18 08:39 Dose:  15 ml


Azithromycin 500 mg/ Sodium (Chloride)  250 mls @ 250 mls/hr IV Q24HR [facility]


   Stop: 19 22:29


   Last Infusion: 18 23:36 Dose:  Infused


Dextrose/Sodium Chloride (D5-0.45ns)  1,000 mls @ 50 mls/hr IV .Q20H [facility]


   Stop: 19 17:29


   Last Admin: 18 06:14 Dose:  50 mls/hr


Diltiazem HCl 125 mg/ Dextrose  100 mls @ 0 mls/hr IV Q8H PRN; Protocol


   PRN Reason: blood pressure


   Stop: 19 07:48


   Last Admin: 18 09:00 Dose:  6.25 mg/hr, 5 mls/hr


Piperacillin Sod/Tazobactam (Sod 2.25 gm/ Sodium Chloride)  50 mls @ 100 mls/hr 

IV Q6H [facility]


   Stop: 19 13:59


   Last Infusion: 18 09:00 Dose:  Infused


Vancomycin HCl 0.75 gm/ Sodium (Chloride)  250 mls @ 165 mls/hr IV Q24H [facility]


   Stop: 19 09:59


   Last Admin: 18 10:49 Dose:  165 mls/hr


Insulin Aspart (Novolog Insulin Sliding Scale)  0 units SUBQ Q6HR [facility]; Protocol


   Stop: 19 01:14


   Last Admin: 18 06:13 Dose:  9 units


Lactobacillus Rhamnosus (Culturelle 15b)  1 each PO DAILY [facility]


   Stop: 19 08:59


   Last Admin: 18 08:28 Dose:  1 each


Lorazepam (Ativan)  1 mg IVP Q4HR PRN; Protocol


   PRN Reason: Agitation


   Stop: 19 07:12


   Last Admin: 18 08:29 Dose:  1 mg


Miscellaneous (Probiotic Screen)  1 ea  PRN PRN


   PRN Reason: PROTOCOL


   Stop: 19 15:29


Miscellaneous (Vancomycin Iv Per Pharmacy)  1 ea [facility] PRN [facility]


   Stop: 19 23:44


Miscellaneous (Zosyn Iv Per Pharmacy)  1 ea  PRN PRN


   PRN Reason: PROTOCOL


   Stop: 19 12:03


Morphine Sulfate (Morphine)  1 mg IVP Q4HR PRN


   PRN Reason: Pain (Moderate)


   Stop: 19 10:04


   Last Admin: 18 10:58 Dose:  1 mg


Mupirocin (Bactroban Oint)  1 appl NS BID [facility]


   Stop: 18 17:01


   Last Admin: 18 08:28 Dose:  1 appl


Pantoprazole Sodium (Protonix)  40 mg IVP BID [facility]


   Stop: 19 08:59


   Last Admin: 18 08:27 Dose:  40 mg


Polyethylene Glycol (Miralax)  17 gm PO BID [facility]


   Stop: 19 08:59


   Last Admin: 18 08:28 Dose:  17 gm








General: weak


HEENT: NC/AT


Neck: Supple


Lungs: CTAB


Cardiovascular: Normal S1, Normal S2


Abdomen: non-tender


Extremities: clear, edema


Neurological: no change





Internal Medicine Assmt/Plan





- Assessment


Assessment: 





 Current Active Problems











Problem Status Onset


 


COFFEE GROUND EMESIS Acute 








 r/o gi bleed 


Large hiatal hernia


lactic acidosis possible sepsis


htn


 DM


Dyslipidemia


Dementia 





- Plan


Plan: 





id consult


surgical consult


ivabx as ordered


gi consult 


cpm 





Nutritional Asmnt/Malnutr-PDOC





- Dietary Evaluation


Malnutrition Findings (Please click <Entered> for more info): 








Nutritional Asmnt/Malnutrition                             Start:  18 15:

21


Text:                                                      Status: Complete    

  


Freq:                                                                          

  


Protocol:                                                                      

  


 Document     18 15:22  DYANG  (Rec: 18 15:44  DYANG  [email or web address])


 Nutritional Asmnt/Malnutrition


     Patient General Information


      Nutritional Screening                      High Risk


      Diagnosis                                  sepsis, dehydration, pneumonia


      Pertinent Medical Hx/Surgical Hx           HTN, DM, dyslipidemia,


                                                 dementia, anemia, muscle


                                                 weakness


      Subjective Information                     Pt receiving care from RN at


                                                 time of visit. Per nurse notes


                                                 , pt had coffee ground emesis,


                                                 NGT was unable to be inserted


                                                 d/t large retro cardiac


                                                 hiatal hernia, and will have


                                                 EGD tomorrow. Spoke w/ RN


                                                 Vi by phone who verfied


                                                 the nurse notes and states pt


                                                 will continue to be NPO.


      Current Diet Order/ Nutrition Support      NPO


      Pertinent Medications                      D5-0.45ns, novolog, culturelle


                                                 , pantoprazole


      Pertinent Labs                             : glucose 392, Na 144, Cl


                                                 110, BUN 78, Alb 2.9, 


                                                 -423


                                                 : glucose 547, Na 134, Cl


                                                 97, BUN 69, Alb 3.0, -


                                                 485


     Nutritional Hx/Data


      Height                                     1.63 m


      Height (Calculated Centimeters)            162.6


      Current Weight (lbs)                       64.682 kg


      Weight (Calculated Kilograms)              64.7


      Weight (Calculated Grams)                  32251.3


      Ideal Body Weight                          120 lb


      Body Mass Index (BMI)                      24.5


      Weight Status                              Approriate


     GI Symptoms


      GI Symptoms                                Nausea


                                                 Vomitting


      Last BM                                    none noted


      Difficult in:                              None


      Food Allergies                             No


      Skin Integrity/Comment:                    angelica bee 13


      Current %PO                                Negligible < 25%


     Estimated Nutritional Goals


      BEE in Kcals:                              Using Current wt


      Calories/Kcals/Kg                          30-35


      Kcals Calculated                           8560-2172


      Protein:                                   Using Current wt


      Protein g/k.2-1.5


      Protein Calculated                         78-97 g


      Fluid: ml                                  per MD


     Nutritional Problem


      2. Problem


       Problem                                   Altered nutrition related lab


                                                 values


       Etiology                                  dehydration, endocrine


                                                 dysfunction


       Signs/Symptoms:                           Cl 110, BUN 78, glucose 392,


                                                 -423


      1. Problem


       Problem                                   Increased nutrient needs


       Etiology                                  metabolic stress


       Signs/Symptoms:                           dx of sepsis and pneumonia


     Malnutrition Related to Morbid Obesity


      Malnutrition related to morbid obesity     No


     Intervention/Recommendation


      Comments                                   1. Monitor NPO status and


                                                 advance diet when medically


                                                 appropriate


                                                 2. Consider alternative


                                                 nutrition route if pt unable


                                                 to tolerate PO intake


                                                 3. Monitor wt, nutrition


                                                 related labs, and skin


                                                 integrity


                                                 4. F/U as high risk in 2-3


                                                 days, -


     Expected Outcomes/Goals


      Expected Outcomes/Goals                    1. Pt to start oral intake or


                                                 alternate nutrition route if


                                                 necessary


                                                 2. Wt stability, skin to


                                                 remain intact, nutrition


                                                 related labs to approach


                                                 normal limits


                                                 Reveiwed by Lisa Haas RD

## 2018-11-25 NOTE — DIAGNOSTIC IMAGING REPORT
Exam: KUB of the abdomen



HISTORY constipation.



Findings:



Frontal examination the abdomen was reviewed.  The study demonstrates

severe scoliosis of lumbar spine.



NG tube in stomach.  There is no evidence for fecal impaction.  The

bowel gas is nonspecific.



IMPRESSION:



No evidence for fecal impaction.

## 2018-11-25 NOTE — DIAGNOSTIC IMAGING REPORT
Exam: Portable chest x-ray



HISTORY: Shortness of breath.



Findings:



Portable upright examination of the chest at 0911 hours reviewed the

study compared to prior examination day earlier essentially unchanged in

appearance with slightly better aeration left base   Endotracheal tube

and NG tube unchanged position.  Mediastinal structures midline the

heart is not enlarged.  The costophrenic angles are clear.  Mild

atelectasis left base appreciated.



IMPRESSION:



Improved aeration left base mild left basilar atelectasis.

## 2018-11-25 NOTE — GI PROGRESS NOTE
Subjective





- Review of Systems


Service Date: 11/25/18


Subjective: 


Large stool yesterday after enema, no bleeding





Objective





- Results


Result Diagrams: 


 11/25/18 07:42





 11/25/18 07:42


Recent Labs: 


 Laboratory Last Values











WBC  15.2 Th/cmm (4.8-10.8)  H  11/25/18  07:42    


 


RBC  2.94 Mil/cmm (3.80-5.20)  L  11/25/18  07:42    


 


Hgb  8.5 gm/dL (12-16)  L  11/25/18  07:42    


 


Hct  25.8 % (41.0-60)  L  11/25/18  07:42    


 


MCV  87.6 fl ()   11/25/18  07:42    


 


MCH  28.8 pg (27.0-31.0)   11/25/18  07:42    


 


MCHC Differential  32.9 pg (28.0-36.0)   11/25/18  07:42    


 


RDW  13.3 % (11.5-20.0)   11/25/18  07:42    


 


Plt Count  170 Th/cmm (150-400)   11/25/18  07:42    


 


MPV  8.9 fl  11/25/18  07:42    


 


Add Manual Diff  YES   11/22/18  06:10    


 


Neutrophils %  81.2 % (40.0-80.0)  H  11/25/18  07:42    


 


Band Neutrophils %  5 % (0-10)   11/21/18  04:45    


 


Lymphocytes %  12.2 % (20.0-50.0)  L  11/25/18  07:42    


 


Monocytes %  4.7 % (2.0-10.0)   11/25/18  07:42    


 


Eosinophils %  1.1 % (0.0-5.0)   11/25/18  07:42    


 


Basophils %  0.8 % (0.0-2.0)   11/25/18  07:42    


 


Neutrophils (Manual)  75 % (40-80)   11/22/18  06:10    


 


Lymphocytes  19 % (20-50)  L  11/22/18  06:10    


 


Monocytes  6 % (2-10)   11/22/18  06:10    


 


Eosinophils  3 % (0-5)   11/21/18  04:45    


 


Basophils  1 % (0-3)   11/21/18  04:45    


 


Platelet Estimate  ADEQUATE  (NORMAL)   11/22/18  06:10    


 


Anisocytosis  1+   11/22/18  06:10    


 


PT  10.6 SECONDS (9.5-11.5)   11/21/18  04:45    


 


INR  1.02  (0.5-1.4)   11/21/18  04:45    


 


PTT (Actin FS)  22.1 SECONDS (26.0-38.0)  L  11/19/18  19:40    


 


Specimen Source  Arterial   11/24/18  07:35    


 


Sample Site  RB   11/24/18  07:35    


 


pH  7.46  (7.35-7.45)  H  11/24/18  07:35    


 


pCO2  34.0 mmHg (35.0-45.0)  L  11/24/18  07:35    


 


pO2  62.0 mmHg (80.0-100.0)  L  11/24/18  07:35    


 


HCO3  25.5 mEq/L (20.0-26.0)   11/24/18  07:35    


 


Base Excess  0.8 mEq/L (-3.0-3.0)   11/24/18  07:35    


 


O2 Saturation  93.0 % (92.0-100.0)   11/24/18  07:35    


 


Boubacar Test  N/A   11/24/18  07:35    


 


Vent Rate  18   11/24/18  07:35    


 


Inspired O2  28   11/24/18  07:35    


 


Tidal Volume  450   11/24/18  07:35    


 


PEEP  5   11/24/18  07:35    


 


Pressure (ins/psv/peep)  N/A   11/24/18  07:35    


 


Critical Value  DM   11/24/18  07:35    


 


Sodium  145 mEq/L (136-145)   11/25/18  07:42    


 


Potassium  4.1 mEq/L (3.5-5.1)   11/25/18  07:42    


 


Chloride  116 mEq/L ()  H  11/25/18  07:42    


 


Carbon Dioxide  20.9 mEq/L (21.0-31.0)  L  11/25/18  07:42    


 


Anion Gap  12.2  (7.0-16.0)   11/25/18  07:42    


 


BUN  37 mg/dL (7-25)  H  11/25/18  07:42    


 


Creatinine  1.6 mg/dL (0.6-1.2)  H  11/25/18  07:42    


 


Est GFR ( Amer)  TNP   11/25/18  07:42    


 


Est GFR (Non-Af Amer)  TNP   11/25/18  07:42    


 


BUN/Creatinine Ratio  23.1   11/25/18  07:42    


 


Glucose  329 mg/dL ()  H  11/25/18  07:42    


 


POC Glucose  311 MG/DL (70 - 105)  H  11/25/18  06:06    


 


Whole Bld Lactic Acid  2.46 mmol/L (0.60-1.99)  H*  11/20/18  10:07    


 


Calcium  7.7 mg/dL (8.6-10.3)  L  11/25/18  07:42    


 


Phosphorus  2.8 mg/dL (2.5-5.0)   11/24/18  04:35    


 


Magnesium  2.2 mg/dL (1.9-2.7)   11/21/18  04:45    


 


Total Bilirubin  0.4 mg/dL (0.3-1.0)   11/25/18  07:42    


 


AST  11 U/L (13-39)  L  11/25/18  07:42    


 


ALT  9 U/L (7-52)   11/25/18  07:42    


 


Alkaline Phosphatase  70 U/L ()   11/25/18  07:42    


 


B-Natriuretic Peptide  1250.0 pg/mL (5.0-100.0)  H  11/25/18  07:42    


 


Total Protein  4.4 gm/dL (6.0-8.3)  L  11/25/18  07:42    


 


Albumin  2.3 gm/dL (3.7-5.3)  L  11/25/18  07:42    


 


Globulin  2.1 gm/dL  11/25/18  07:42    


 


Albumin/Globulin Ratio  1.1  (1.0-1.8)   11/25/18  07:42    


 


Triglycerides  126 mg/dL (<150)   11/20/18  04:10    


 


Cholesterol  124 mg/dL (<200)   11/20/18  04:10    


 


LDL Cholesterol Direct  84 mg/dL ()   11/20/18  04:10    


 


HDL Cholesterol  30 mg/dL (23-92)   11/20/18  04:10    


 


TSH  0.75 uIU/ml (0.34-5.60)   11/20/18  04:10    


 


Vancomycin Trough  19.0 ug/mL (5-10)  H  11/24/18  10:16    


 


Helicobacter pylori Ab  NEGATIVE  (NEGATIVE)   11/21/18  08:35    


 


Blood Type  O POSITIVE   11/19/18  19:40    


 


Antibody Screen  NEGATIVE   11/19/18  19:40    


 


Crossmatch  See Detail   11/19/18  19:40    














- Physical Exam


Vitals and I&O: 


 Vital Signs











Temp  98.1 F   11/25/18 06:00


 


Pulse  78   11/25/18 06:58


 


Resp  18   11/25/18 06:00


 


BP  163/53   11/25/18 06:00


 


Pulse Ox  100   11/25/18 06:58








 Intake & Output











 11/24/18 11/25/18 11/25/18





 18:59 06:59 18:59


 


Intake Total 1230 1900 


 


Output Total 500 225 


 


Balance 730 1675 


 


Weight (lbs) 75.296 kg 75.523 kg 


 


Intake:   


 


  Intake, IV Amount 100 1350 


 


    Azithromycin 500 mg In  250 





    Sodium Chloride 0.9% 250   





    ml @ 250 mls/hr IV Q24HR   





    [facility] Rx#:[id number]   


 


    D5-0.45NS 1,000 ml @ 50  1000 





    mls/hr IV .Q20H [facility] Rx#:   





    [id number]   


 


    Piperacillin Sodium/ 100 100 





    Tazobact 2.25 gm In   





    Sodium Chloride 0.9% 50   





    ml @ 100 mls/hr IV Q6H   





    [facility] Rx#:[id number]   


 


  Tube Feeding 780 490 


 


  Other 350 60 


 


Output:   


 


  Urine 500 225 


 


Other:   


 


  # Bowel Movements 1 0 


 


  Stool Characteristics Hard  


 


  Weight Source Bedscale Bedscale 











Active Medications: 


Current Medications





Acetaminophen (Tylenol)  650 mg PO Q4HR PRN


   PRN Reason: MILD PAIN 1-3


   Stop: 01/19/19 17:58


Acetaminophen (Tylenol)  650 mg PO Q4HR PRN


   PRN Reason: TEMP >100.4


   Stop: 01/19/19 17:58


Acetylcysteine (Mucomyst 20%)  3 ml HHN Q4HRT [facility]


   Stop: 01/21/19 14:59


   Last Admin: 11/25/18 06:57 Dose:  3 ml


Albuterol/Ipratropium (Duoneb Neb)  3 ml HHN Q4HRT [facility]


   Stop: 01/20/19 14:59


   Last Admin: 11/25/18 06:57 Dose:  3 ml


Amlodipine Besylate (Norvasc)  5 mg PO DAILY [facility]


   Stop: 01/20/19 08:59


   Last Admin: 11/24/18 08:42 Dose:  5 mg


Ascorbic Acid (Vitamin C)  500 mg PO DAILY [facility]


   Stop: 01/20/19 08:59


   Last Admin: 11/24/18 08:42 Dose:  500 mg


Bisacodyl (Dulcolax 10 Mg Supp)  10 mg RC DAILY PRN


   PRN Reason: IF MOM INEFFECTIVE


   Stop: 01/19/19 17:58


Chlorhexidine Gluconate (Peridex)  15 ml MM 0800,2000 [facility]


   Stop: 01/21/19 19:59


   Last Admin: 11/24/18 21:00 Dose:  15 ml


Azithromycin 500 mg/ Sodium (Chloride)  250 mls @ 250 mls/hr IV Q24HR [facility]


   Stop: 01/18/19 22:29


   Last Infusion: 11/24/18 23:36 Dose:  Infused


Dextrose/Sodium Chloride (D5-0.45ns)  1,000 mls @ 50 mls/hr IV .Q20H [facility]


   Stop: 01/20/19 17:29


   Last Admin: 11/25/18 06:14 Dose:  50 mls/hr


Diltiazem HCl 125 mg/ Dextrose  100 mls @ 0 mls/hr IV Q8H PRN; Protocol


   PRN Reason: blood pressure


   Stop: 01/21/19 07:48


   Last Admin: 11/22/18 09:00 Dose:  6.25 mg/hr, 5 mls/hr


Piperacillin Sod/Tazobactam (Sod 2.25 gm/ Sodium Chloride)  50 mls @ 100 mls/hr 

IV Q6H [facility]


   Stop: 01/21/19 13:59


   Last Infusion: 11/25/18 02:30 Dose:  Infused


Vancomycin HCl 0.75 gm/ Sodium (Chloride)  250 mls @ 165 mls/hr IV Q24H [facility]


   Stop: 01/24/19 09:59


Insulin Aspart (Novolog Insulin Sliding Scale)  0 units SUBQ Q6HR [facility]; Protocol


   Stop: 01/19/19 01:14


   Last Admin: 11/25/18 06:13 Dose:  9 units


Lactobacillus Rhamnosus (Culturelle 15b)  1 each PO DAILY [facility]


   Stop: 01/20/19 08:59


   Last Admin: 11/24/18 08:42 Dose:  1 each


Lorazepam (Ativan)  1 mg IVP Q4HR PRN; Protocol


   PRN Reason: Agitation


   Stop: 01/21/19 07:12


   Last Admin: 11/23/18 08:29 Dose:  1 mg


Miscellaneous (Probiotic Screen)  1 ea  PRN PRN


   PRN Reason: PROTOCOL


   Stop: 01/19/19 15:29


Miscellaneous (Vancomycin Iv Per Pharmacy)  1 ea  PRN [facility]


   Stop: 01/20/19 23:44


Miscellaneous (Zosyn Iv Per Pharmacy)  1 ea  PRN PRN


   PRN Reason: PROTOCOL


   Stop: 01/21/19 12:03


Morphine Sulfate (Morphine)  1 mg IVP Q4HR PRN


   PRN Reason: Pain (Moderate)


   Stop: 01/19/19 10:04


   Last Admin: 11/23/18 10:58 Dose:  1 mg


Mupirocin (Bactroban Oint)  1 appl NS BID [facility]


   Stop: 11/26/18 17:01


   Last Admin: 11/24/18 17:29 Dose:  1 appl


Pantoprazole Sodium (Protonix)  40 mg IVP BID [facility]


   Stop: 01/22/19 08:59


   Last Admin: 11/24/18 17:29 Dose:  40 mg


Polyethylene Glycol (Miralax)  17 gm PO BID [facility]


   Stop: 01/22/19 08:59


   Last Admin: 11/24/18 17:29 Dose:  17 gm








General: Alert


HEENT: Atraumatic


Neck: Supple


Cardiovascular: no Regular rate


Abdomen: Bowel sounds, Soft, no Tender, no Hepatomegaly, no Splenomegaly, no 

Distended, no Rebound





Assessment/Plan





- Problem List


Patient Problems: 


All Active Problems





COFFEE GROUND EMESIS (Acute) 











- Assessment


Assessment: 


# Coffee ground emesis


# hiatal hernia


# Pneumonia


# Severe sepsis


# Fecal impaction





EGD on 11/21 showed 7cm hiatal hernia, with evidence of mucosal tear there from 

previous NG tube insertion attempts. 2 clips placed to this area. With some 

difficulty, an NG tube was endoscopically placed into the stomach. No stomach 

ulcer or mass. 





Now intubated, but no longer having GI bleed. Fecal impaction noted on KUB, but 

pt did have large stool on 11/24 with enema








Plan:





- start NG tube feeding with glucerna, titrate up to goal slowly


- change ppi drip to bid


- tap water enema q 12, and miralax bid po


- cont IVF


- cont Abx


- Plan for G tube insertion on 11/27 in AM


- KUB today. If still impacted, will get another tap water enema

## 2018-11-26 LAB
ANION GAP SERPL CALC-SCNC: 14.3 MMOL/L (ref 7–16)
BASOPHILS # BLD AUTO: 0.1 TH/CUMM (ref 0–0.2)
BASOPHILS NFR BLD AUTO: 0.5 % (ref 0–2)
BUN SERPL-MCNC: 37 MG/DL (ref 7–25)
CALCIUM SERPL-MCNC: 8 MG/DL (ref 8.6–10.3)
CHLORIDE SERPL-SCNC: 112 MEQ/L (ref 98–107)
CO2 SERPL-SCNC: 23.8 MEQ/L (ref 21–31)
CREAT SERPL-MCNC: 1.7 MG/DL (ref 0.6–1.2)
EOSINOPHIL # BLD AUTO: 0.1 TH/CMM (ref 0.1–0.4)
EOSINOPHIL NFR BLD AUTO: 0.8 % (ref 0–5)
EOSINOPHIL NFR BLD: 1 % (ref 0–5)
ERYTHROCYTE [DISTWIDTH] IN BLOOD BY AUTOMATED COUNT: 13.3 % (ref 11.5–20)
GLUCOSE SERPL-MCNC: 399 MG/DL (ref 70–105)
HCT VFR BLD CALC: 26.2 % (ref 41–60)
HGB BLD-MCNC: 8.6 GM/DL (ref 12–16)
LYMPHOCYTE AB SER FC-ACNC: 1.3 TH/CMM (ref 1.5–3)
LYMPHOCYTES # BLD MANUAL: 18 % (ref 20–50)
LYMPHOCYTES NFR BLD AUTO: 8 % (ref 20–50)
MCH RBC QN AUTO: 28.8 PG (ref 27–31)
MCHC RBC AUTO-ENTMCNC: 33 PG (ref 28–36)
MCV RBC AUTO: 87.2 FL (ref 81–100)
MONOCYTES # BLD AUTO: 0.9 TH/CMM (ref 0.3–1)
MONOCYTES # BLD MANUAL: 3 % (ref 2–10)
MONOCYTES NFR BLD AUTO: 5.3 % (ref 2–10)
NEUTROPHILS # BLD: 13.7 TH/CMM (ref 1.8–8)
NEUTROPHILS NFR BLD AUTO: 75 % (ref 40–80)
NEUTROPHILS NFR BLD AUTO: 85.4 % (ref 40–80)
NEUTS BAND NFR BLD: 3 % (ref 0–10)
PLATELET # BLD EST: ADEQUATE 10*3/UL
PLATELET # BLD: 187 TH/CMM (ref 150–400)
PMV BLD AUTO: 9.5 FL
POTASSIUM SERPL-SCNC: 4.1 MEQ/L (ref 3.5–5.1)
RBC # BLD AUTO: 3.01 MIL/CMM (ref 3.8–5.2)
SODIUM SERPL-SCNC: 146 MEQ/L (ref 136–145)
WBC # BLD AUTO: 16.1 TH/CMM (ref 4.8–10.8)

## 2018-11-26 RX ADMIN — IPRATROPIUM BROMIDE AND ALBUTEROL SULFATE SCH ML: .5; 3 SOLUTION RESPIRATORY (INHALATION) at 03:12

## 2018-11-26 RX ADMIN — INSULIN ASPART SCH UNITS: 100 INJECTION, SOLUTION INTRAVENOUS; SUBCUTANEOUS at 06:55

## 2018-11-26 RX ADMIN — IPRATROPIUM BROMIDE AND ALBUTEROL SULFATE SCH ML: .5; 3 SOLUTION RESPIRATORY (INHALATION) at 19:12

## 2018-11-26 RX ADMIN — SODIUM CHLORIDE SCH MLS/HR: 9 INJECTION, SOLUTION INTRAVENOUS at 02:00

## 2018-11-26 RX ADMIN — MORPHINE SULFATE PRN MG: 2 INJECTION, SOLUTION INTRAMUSCULAR; INTRAVENOUS at 15:22

## 2018-11-26 RX ADMIN — POLYETHYLENE GLYCOL 3350 SCH GM: 17 POWDER, FOR SOLUTION ORAL at 17:05

## 2018-11-26 RX ADMIN — MORPHINE SULFATE PRN MG: 2 INJECTION, SOLUTION INTRAMUSCULAR; INTRAVENOUS at 20:22

## 2018-11-26 RX ADMIN — IPRATROPIUM BROMIDE AND ALBUTEROL SULFATE SCH ML: .5; 3 SOLUTION RESPIRATORY (INHALATION) at 22:45

## 2018-11-26 RX ADMIN — MORPHINE SULFATE PRN MG: 2 INJECTION, SOLUTION INTRAMUSCULAR; INTRAVENOUS at 08:24

## 2018-11-26 RX ADMIN — INSULIN ASPART SCH UNITS: 100 INJECTION, SOLUTION INTRAVENOUS; SUBCUTANEOUS at 13:01

## 2018-11-26 RX ADMIN — IPRATROPIUM BROMIDE AND ALBUTEROL SULFATE SCH ML: .5; 3 SOLUTION RESPIRATORY (INHALATION) at 10:55

## 2018-11-26 RX ADMIN — SODIUM CHLORIDE SCH MLS/HR: 9 INJECTION, SOLUTION INTRAVENOUS at 20:22

## 2018-11-26 RX ADMIN — IPRATROPIUM BROMIDE AND ALBUTEROL SULFATE SCH ML: .5; 3 SOLUTION RESPIRATORY (INHALATION) at 15:22

## 2018-11-26 RX ADMIN — CHLORHEXIDINE GLUCONATE SCH ML: 1.2 RINSE ORAL at 08:35

## 2018-11-26 RX ADMIN — IPRATROPIUM BROMIDE AND ALBUTEROL SULFATE SCH ML: .5; 3 SOLUTION RESPIRATORY (INHALATION) at 07:36

## 2018-11-26 RX ADMIN — SODIUM CHLORIDE SCH MLS/HR: 9 INJECTION, SOLUTION INTRAVENOUS at 17:05

## 2018-11-26 RX ADMIN — SODIUM CHLORIDE SCH MLS/HR: 9 INJECTION, SOLUTION INTRAVENOUS at 08:26

## 2018-11-26 RX ADMIN — CHLORHEXIDINE GLUCONATE SCH ML: 1.2 RINSE ORAL at 20:23

## 2018-11-26 RX ADMIN — POLYETHYLENE GLYCOL 3350 SCH GM: 17 POWDER, FOR SOLUTION ORAL at 08:24

## 2018-11-26 RX ADMIN — Medication SCH EACH: at 08:24

## 2018-11-26 RX ADMIN — INSULIN ASPART SCH: 100 INJECTION, SOLUTION INTRAVENOUS; SUBCUTANEOUS at 18:28

## 2018-11-26 NOTE — DIAGNOSTIC IMAGING REPORT
Portable chest x-ray



HISTORY: Shortness of breath



Patient is rotated.  The heart appears enlarged.  Endotracheal tube tip

is approximately 2.5 cm above the ramos.  No definite focal pulmonary

parenchymal processes.



IMPRESSION:

1.  No definite focal pulmonary parenchymal processes

2.  Cardiomegaly

3.  Endotracheal tube placement as noted above

## 2018-11-26 NOTE — CONSULTATION
DATE OF CONSULTATION:  11/25/2018



HISTORY OF PRESENT ILLNESS:  This 88-year-old female was seen and examined on

date 11/25/2018.  The patient was admitted here to ICU with the GI bleeding and

having coffee-ground emesis, has had history of GI bleeding in the past.  There

is no history available from the patient.  The patient also had respiratory

failure, was intubated.  Right now, she is in ICU on ventilator.  She also has a

history of diabetes type 2, hypertension, hyperlipidemia, were as found to meet

sepsis.  Lactic acid was high.  She has history of large hiatal hernia and

dementia.  She was also found to be in sinus tachycardia.  When she was

admitted, diabetes was out of control.  There is no other history available from

the patient as the patient is vented and has dementia from the chart that

obtained.



FAMILY HISTORY:  Not significant.



SOCIAL HISTORY:  The patient is not a smoker, does not drink alcohol, no drug

abuse.



REVIEW OF SYSTEMS:  Not available from the patient.



PHYSICAL EXAMINATION:

VITAL SIGNS:  Heart rate was 89, blood pressure was 137/49.  The patient in

sinus, respirations 18, O2 saturation 98%, temperature 98.5.

SKIN:  Normal.

HEAD:  Normocephalic.

EYES:  Conjunctivae were pink.  There is no icterus in the eyes.  Pupils

reactive to light.

NECK:  There were no increased jugular venous distention, no thyromegaly, no

lymphadenopathy.  Carotids equal both sides.

CHEST:  Bilaterally symmetrical, moved well with respiration.  Respiratory

movements equal both sides.  Trachea is central.  There is note to percussion. 

Breath sounds, few basilar rales.  Occasional rhonchi.

CARDIOVASCULAR SYSTEM:  PMI not well localized and no positional thrill.  No

parasternal heave.  S1 normal, S2 physiologic.  There were no S3, no rub.

ABDOMEN:  Soft, no tenderness, no rigidity, no guarding, no organomegaly.  Bowel

sounds normal.  There was no rebound.

EXTREMITIES:  No calf tenderness.  Peripheral pulses diminished.



EKG shows sinus tachycardia, possible LVH.



LABORATORY DATA:  On looking at the lab, WBC count was 15.2, hemoglobin 8.5,

hematocrit 25.8.  BNP was 1250.  Sodium 145, potassium 4.1, chloride 116, CO2 of

20.9, glucose 329, BUN 37, creatinine 1.6, SGOT 31, SGPT 9, alkaline phosphatase

70.  Last blood gases done that was on 11/20/2018, pH was 7.190, pCO2 of 67, pO2

of 43.  The patient received 2 units of packed cells also.  Cholesterol was 124,

triglyceride 126, HDL 30, LDL 84, magnesium 2.3.  TSH 0.75.  Last lactic acid on

19 was 2.89.  INR was 1, PTT 22.1.



IMPRESSION:  Sinus tachycardia; respiratory failure, on vent; gastrointestinal

bleeding; anemia, status post transfusion; diabetes type 2; hyperlipidemia;

hypertension; sepsis; lactic acidosis; large hiatal hernia and dementia and

chronic kidney disease, plus we will continue present management.  Her BUN is

also high along the mucosa.  CAD ____ is high, but part of the BUN elevation

related to gastrointestinal bleed, may be primary doctor might get a renal

ultrasound to evaluate for chronic kidney problems.  Her blood pressure seems to

be under control.  Sinus tachycardia is probably related to being on ventilator

as well as anemia.  Other problem is ongoing sepsis.  We should repeat lactic

acid.  He is already on IV antibiotics and to keep a close watch on BMP and

magnesium and may be a small dose of diuretic with a very close watch on BMP may

be beneficial to the patient, may be echocardiogram if not done recently should

be done also.  We will follow with you, probably Dr. Bass will be following

from the morning.





DD: 11/26/2018 04:25

DT: 11/26/2018 04:56

JOB# [id number]  [id number]

## 2018-11-26 NOTE — DIAGNOSTIC IMAGING REPORT
KUB abdominal film



HISTORY: Pain, constipation



The exam demonstrates nondilated large and slightly dilated small bowel.

 Overall appearance is nonspecific.  No free intraperitoneal air. 

Severe scoliosis throughout the spine.  Nasogastric tube projects over

the mid abdomen.



IMPRESSION: Nonspecific bowel gas pattern

## 2018-11-26 NOTE — GI PROGRESS NOTE
Subjective





- Review of Systems


Service Date: 11/26/18


Subjective: 


More awake today, no further BM





Objective





- Results


Result Diagrams: 


 11/26/18 04:45





 11/26/18 04:45


Recent Labs: 


 Laboratory Last Values











WBC  16.1 Th/cmm (4.8-10.8)  H  11/26/18  04:45    


 


RBC  3.01 Mil/cmm (3.80-5.20)  L  11/26/18  04:45    


 


Hgb  8.6 gm/dL (12-16)  L  11/26/18  04:45    


 


Hct  26.2 % (41.0-60)  L  11/26/18  04:45    


 


MCV  87.2 fl ()   11/26/18  04:45    


 


MCH  28.8 pg (27.0-31.0)   11/26/18  04:45    


 


MCHC Differential  33.0 pg (28.0-36.0)   11/26/18  04:45    


 


RDW  13.3 % (11.5-20.0)   11/26/18  04:45    


 


Plt Count  187 Th/cmm (150-400)   11/26/18  04:45    


 


MPV  9.5 fl  11/26/18  04:45    


 


Add Manual Diff  YES   11/22/18  06:10    


 


Neutrophils %  85.4 % (40.0-80.0)  H  11/26/18  04:45    


 


Band Neutrophils %  3 % (0-10)   11/26/18  04:45    


 


Lymphocytes %  8.0 % (20.0-50.0)  L  11/26/18  04:45    


 


Monocytes %  5.3 % (2.0-10.0)   11/26/18  04:45    


 


Eosinophils %  0.8 % (0.0-5.0)   11/26/18  04:45    


 


Basophils %  0.5 % (0.0-2.0)   11/26/18  04:45    


 


Neutrophils (Manual)  75 % (40-80)   11/26/18  04:45    


 


Lymphocytes  18 % (20-50)  L  11/26/18  04:45    


 


Monocytes  3 % (2-10)   11/26/18  04:45    


 


Eosinophils  1 % (0-5)   11/26/18  04:45    


 


Basophils  1 % (0-3)   11/21/18  04:45    


 


Platelet Estimate  ADEQUATE  (NORMAL)   11/26/18  04:45    


 


Anisocytosis  1+   11/22/18  06:10    


 


PT  10.6 SECONDS (9.5-11.5)   11/21/18  04:45    


 


INR  1.02  (0.5-1.4)   11/21/18  04:45    


 


PTT (Actin FS)  22.1 SECONDS (26.0-38.0)  L  11/19/18  19:40    


 


Specimen Source  Arterial   11/24/18  07:35    


 


Sample Site  RB   11/24/18  07:35    


 


pH  7.46  (7.35-7.45)  H  11/24/18  07:35    


 


pCO2  34.0 mmHg (35.0-45.0)  L  11/24/18  07:35    


 


pO2  62.0 mmHg (80.0-100.0)  L  11/24/18  07:35    


 


HCO3  25.5 mEq/L (20.0-26.0)   11/24/18  07:35    


 


Base Excess  0.8 mEq/L (-3.0-3.0)   11/24/18  07:35    


 


O2 Saturation  93.0 % (92.0-100.0)   11/24/18  07:35    


 


Boubacar Test  N/A   11/24/18  07:35    


 


Vent Rate  18   11/24/18  07:35    


 


Inspired O2  28   11/24/18  07:35    


 


Tidal Volume  450   11/24/18  07:35    


 


PEEP  5   11/24/18  07:35    


 


Pressure (ins/psv/peep)  N/A   11/24/18  07:35    


 


Critical Value  DM   11/24/18  07:35    


 


Sodium  146 mEq/L (136-145)  H  11/26/18  04:45    


 


Potassium  4.1 mEq/L (3.5-5.1)   11/26/18  04:45    


 


Chloride  112 mEq/L ()  H  11/26/18  04:45    


 


Carbon Dioxide  23.8 mEq/L (21.0-31.0)   11/26/18  04:45    


 


Anion Gap  14.3  (7.0-16.0)   11/26/18  04:45    


 


BUN  37 mg/dL (7-25)  H  11/26/18  04:45    


 


Creatinine  1.7 mg/dL (0.6-1.2)  H  11/26/18  04:45    


 


Est GFR ( Amer)  TNP   11/26/18  04:45    


 


Est GFR (Non-Af Amer)  TNP   11/26/18  04:45    


 


BUN/Creatinine Ratio  21.8   11/26/18  04:45    


 


Glucose  399 mg/dL ()  H  11/26/18  04:45    


 


POC Glucose  379 MG/DL (70 - 105)  H  11/26/18  06:41    


 


Whole Bld Lactic Acid  2.46 mmol/L (0.60-1.99)  H*  11/20/18  10:07    


 


Calcium  8.0 mg/dL (8.6-10.3)  L  11/26/18  04:45    


 


Phosphorus  2.8 mg/dL (2.5-5.0)   11/24/18  04:35    


 


Magnesium  2.2 mg/dL (1.9-2.7)   11/21/18  04:45    


 


Total Bilirubin  0.4 mg/dL (0.3-1.0)   11/25/18  07:42    


 


AST  11 U/L (13-39)  L  11/25/18  07:42    


 


ALT  9 U/L (7-52)   11/25/18  07:42    


 


Alkaline Phosphatase  70 U/L ()   11/25/18  07:42    


 


B-Natriuretic Peptide  1390.0 pg/mL (5.0-100.0)  H  11/26/18  04:45    


 


Total Protein  4.4 gm/dL (6.0-8.3)  L  11/25/18  07:42    


 


Albumin  2.3 gm/dL (3.7-5.3)  L  11/25/18  07:42    


 


Globulin  2.1 gm/dL  11/25/18  07:42    


 


Albumin/Globulin Ratio  1.1  (1.0-1.8)   11/25/18  07:42    


 


Triglycerides  126 mg/dL (<150)   11/20/18  04:10    


 


Cholesterol  124 mg/dL (<200)   11/20/18  04:10    


 


LDL Cholesterol Direct  84 mg/dL ()   11/20/18  04:10    


 


HDL Cholesterol  30 mg/dL (23-92)   11/20/18  04:10    


 


TSH  0.75 uIU/ml (0.34-5.60)   11/20/18  04:10    


 


Vancomycin Trough  19.0 ug/mL (5-10)  H  11/24/18  10:16    


 


Helicobacter pylori Ab  NEGATIVE  (NEGATIVE)   11/21/18  08:35    


 


Blood Type  O POSITIVE   11/19/18  19:40    


 


Antibody Screen  NEGATIVE   11/19/18  19:40    


 


Crossmatch  See Detail   11/19/18  19:40    














- Physical Exam


Vitals and I&O: 


 Vital Signs











Temp  98.5 F   11/26/18 04:00


 


Pulse  95   11/26/18 08:23


 


Resp  28   11/26/18 06:00


 


BP  150/48   11/26/18 08:37


 


Pulse Ox  100   11/26/18 07:38








 Intake & Output











 11/25/18 11/26/18 11/26/18





 18:59 06:59 18:59


 


Intake Total 1050 950 


 


Output Total 1400 750 


 


Balance -350 200 


 


Weight (lbs) 78.562 kg 74.843 kg 


 


Intake:   


 


  Intake, IV Amount 350 350 


 


    Azithromycin 500 mg In  250 





    Sodium Chloride 0.9% 250   





    ml @ 250 mls/hr IV Q24HR   





    [facility] Rx#:[id number]   


 


    Piperacillin Sodium/ 100 100 





    Tazobact 2.25 gm In   





    Sodium Chloride 0.9% 50   





    ml @ 100 mls/hr IV Q6H   





    [facility] Rx#:[id number]   


 


    Vancomycin HCl 0.75 gm In 250  





    Sodium Chloride 0.9% 250   





    ml @ 165 mls/hr IV Q24H   





    [facility] Rx#:[id number]   


 


  Oral  0 


 


  Tube Feeding 500 500 


 


  Other 200 100 


 


Output:   


 


  Urine 1400 750 


 


Other:   


 


  # Bowel Movements  0 


 


  Weight Source Bedscale Bedscale 











Active Medications: 


Current Medications





Acetaminophen (Tylenol)  650 mg PO Q4HR PRN


   PRN Reason: MILD PAIN 1-3


   Stop: 01/19/19 17:58


Acetaminophen (Tylenol)  650 mg PO Q4HR PRN


   PRN Reason: TEMP >100.4


   Stop: 01/19/19 17:58


Acetylcysteine (Mucomyst 20%)  3 ml HHN Q4HRT [facility]


   Stop: 01/21/19 14:59


   Last Admin: 11/26/18 07:37 Dose:  3 ml


Albuterol/Ipratropium (Duoneb Neb)  3 ml HHN Q4HRT [facility]


   Stop: 01/20/19 14:59


   Last Admin: 11/26/18 07:36 Dose:  3 ml


Amlodipine Besylate (Norvasc)  5 mg PO DAILY [facility]


   Stop: 01/20/19 08:59


   Last Admin: 11/26/18 08:23 Dose:  5 mg


Ascorbic Acid (Vitamin C)  500 mg PO DAILY [facility]


   Stop: 01/20/19 08:59


   Last Admin: 11/26/18 08:23 Dose:  500 mg


Atorvastatin Calcium (Lipitor)  10 mg PO HS [facility]; Protocol


   Stop: 01/25/19 20:59


Bisacodyl (Dulcolax 10 Mg Supp)  10 mg RC DAILY PRN


   PRN Reason: IF MOM INEFFECTIVE


   Stop: 01/19/19 17:58


Chlorhexidine Gluconate (Peridex)  15 ml MM 0800,2000 [facility]


   Stop: 01/21/19 19:59


   Last Admin: 11/26/18 08:35 Dose:  15 ml


Azithromycin 500 mg/ Sodium (Chloride)  250 mls @ 250 mls/hr IV Q24HR [facility]


   Stop: 01/18/19 22:29


   Last Infusion: 11/25/18 23:23 Dose:  Infused


Diltiazem HCl 125 mg/ Dextrose  100 mls @ 0 mls/hr IV Q8H PRN; Protocol


   PRN Reason: blood pressure


   Stop: 01/21/19 07:48


   Last Admin: 11/22/18 09:00 Dose:  6.25 mg/hr, 5 mls/hr


Piperacillin Sod/Tazobactam (Sod 2.25 gm/ Sodium Chloride)  50 mls @ 100 mls/hr 

IV Q6H [facility]


   Stop: 01/21/19 13:59


   Last Admin: 11/26/18 08:26 Dose:  100 mls/hr


Vancomycin HCl 0.75 gm/ Sodium (Chloride)  250 mls @ 165 mls/hr IV Q24H [facility]


   Stop: 01/24/19 09:59


   Last Infusion: 11/25/18 12:20 Dose:  Infused


Insulin Aspart (Novolog Insulin Sliding Scale)  0 units SUBQ Q6HR [facility]; Protocol


   Stop: 01/19/19 01:14


   Last Admin: 11/26/18 06:55 Dose:  11 units


Lactobacillus Rhamnosus (Culturelle 15b)  1 each PO DAILY [facility]


   Stop: 01/20/19 08:59


   Last Admin: 11/26/18 08:24 Dose:  1 each


Lorazepam (Ativan)  1 mg IVP Q4HR PRN; Protocol


   PRN Reason: Agitation


   Stop: 01/21/19 07:12


   Last Admin: 11/23/18 08:29 Dose:  1 mg


Miscellaneous (Probiotic Screen)  1 ea MC PRN PRN


   PRN Reason: PROTOCOL


   Stop: 01/19/19 15:29


Miscellaneous (Vancomycin Iv Per Pharmacy)  1 ea MC PRN [facility]


   Stop: 01/20/19 23:44


Miscellaneous (Zosyn Iv Per Pharmacy)  1 ea MC PRN PRN


   PRN Reason: PROTOCOL


   Stop: 01/21/19 12:03


Morphine Sulfate (Morphine)  1 mg IVP Q4HR PRN


   PRN Reason: Pain (Moderate)


   Stop: 01/19/19 10:04


   Last Admin: 11/26/18 08:24 Dose:  1 mg


Mupirocin (Bactroban Oint)  1 appl NS BID [facility]


   Stop: 11/26/18 17:01


   Last Admin: 11/26/18 08:39 Dose:  1 appl


Pantoprazole Sodium (Protonix)  40 mg IVP BID [facility]


   Stop: 01/22/19 08:59


   Last Admin: 11/26/18 08:23 Dose:  40 mg


Polyethylene Glycol (Miralax)  17 gm PO BID [facility]


   Stop: 01/22/19 08:59


   Last Admin: 11/26/18 08:24 Dose:  17 gm








General: Alert


HEENT: Atraumatic


Neck: Supple


Cardiovascular: no Regular rate


Abdomen: Bowel sounds, Soft, no Tender, no Hepatomegaly, no Splenomegaly, no 

Distended, no Rebound





Assessment/Plan





- Problem List


Patient Problems: 


All Active Problems





COFFEE GROUND EMESIS (Acute) 











- Assessment


Assessment: 


# Coffee ground emesis


# hiatal hernia


# Pneumonia


# Severe sepsis


# Fecal impaction





EGD on 11/21 showed 7cm hiatal hernia, with evidence of mucosal tear there from 

previous NG tube insertion attempts. 2 clips placed to this area. With some 

difficulty, an NG tube was endoscopically placed into the stomach. No stomach 

ulcer or mass. 





Now intubated, but no longer having GI bleed. Fecal impaction noted on KUB, but 

pt did have large stool on 11/24 with enema.








Plan:





- cont ppi bid


- cont IVF


- cont Abx


- Plan for G tube insertion on 11/27 in AM. This may be challenging given large 

hernia


- KUB today given abdomen appears distended


- NPO midnight

## 2018-11-26 NOTE — CARDIOLOGY
11/26/2018



The patient of Dr. Ivey.



PROCEDURE:  Echocardiogram.



M-MODE ECHOCARDIOGRAM:  Mitral valve, anterior leaflet of mitral valve shows

normal excursion, EF velocity.  Posterior leaflet of the mitral valve shows

normal excursion.  Left ventricular posterior wall shows increased thickness,

normal excursion.  Interventricular septum shows increased thickness, normal

excursion, hypertrophy of the left ventricle, ejection fraction 60%.  Left

atrium normal.  Aortic root shows normal dimension, normal excursion of aortic

leaflets.



CONCLUSION:  Hypertrophy of the left ventricle, ejection fraction 60%.



2D ECHO:  Long axis view showed normal sized left ventricle with hypertrophy of

the left ventricle.  Left atrium normal.  Aortic root shows normal dimension,

normal excursion of aortic leaflets.  Short axis view of mitral valve normal. 

Short axis view of aortic valve normal.  Apical four chamber view shows normal

sized left ventricle with hypertrophy of the left ventricle.  Left atrium

normal.  Right ventricular cavity, right atrium normal, no pericardial effusion.



CONCLUSION:  Hypertrophy of the left ventricle, ejection fraction 60%.



Doppler study shows severe mitral regurgitation, moderate tricuspid

regurgitation, moderate pulmonary regurgitation, right ventricular systolic

pressure 72 mmHg with severe pulmonary hypertension.





DD: 11/26/2018 12:41

DT: 11/26/2018 15:41

JOB# [id number]  [id number]

## 2018-11-26 NOTE — INFECTIOUS DISEASE PROG NOTE
Infectious Disease Subjective





- Review of Systems


Service Date: 18


Subjective: 





NO  fever. intubated orally, on the vent support.





Infectious Disease Objective





- Results


Result Diagrams: 


 18 04:45





 18 04:45


Recent Labs: 


 Laboratory Last Values











WBC  16.1 Th/cmm (4.8-10.8)  H  18  04:45    


 


RBC  3.01 Mil/cmm (3.80-5.20)  L  18  04:45    


 


Hgb  8.6 gm/dL (12-16)  L  18  04:45    


 


Hct  26.2 % (41.0-60)  L  18  04:45    


 


MCV  87.2 fl ()   18  04:45    


 


MCH  28.8 pg (27.0-31.0)   18  04:45    


 


MCHC Differential  33.0 pg (28.0-36.0)   18  04:45    


 


RDW  13.3 % (11.5-20.0)   18  04:45    


 


Plt Count  187 Th/cmm (150-400)   18  04:45    


 


MPV  9.5 fl  18  04:45    


 


Add Manual Diff  YES   18  06:10    


 


Neutrophils %  85.4 % (40.0-80.0)  H  18  04:45    


 


Band Neutrophils %  3 % (0-10)   18  04:45    


 


Lymphocytes %  8.0 % (20.0-50.0)  L  18  04:45    


 


Monocytes %  5.3 % (2.0-10.0)   18  04:45    


 


Eosinophils %  0.8 % (0.0-5.0)   18  04:45    


 


Basophils %  0.5 % (0.0-2.0)   18  04:45    


 


Neutrophils (Manual)  75 % (40-80)   18  04:45    


 


Lymphocytes  18 % (20-50)  L  18  04:45    


 


Monocytes  3 % (2-10)   18  04:45    


 


Eosinophils  1 % (0-5)   18  04:45    


 


Basophils  1 % (0-3)   18  04:45    


 


Platelet Estimate  ADEQUATE  (NORMAL)   18  04:45    


 


Anisocytosis  1+   18  06:10    


 


PT  10.6 SECONDS (9.5-11.5)   18  04:45    


 


INR  1.02  (0.5-1.4)   18  04:45    


 


PTT (Actin FS)  22.1 SECONDS (26.0-38.0)  L  18  19:40    


 


Specimen Source  Arterial   18  07:35    


 


Sample Site  RB   18  07:35    


 


pH  7.46  (7.35-7.45)  H  18  07:35    


 


pCO2  34.0 mmHg (35.0-45.0)  L  18  07:35    


 


pO2  62.0 mmHg (80.0-100.0)  L  18  07:35    


 


HCO3  25.5 mEq/L (20.0-26.0)   18  07:35    


 


Base Excess  0.8 mEq/L (-3.0-3.0)   18  07:35    


 


O2 Saturation  93.0 % (92.0-100.0)   18  07:35    


 


Boubacar Test  N/A   18  07:35    


 


Vent Rate  18   18  07:35    


 


Inspired O2  28   18  07:35    


 


Tidal Volume  450   18  07:35    


 


PEEP  5   18  07:35    


 


Pressure (ins/psv/peep)  N/A   18  07:35    


 


Critical Value  DM   18  07:35    


 


Sodium  146 mEq/L (136-145)  H  18  04:45    


 


Potassium  4.1 mEq/L (3.5-5.1)   18  04:45    


 


Chloride  112 mEq/L ()  H  18  04:45    


 


Carbon Dioxide  23.8 mEq/L (21.0-31.0)   18  04:45    


 


Anion Gap  14.3  (7.0-16.0)   18  04:45    


 


BUN  37 mg/dL (7-25)  H  18  04:45    


 


Creatinine  1.7 mg/dL (0.6-1.2)  H  18  04:45    


 


Est GFR ( Amer)  TNP   18  04:45    


 


Est GFR (Non-Af Amer)  TNP   18  04:45    


 


BUN/Creatinine Ratio  21.8   18  04:45    


 


Glucose  399 mg/dL ()  H  18  04:45    


 


POC Glucose  379 MG/DL (70 - 105)  H  18  06:41    


 


Whole Bld Lactic Acid  2.46 mmol/L (0.60-1.99)  H*  18  10:07    


 


Calcium  8.0 mg/dL (8.6-10.3)  L  18  04:45    


 


Phosphorus  2.8 mg/dL (2.5-5.0)   18  04:35    


 


Magnesium  2.2 mg/dL (1.9-2.7)   18  04:45    


 


Total Bilirubin  0.4 mg/dL (0.3-1.0)   18  07:42    


 


AST  11 U/L (13-39)  L  18  07:42    


 


ALT  9 U/L (7-52)   18  07:42    


 


Alkaline Phosphatase  70 U/L ()   18  07:42    


 


B-Natriuretic Peptide  1390.0 pg/mL (5.0-100.0)  H  18  04:45    


 


Total Protein  4.4 gm/dL (6.0-8.3)  L  18  07:42    


 


Albumin  2.3 gm/dL (3.7-5.3)  L  18  07:42    


 


Globulin  2.1 gm/dL  18  07:42    


 


Albumin/Globulin Ratio  1.1  (1.0-1.8)   18  07:42    


 


Triglycerides  126 mg/dL (<150)   18  04:10    


 


Cholesterol  124 mg/dL (<200)   18  04:10    


 


LDL Cholesterol Direct  84 mg/dL ()   18  04:10    


 


HDL Cholesterol  30 mg/dL (23-92)   18  04:10    


 


TSH  0.75 uIU/ml (0.34-5.60)   18  04:10    


 


Vancomycin Trough  15.3 ug/mL (5-10)  H  18  09:30    


 


Helicobacter pylori Ab  NEGATIVE  (NEGATIVE)   18  08:35    


 


Blood Type  O POSITIVE   18  19:40    


 


Antibody Screen  NEGATIVE   18  19:40    


 


Crossmatch  See Detail   18  19:40    














- Physical Exam


Vitals and I&O: 


 Vital Signs











Temp  98.5 F   18 08:00


 


Pulse  95   18 10:00


 


Resp  26   18 10:00


 


BP  183/76   18 10:00


 


Pulse Ox  96   18 10:00








 Intake & Output











 18





 18:59 06:59 18:59


 


Intake Total 1050 950 


 


Output Total 1400 750 


 


Balance -350 200 


 


Weight (lbs) 78.562 kg 74.843 kg 


 


Intake:   


 


  Intake, IV Amount 350 350 


 


    Azithromycin 500 mg In  250 





    Sodium Chloride 0.9% 250   





    ml @ 250 mls/hr IV Q24HR   





    [facility] Rx#:[id number]   


 


    Piperacillin Sodium/ 100 100 





    Tazobact 2.25 gm In   





    Sodium Chloride 0.9% 50   





    ml @ 100 mls/hr IV Q6H   





    [facility] Rx#:[id number]   


 


    Vancomycin HCl 0.75 gm In 250  





    Sodium Chloride 0.9% 250   





    ml @ 165 mls/hr IV Q24H   





    [facility] Rx#:[id number]   


 


  Oral  0 


 


  Tube Feeding 500 500 


 


  Other 200 100 


 


Output:   


 


  Urine 1400 750 


 


Other:   


 


  # Bowel Movements  0 


 


  Weight Source Bedscale Bedscale 











Active Medications: 


Current Medications





Acetaminophen (Tylenol)  650 mg PO Q4HR PRN


   PRN Reason: MILD PAIN 1-3


   Stop: 19 17:58


Acetaminophen (Tylenol)  650 mg PO Q4HR PRN


   PRN Reason: TEMP >100.4


   Stop: 19 17:58


Acetylcysteine (Mucomyst 20%)  3 ml HHN Q4HRT [facility]


   Stop: 19 14:59


   Last Admin: 18 07:37 Dose:  3 ml


Albuterol/Ipratropium (Duoneb Neb)  3 ml HHN Q4HRT [facility]


   Stop: 19 14:59


   Last Admin: 18 07:36 Dose:  3 ml


Amlodipine Besylate (Norvasc)  5 mg PO DAILY [facility]


   Stop: 19 08:59


   Last Admin: 18 08:23 Dose:  5 mg


Ascorbic Acid (Vitamin C)  500 mg PO DAILY [facility]


   Stop: 19 08:59


   Last Admin: 18 08:23 Dose:  500 mg


Atorvastatin Calcium (Lipitor)  10 mg PO HS [facility]; Protocol


   Stop: 19 20:59


Bisacodyl (Dulcolax 10 Mg Supp)  10 mg RC DAILY PRN


   PRN Reason: IF MOM INEFFECTIVE


   Stop: 19 17:58


Chlorhexidine Gluconate (Peridex)  15 ml MM 0800, [facility]


   Stop: 19 19:59


   Last Admin: 18 08:35 Dose:  15 ml


Azithromycin 500 mg/ Sodium (Chloride)  250 mls @ 250 mls/hr IV Q24HR [facility]


   Stop: 19 22:29


   Last Infusion: 18 23:23 Dose:  Infused


Diltiazem HCl 125 mg/ Dextrose  100 mls @ 0 mls/hr IV Q8H PRN; Protocol


   PRN Reason: blood pressure


   Stop: 19 07:48


   Last Admin: 18 09:00 Dose:  6.25 mg/hr, 5 mls/hr


Piperacillin Sod/Tazobactam (Sod 2.25 gm/ Sodium Chloride)  50 mls @ 100 mls/hr 

IV Q6H [facility]


   Stop: 19 13:59


   Last Admin: 18 08:26 Dose:  100 mls/hr


Vancomycin HCl 0.75 gm/ Sodium (Chloride)  250 mls @ 165 mls/hr IV Q24H [facility]


   Stop: 19 09:59


   Last Admin: 18 10:50 Dose:  165 mls/hr


Insulin Aspart (Novolog Insulin Sliding Scale)  0 units SUBQ Q6HR [facility]; Protocol


   Stop: 19 01:14


   Last Admin: 18 06:55 Dose:  11 units


Lactobacillus Rhamnosus (Culturelle 15b)  1 each PO DAILY [facility]


   Stop: 19 08:59


   Last Admin: 18 08:24 Dose:  1 each


Lorazepam (Ativan)  1 mg IVP Q4HR PRN; Protocol


   PRN Reason: Agitation


   Stop: 19 07:12


   Last Admin: 18 08:29 Dose:  1 mg


Miscellaneous (Probiotic Screen)  1 ea  PRN PRN


   PRN Reason: PROTOCOL


   Stop: 19 15:29


Miscellaneous (Vancomycin Iv Per Pharmacy)  1 ea [facility] PRN [facility]


   Stop: 19 23:44


Miscellaneous (Zosyn Iv Per Pharmacy)  1 ea  PRN PRN


   PRN Reason: PROTOCOL


   Stop: 19 12:03


Morphine Sulfate (Morphine)  1 mg IVP Q4HR PRN


   PRN Reason: Pain (Moderate)


   Stop: 19 10:04


   Last Admin: 18 08:24 Dose:  1 mg


Mupirocin (Bactroban Oint)  1 appl NS BID [facility]


   Stop: 18 17:01


   Last Admin: 18 08:39 Dose:  1 appl


Pantoprazole Sodium (Protonix)  40 mg IVP BID [facility]


   Stop: 19 08:59


   Last Admin: 18 08:23 Dose:  40 mg


Polyethylene Glycol (Miralax)  17 gm PO BID [facility]


   Stop: 19 08:59


   Last Admin: 18 08:24 Dose:  17 gm








General: no acute distress, well developed, well nourished


HEENT: atraumatic, normocephalic, PERRLA, EOMI


Neck: supple, no thyromegaly


Cardiovascular: S1S2, regular


Lungs: clear to auscultation bilaterally, clear to percussion


Abdomen: soft, no tender, no distended


Extremities: no cyanosis, no clubbing, no edema


Neurological: awake, alert, oriented


Skin: intact





Infectious Disease Assmt/Plan





- Problem List


Patient Problems: 


All Active Problems





COFFEE GROUND EMESIS (Acute) 











- Assessment


Assessment: 





1.  Severe sepsis.


2.  Pneumonia.


3.  Fecal impaction.


4.  Gallbladder stone at bladder neck.


5.  Diabetes mellitus type 2.


6.  Hypertension.


7.  Dementia.


8.  Anemia.


9.  Large hiatal hernia.


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 


11. GI bleed 2/2 PUD.


12. SVT


12. VDRF.








- Plan


Plan: 





Continue zosyn.


Continue vancomycin IV


Bactroban nasal ointment.





Nutritional Asmnt/Malnutr-PDOC





- Dietary Evaluation


Malnutrition Findings (Please click <Entered> for more info): 








Nutritional Asmnt/Malnutrition                             Start:  18 15:

21


Text:                                                      Status: Complete    

  


Freq:                                                                          

  


Protocol:                                                                      

  


 Document     18 15:22  [facility]  (Rec: 18 15:44  [facility] [id number])


 Nutritional Asmnt/Malnutrition


     Patient General Information


      Nutritional Screening                      High Risk


      Diagnosis                                  sepsis, dehydration, pneumonia


      Pertinent Medical Hx/Surgical Hx           HTN, DM, dyslipidemia,


                                                 dementia, anemia, muscle


                                                 weakness


      Subjective Information                     Pt receiving care from RN at


                                                 time of visit. Per nurse notes


                                                 , pt had coffee ground emesis,


                                                 NGT was unable to be inserted


                                                 d/t large retro cardiac


                                                 hiatal hernia, and will have


                                                 EGD tomorrow. Spoke w/ LEVON Lebron by phone who verfied


                                                 the nurse notes and states pt


                                                 will continue to be NPO.


      Current Diet Order/ Nutrition Support      NPO


      Pertinent Medications                      D5-0.45ns, novolog, culturelle


                                                 , pantoprazole


      Pertinent Labs                             : glucose 392, Na 144, Cl


                                                 110, BUN 78, Alb 2.9, 


                                                 -423


                                                 : glucose 547, Na 134, Cl


                                                 97, BUN 69, Alb 3.0, -


                                                 485


     Nutritional Hx/Data


      Height                                     1.63 m


      Height (Calculated Centimeters)            162.6


      Current Weight (lbs)                       64.682 kg


      Weight (Calculated Kilograms)              64.7


      Weight (Calculated Grams)                  58900.3


      Ideal Body Weight                          120 lb


      Body Mass Index (BMI)                      24.5


      Weight Status                              Approriate


     GI Symptoms


      GI Symptoms                                Nausea


                                                 Vomitting


      Last BM                                    none noted


      Difficult in:                              None


      Food Allergies                             No


      Skin Integrity/Comment:                    intact, angelica 13


      Current %PO                                Negligible < 25%


     Estimated Nutritional Goals


      BEE in Kcals:                              Using Current wt


      Calories/Kcals/Kg                          30-35


      Kcals Calculated                           3618-8136


      Protein:                                   Using Current wt


      Protein g/k.2-1.5


      Protein Calculated                         78-97 g


      Fluid: ml                                  per MD


     Nutritional Problem


      2. Problem


       Problem                                   Altered nutrition related lab


                                                 values


       Etiology                                  dehydration, endocrine


                                                 dysfunction


       Signs/Symptoms:                           Cl 110, BUN 78, glucose 392,


                                                 -423


      1. Problem


       Problem                                   Increased nutrient needs


       Etiology                                  metabolic stress


       Signs/Symptoms:                           dx of sepsis and pneumonia


     Malnutrition Related to Morbid Obesity


      Malnutrition related to morbid obesity     No


     Intervention/Recommendation


      Comments                                   1. Monitor NPO status and


                                                 advance diet when medically


                                                 appropriate


                                                 2. Consider alternative


                                                 nutrition route if pt unable


                                                 to tolerate PO intake


                                                 3. Monitor wt, nutrition


                                                 related labs, and skin


                                                 integrity


                                                 4. F/U as high risk in 2-3


                                                 days, -


     Expected Outcomes/Goals


      Expected Outcomes/Goals                    1. Pt to start oral intake or


                                                 alternate nutrition route if


                                                 necessary


                                                 2. Wt stability, skin to


                                                 remain intact, nutrition


                                                 related labs to approach


                                                 normal limits


                                                 Reveiwed by Lisa Haas RD

## 2018-11-27 LAB
ANION GAP SERPL CALC-SCNC: 14.1 MMOL/L (ref 7–16)
ARTERIAL PATENCY WRIST A: (no result)
BASOPHILS # BLD AUTO: 0.1 TH/CUMM (ref 0–0.2)
BASOPHILS NFR BLD AUTO: 0.5 % (ref 0–2)
BUN SERPL-MCNC: 34 MG/DL (ref 7–25)
CALCIUM SERPL-MCNC: 8.4 MG/DL (ref 8.6–10.3)
CHLORIDE SERPL-SCNC: 110 MEQ/L (ref 98–107)
CO2 SERPL-SCNC: 26.4 MEQ/L (ref 21–31)
CREAT SERPL-MCNC: 1.7 MG/DL (ref 0.6–1.2)
EOSINOPHIL # BLD AUTO: 0.5 TH/CMM (ref 0.1–0.4)
EOSINOPHIL NFR BLD AUTO: 3.8 % (ref 0–5)
ERYTHROCYTE [DISTWIDTH] IN BLOOD BY AUTOMATED COUNT: 13.7 % (ref 11.5–20)
GLUCOSE SERPL-MCNC: 172 MG/DL (ref 70–105)
HCT VFR BLD CALC: 25.3 % (ref 41–60)
HGB BLD-MCNC: 8.5 GM/DL (ref 12–16)
LYMPHOCYTE AB SER FC-ACNC: 1.9 TH/CMM (ref 1.5–3)
LYMPHOCYTES NFR BLD AUTO: 13.7 % (ref 20–50)
MCH RBC QN AUTO: 29.4 PG (ref 27–31)
MCHC RBC AUTO-ENTMCNC: 33.7 PG (ref 28–36)
MCV RBC AUTO: 87.3 FL (ref 81–100)
MONOCYTES # BLD AUTO: 1 TH/CMM (ref 0.3–1)
MONOCYTES NFR BLD AUTO: 7 % (ref 2–10)
NEUTROPHILS # BLD: 10.2 TH/CMM (ref 1.8–8)
NEUTROPHILS NFR BLD AUTO: 75 % (ref 40–80)
PCO2 BLDA: 35 MMHG (ref 35–45)
PCO2 BLDA: 43 MMHG (ref 35–45)
PLATELET # BLD: 196 TH/CMM (ref 150–400)
PMV BLD AUTO: 9 FL
PO2 BLDA: 78 MMHG (ref 80–100)
PO2 BLDA: 81 MMHG (ref 80–100)
POTASSIUM SERPL-SCNC: 3.5 MEQ/L (ref 3.5–5.1)
RBC # BLD AUTO: 2.89 MIL/CMM (ref 3.8–5.2)
SAO2 % BLDA: 96 % (ref 92–100)
SAO2 % BLDA: 97 % (ref 92–100)
SODIUM SERPL-SCNC: 147 MEQ/L (ref 136–145)
WBC # BLD AUTO: 13.7 TH/CMM (ref 4.8–10.8)

## 2018-11-27 PROCEDURE — 0DH68UZ INSERTION OF FEEDING DEVICE INTO STOMACH, VIA NATURAL OR ARTIFICIAL OPENING ENDOSCOPIC: ICD-10-PCS

## 2018-11-27 RX ADMIN — IPRATROPIUM BROMIDE AND ALBUTEROL SULFATE SCH ML: .5; 3 SOLUTION RESPIRATORY (INHALATION) at 07:09

## 2018-11-27 RX ADMIN — INSULIN ASPART SCH UNITS: 100 INJECTION, SOLUTION INTRAVENOUS; SUBCUTANEOUS at 12:23

## 2018-11-27 RX ADMIN — CHLORHEXIDINE GLUCONATE SCH ML: 1.2 RINSE ORAL at 08:10

## 2018-11-27 RX ADMIN — SODIUM CHLORIDE SCH MLS/HR: 9 INJECTION, SOLUTION INTRAVENOUS at 14:17

## 2018-11-27 RX ADMIN — IPRATROPIUM BROMIDE AND ALBUTEROL SULFATE SCH ML: .5; 3 SOLUTION RESPIRATORY (INHALATION) at 14:20

## 2018-11-27 RX ADMIN — INSULIN ASPART SCH UNITS: 100 INJECTION, SOLUTION INTRAVENOUS; SUBCUTANEOUS at 07:47

## 2018-11-27 RX ADMIN — Medication SCH: at 09:00

## 2018-11-27 RX ADMIN — IPRATROPIUM BROMIDE AND ALBUTEROL SULFATE SCH ML: .5; 3 SOLUTION RESPIRATORY (INHALATION) at 19:03

## 2018-11-27 RX ADMIN — MORPHINE SULFATE PRN MG: 2 INJECTION, SOLUTION INTRAMUSCULAR; INTRAVENOUS at 02:07

## 2018-11-27 RX ADMIN — SODIUM CHLORIDE SCH MLS/HR: 9 INJECTION, SOLUTION INTRAVENOUS at 08:04

## 2018-11-27 RX ADMIN — SODIUM CHLORIDE SCH MLS/HR: 9 INJECTION, SOLUTION INTRAVENOUS at 02:07

## 2018-11-27 RX ADMIN — POLYETHYLENE GLYCOL 3350 SCH: 17 POWDER, FOR SOLUTION ORAL at 09:00

## 2018-11-27 RX ADMIN — IPRATROPIUM BROMIDE AND ALBUTEROL SULFATE SCH ML: .5; 3 SOLUTION RESPIRATORY (INHALATION) at 23:06

## 2018-11-27 RX ADMIN — INSULIN ASPART SCH UNITS: 100 INJECTION, SOLUTION INTRAVENOUS; SUBCUTANEOUS at 00:17

## 2018-11-27 RX ADMIN — MORPHINE SULFATE PRN MG: 2 INJECTION, SOLUTION INTRAMUSCULAR; INTRAVENOUS at 17:13

## 2018-11-27 RX ADMIN — DEXTROSE AND SODIUM CHLORIDE SCH MLS/HR: 5; .45 INJECTION, SOLUTION INTRAVENOUS at 00:15

## 2018-11-27 RX ADMIN — INSULIN ASPART SCH: 100 INJECTION, SOLUTION INTRAVENOUS; SUBCUTANEOUS at 19:03

## 2018-11-27 RX ADMIN — IPRATROPIUM BROMIDE AND ALBUTEROL SULFATE SCH ML: .5; 3 SOLUTION RESPIRATORY (INHALATION) at 11:11

## 2018-11-27 RX ADMIN — SODIUM CHLORIDE SCH MLS/HR: 9 INJECTION, SOLUTION INTRAVENOUS at 20:25

## 2018-11-27 RX ADMIN — POLYETHYLENE GLYCOL 3350 SCH GM: 17 POWDER, FOR SOLUTION ORAL at 17:14

## 2018-11-27 RX ADMIN — IPRATROPIUM BROMIDE AND ALBUTEROL SULFATE SCH ML: .5; 3 SOLUTION RESPIRATORY (INHALATION) at 03:30

## 2018-11-27 NOTE — DIAGNOSTIC IMAGING REPORT
CHEST X-RAY: AP view



INDICATION: Shortness of breath



COMPARISON: 11/26/2018



FINDINGS: Support devices are stable.  Cardiomegaly is noted with

atherosclerosis.  Small effusions are noted.



IMPRESSION:



Small bilateral effusions.  Faint infiltrates of the lung bases cannot

be excluded.



Cardiomegaly and atherosclerotic vascular disease.

## 2018-11-27 NOTE — INTERNAL MEDICINE PROG NOTE
Internal Medicine Subjective





- Subjective


Service Date: 18 (s/p extubation)


Patient is:: awake, other


Per staff patient has:: no adverse event, tolerating meds





Internal Medicine Objective





- Results


Result Diagrams: 


 18 04:20





 18 04:20


Recent Labs: 


 Laboratory Last Values











WBC  13.7 Th/cmm (4.8-10.8)  H  18  04:20    


 


RBC  2.89 Mil/cmm (3.80-5.20)  L  18  04:20    


 


Hgb  8.5 gm/dL (12-16)  L  18  04:20    


 


Hct  25.3 % (41.0-60)  L  18  04:20    


 


MCV  87.3 fl ()   18  04:20    


 


MCH  29.4 pg (27.0-31.0)   18  04:20    


 


MCHC Differential  33.7 pg (28.0-36.0)   18  04:20    


 


RDW  13.7 % (11.5-20.0)   18  04:20    


 


Plt Count  196 Th/cmm (150-400)   18  04:20    


 


MPV  9.0 fl  18  04:20    


 


Add Manual Diff  YES   18  06:10    


 


Neutrophils %  75.0 % (40.0-80.0)   18  04:20    


 


Band Neutrophils %  3 % (0-10)   18  04:45    


 


Lymphocytes %  13.7 % (20.0-50.0)  L  18  04:20    


 


Monocytes %  7.0 % (2.0-10.0)   18  04:20    


 


Eosinophils %  3.8 % (0.0-5.0)   18  04:20    


 


Basophils %  0.5 % (0.0-2.0)   18  04:20    


 


Neutrophils (Manual)  75 % (40-80)   18  04:45    


 


Lymphocytes  18 % (20-50)  L  18  04:45    


 


Monocytes  3 % (2-10)   18  04:45    


 


Eosinophils  1 % (0-5)   18  04:45    


 


Basophils  1 % (0-3)   18  04:45    


 


Platelet Estimate  ADEQUATE  (NORMAL)   18  04:45    


 


Anisocytosis  1+   18  06:10    


 


PT  10.6 SECONDS (9.5-11.5)   18  04:45    


 


INR  1.02  (0.5-1.4)   18  04:45    


 


PTT (Actin FS)  22.1 SECONDS (26.0-38.0)  L  18  19:40    


 


Specimen Source  arterial   18  11:45    


 


Sample Site  rr   18  11:45    


 


pH  7.46  (7.35-7.45)  H  18  11:45    


 


pCO2  43.0 mmHg (35.0-45.0)   18  11:45    


 


pO2  81.0 mmHg (80.0-100.0)   18  11:45    


 


HCO3  29.7 mEq/L (20.0-26.0)  H  18  11:45    


 


Base Excess  6.1 mEq/L (-3.0-3.0)  H  18  11:45    


 


O2 Saturation  96.0 % (92.0-100.0)   18  11:45    


 


Boubacar Test  pos   18  11:45    


 


Vent Rate  na   18  11:45    


 


Inspired O2  40   18  11:45    


 


Tidal Volume  na   18  11:45    


 


PEEP  na   18  11:45    


 


Pressure (ins/psv/peep)  na   18  11:45    


 


Critical Value  rninofranco rcp   18  11:45    


 


Sodium  147 mEq/L (136-145)  H  18  04:20    


 


Potassium  3.5 mEq/L (3.5-5.1)   18  04:20    


 


Chloride  110 mEq/L ()  H  18  04:20    


 


Carbon Dioxide  26.4 mEq/L (21.0-31.0)   18  04:20    


 


Anion Gap  14.1  (7.0-16.0)   18  04:20    


 


BUN  34 mg/dL (7-25)  H  18  04:20    


 


Creatinine  1.7 mg/dL (0.6-1.2)  H  18  04:20    


 


Est GFR ( Amer)  TNP   18  04:20    


 


Est GFR (Non-Af Amer)  TNP   18  04:20    


 


BUN/Creatinine Ratio  20.0   18  04:20    


 


Glucose  172 mg/dL ()  H D 18  04:20    


 


POC Glucose  224 MG/DL (70 - 105)  H  18  11:47    


 


Whole Bld Lactic Acid  2.46 mmol/L (0.60-1.99)  H*  18  10:07    


 


Calcium  8.4 mg/dL (8.6-10.3)  L  18  04:20    


 


Phosphorus  2.8 mg/dL (2.5-5.0)   18  04:35    


 


Magnesium  2.2 mg/dL (1.9-2.7)   18  04:45    


 


Total Bilirubin  0.4 mg/dL (0.3-1.0)   18  07:42    


 


AST  11 U/L (13-39)  L  18  07:42    


 


ALT  9 U/L (7-52)   18  07:42    


 


Alkaline Phosphatase  70 U/L ()   18  07:42    


 


B-Natriuretic Peptide  1920.0 pg/mL (5.0-100.0)  H  18  04:20    


 


Total Protein  4.4 gm/dL (6.0-8.3)  L  18  07:42    


 


Albumin  2.3 gm/dL (3.7-5.3)  L  18  07:42    


 


Globulin  2.1 gm/dL  18  07:42    


 


Albumin/Globulin Ratio  1.1  (1.0-1.8)   18  07:42    


 


Triglycerides  126 mg/dL (<150)   18  04:10    


 


Cholesterol  124 mg/dL (<200)   18  04:10    


 


LDL Cholesterol Direct  84 mg/dL ()   18  04:10    


 


HDL Cholesterol  30 mg/dL (23-92)   18  04:10    


 


TSH  0.75 uIU/ml (0.34-5.60)   18  04:10    


 


Vancomycin Trough  15.3 ug/mL (5-10)  H  18  09:30    


 


Helicobacter pylori Ab  NEGATIVE  (NEGATIVE)   18  08:35    


 


Blood Type  O POSITIVE   18  19:40    


 


Antibody Screen  NEGATIVE   18  19:40    


 


Crossmatch  See Detail   18  19:40    














- Physical Exam


Vitals and I&O: 


 Vital Signs











Temp  97.5 F   18 12:00


 


Pulse  88   18 13:17


 


Resp  26   18 13:00


 


BP  181/62   18 13:17


 


Pulse Ox  96   18 13:00








 Intake & Output











 18





 18:59 06:59 18:59


 


Intake Total 830 682.667 300


 


Output Total 1550 900 


 


Balance -720 -217.333 300


 


Weight (lbs) 165 lb 166 lb 11.2 oz 


 


Intake:   


 


  Intake, IV Amount 350 362.667 300


 


    D5-0.45NS 1,000 ml @ 40  262.667 





    mls/hr IV .Q24H [facility] Rx#:   





    [id number]   


 


    Piperacillin Sodium/ 100 100 50





    Tazobact 2.25 gm In   





    Sodium Chloride 0.9% 50   





    ml @ 100 mls/hr IV Q6H   





    [facility] Rx#:[id number]   


 


    Vancomycin HCl 0.75 gm In 250  





    Sodium Chloride 0.9% 250   





    ml @ 165 mls/hr IV Q24H   





    [facility] Rx#:[id number]   


 


    Vancomycin HCl 1.25 gm In   250





    Sodium Chloride 0.9% 250   





    ml @ 165 mls/hr IV Q36H   





    [facility] Rx#:[id number]   


 


  Oral  120 


 


  Tube Feeding 360 200 


 


  Other 120  


 


Output:   


 


  Urine 1550 900 


 


Other:   


 


  # Bowel Movements 0 1 


 


  Stool Characteristics   Soft





   Formed


 


  Weight Source Bedscale Bedscale 











Active Medications: 


Current Medications





Acetaminophen (Tylenol)  650 mg PO Q4HR PRN


   PRN Reason: MILD PAIN 1-3


   Stop: 19 17:58


Acetaminophen (Tylenol)  650 mg PO Q4HR PRN


   PRN Reason: TEMP >100.4


   Stop: 19 17:58


Acetylcysteine (Mucomyst 20%)  3 ml HHN Q4HRT [facility]


   Stop: 19 14:59


   Last Admin: 18 11:11 Dose:  3 ml


Albuterol/Ipratropium (Duoneb Neb)  3 ml HHN Q4HRT [facility]


   Stop: 19 14:59


   Last Admin: 18 11:11 Dose:  3 ml


Amlodipine Besylate (Norvasc)  5 mg PO DAILY [facility]


   Stop: 19 08:59


   Last Admin: 18 13:17 Dose:  5 mg


Ascorbic Acid (Vitamin C)  500 mg PO DAILY [facility]


   Stop: 19 08:59


   Last Admin: 18 09:00 Dose:  Not Given


Atorvastatin Calcium (Lipitor)  10 mg PO HS [facility]; Protocol


   Stop: 19 20:59


   Last Admin: 18 20:22 Dose:  10 mg


Bisacodyl (Dulcolax 10 Mg Supp)  10 mg RC DAILY PRN


   PRN Reason: IF MOM INEFFECTIVE


   Stop: 19 17:58


Chlorhexidine Gluconate (Peridex)  15 ml MM 0800,2000 [facility]


   Stop: 19 19:59


   Last Admin: 18 08:10 Dose:  15 ml


Furosemide (Lasix)  40 mg IVP DAILY [facility]


   Stop: 19 08:59


   Last Admin: 18 08:04 Dose:  40 mg


Diltiazem HCl 125 mg/ Dextrose  100 mls @ 0 mls/hr IV Q8H [facility]; Protocol


   PRN Reason: blood pressure


   Stop: 19 07:48


   Last Admin: 18 09:00 Dose:  6.25 mg/hr, 5 mls/hr


Piperacillin Sod/Tazobactam (Sod 2.25 gm/ Sodium Chloride)  50 mls @ 100 mls/hr 

IV Q6H [facility]


   Stop: 19 13:59


   Last Infusion: 18 08:35 Dose:  Infused


Dextrose/Sodium Chloride (D5-0.45ns)  1,000 mls @ 40 mls/hr IV .Q24H [facility]


   Stop: 19 00:00


   Last Infusion: 18 06:49 Dose:  40 mls/hr


Vancomycin HCl 1.25 gm/ Sodium (Chloride)  250 mls @ 165 mls/hr IV Q36H [facility]


   Stop: 19 08:59


   Last Infusion: 18 11:50 Dose:  Infused


Insulin Aspart (Novolog Insulin Sliding Scale)  0 units SUBQ Q6HR [facility]; Protocol


   Stop: 19 01:14


   Last Admin: 18 12:23 Dose:  5 units


Lactobacillus Rhamnosus (Culturelle 15b)  1 each PO DAILY [facility]


   Stop: 19 08:59


   Last Admin: 18 09:00 Dose:  Not Given


Lorazepam (Ativan)  1 mg IVP Q4HR PRN; Protocol


   PRN Reason: Agitation


   Stop: 19 07:12


   Last Admin: 18 08:29 Dose:  1 mg


Miscellaneous (Probiotic Screen)  1 ea  PRN PRN


   PRN Reason: PROTOCOL


   Stop: 19 15:29


Miscellaneous (Vancomycin Iv Per Pharmacy)  1 ea  PRN [facility]


   Stop: 19 23:44


Miscellaneous (Zosyn Iv Per Pharmacy)  1 [facility] PRN PRN


   PRN Reason: PROTOCOL


   Stop: 19 12:03


Morphine Sulfate (Morphine)  1 mg IVP Q4HR PRN


   PRN Reason: Pain (Moderate)


   Stop: 19 10:04


   Last Admin: 18 02:07 Dose:  1 mg


Pantoprazole Sodium (Protonix)  40 mg IVP BID [facility]


   Stop: 19 08:59


   Last Admin: 18 08:04 Dose:  40 mg


Polyethylene Glycol (Miralax)  17 gm PO BID [facility]


   Stop: 19 08:59


   Last Admin: 18 09:00 Dose:  Not Given








General: weak


HEENT: NC/AT


Neck: Supple


Lungs: CTAB


Cardiovascular: Normal S1, Normal S2


Abdomen: non-tender


Extremities: clear, edema


Neurological: no change





- Procedures


Procedures: 


 Procedures











Procedure Code Date


 


EXCISION OF STOMACH, PYLORUS, ENDO, DIAGN [id number] 18


 


INSERTION OF FEEDING DEVICE INTO STOMACH, ENDO [id number]


 


RESPIRATORY VENTILATION, GREATER THAN 96 CONSECUTIVE HOURS [id number] 18














Internal Medicine Assmt/Plan





- Assessment


Assessment: 


s/p extubation 


 Severe sepsis.


Pneumonia.


Fecal impaction.


Gallbladder stone at bladder neck.


Diabetes mellitus type 2.


Hypertension.


Dementia.


Anemia.


Large hiatal hernia.








-








- Plan


Plan: 





monitor respiratory effort


follow up labs in am


LTAC eval 


cpm 





Nutritional Asmnt/Malnutr-PDOC





- Dietary Evaluation


Malnutrition Findings (Please click <Entered> for more info): 








Nutritional Asmnt/Malnutrition                             Start:  18 15:

21


Text:                                                      Status: Complete    

  


Freq:                                                                          

  


Protocol:                                                                      

  


 Document     18 15:22  [place]  (Rec: 18 15:44  [facility]  [email or web address])


 Nutritional Asmnt/Malnutrition


     Patient General Information


      Nutritional Screening                      High Risk


      Diagnosis                                  sepsis, dehydration, pneumonia


      Pertinent Medical Hx/Surgical Hx           HTN, DM, dyslipidemia,


                                                 dementia, anemia, muscle


                                                 weakness


      Subjective Information                     Pt receiving care from RN at


                                                 time of visit. Per nurse notes


                                                 , pt had coffee ground emesis,


                                                 NGT was unable to be inserted


                                                 d/t large retro cardiac


                                                 hiatal hernia, and will have


                                                 EGD tomorrow. Spoke w/ LEVON Lebron by phone who verfied


                                                 the nurse notes and states pt


                                                 will continue to be NPO.


      Current Diet Order/ Nutrition Support      NPO


      Pertinent Medications                      D5-0.45ns, novolog, culturelle


                                                 , pantoprazole


      Pertinent Labs                             : glucose 392, Na 144, Cl


                                                 110, BUN 78, Alb 2.9, 


                                                 -423


                                                 : glucose 547, Na 134, Cl


                                                 97, BUN 69, Alb 3.0, -


                                                 485


     Nutritional Hx/Data


      Height                                     5 ft 4 in


      Height (Calculated Centimeters)            162.6


      Current Weight (lbs)                       142 lb 9.6 oz


      Weight (Calculated Kilograms)              64.7


      Weight (Calculated Grams)                  94956.3


      Ideal Body Weight                          120 lb


      Body Mass Index (BMI)                      24.5


      Weight Status                              Approriate


     GI Symptoms


      GI Symptoms                                Nausea


                                                 Vomitting


      Last BM                                    none noted


      Difficult in:                              None


      Food Allergies                             No


      Skin Integrity/Comment:                    angelica bee 13


      Current %PO                                Negligible < 25%


     Estimated Nutritional Goals


      BEE in Kcals:                              Using Current wt


      Calories/Kcals/Kg                          30-35


      Kcals Calculated                           [id number]


      Protein:                                   Using Current wt


      Protein g/k.2-1.5


      Protein Calculated                         78-97 g


      Fluid: ml                                  per MD


     Nutritional Problem


      2. Problem


       Problem                                   Altered nutrition related lab


                                                 values


       Etiology                                  dehydration, endocrine


                                                 dysfunction


       Signs/Symptoms:                           Cl 110, BUN 78, glucose 392,


                                                 -423


      1. Problem


       Problem                                   Increased nutrient needs


       Etiology                                  metabolic stress


       Signs/Symptoms:                           dx of sepsis and pneumonia


     Malnutrition Related to Morbid Obesity


      Malnutrition related to morbid obesity     No


     Intervention/Recommendation


      Comments                                   1. Monitor NPO status and


                                                 advance diet when medically


                                                 appropriate


                                                 2. Consider alternative


                                                 nutrition route if pt unable


                                                 to tolerate PO intake


                                                 3. Monitor wt, nutrition


                                                 related labs, and skin


                                                 integrity


                                                 4. F/U as high risk in 2-3


                                                 days, -


     Expected Outcomes/Goals


      Expected Outcomes/Goals                    1. Pt to start oral intake or


                                                 alternate nutrition route if


                                                 necessary


                                                 2. Wt stability, skin to


                                                 remain intact, nutrition


                                                 related labs to approach


                                                 normal limits


                                                 Reveiwed by Lisa Haas RD

## 2018-11-27 NOTE — INFECTIOUS DISEASE PROG NOTE
Infectious Disease Subjective





- Review of Systems


Service Date: 18


Events since last encounter: 





extubated yday


Subjective: 


 


NO  fever.





Infectious Disease Objective





- Results


Result Diagrams: 


 18 04:45





 18 04:45


Recent Labs: 


 Laboratory Last Values











WBC  13.7 Th/cmm (4.8-10.8)  H  18  04:20    


 


RBC  2.89 Mil/cmm (3.80-5.20)  L  18  04:20    


 


Hgb  8.5 gm/dL (12-16)  L  18  04:20    


 


Hct  25.3 % (41.0-60)  L  18  04:20    


 


MCV  87.3 fl ()   18  04:20    


 


MCH  29.4 pg (27.0-31.0)   18  04:20    


 


MCHC Differential  33.7 pg (28.0-36.0)   18  04:20    


 


RDW  13.7 % (11.5-20.0)   18  04:20    


 


Plt Count  196 Th/cmm (150-400)   18  04:20    


 


MPV  9.0 fl  18  04:20    


 


Add Manual Diff  YES   18  06:10    


 


Neutrophils %  75.0 % (40.0-80.0)   18  04:20    


 


Band Neutrophils %  3 % (0-10)   18  04:45    


 


Lymphocytes %  13.7 % (20.0-50.0)  L  18  04:20    


 


Monocytes %  7.0 % (2.0-10.0)   18  04:20    


 


Eosinophils %  3.8 % (0.0-5.0)   18  04:20    


 


Basophils %  0.5 % (0.0-2.0)   18  04:20    


 


Neutrophils (Manual)  75 % (40-80)   18  04:45    


 


Lymphocytes  18 % (20-50)  L  18  04:45    


 


Monocytes  3 % (2-10)   18  04:45    


 


Eosinophils  1 % (0-5)   18  04:45    


 


Basophils  1 % (0-3)   18  04:45    


 


Platelet Estimate  ADEQUATE  (NORMAL)   18  04:45    


 


Anisocytosis  1+   18  06:10    


 


PT  10.6 SECONDS (9.5-11.5)   18  04:45    


 


INR  1.02  (0.5-1.4)   18  04:45    


 


PTT (Actin FS)  22.1 SECONDS (26.0-38.0)  L  18  19:40    


 


Specimen Source  Arterial   18  08:36    


 


Sample Site  Right Radial   18  08:36    


 


pH  7.53  (7.35-7.45)  H  18  08:36    


 


pCO2  35.0 mmHg (35.0-45.0)   18  08:36    


 


pO2  78.0 mmHg (80.0-100.0)  L  18  08:36    


 


HCO3  29.9 mEq/L (20.0-26.0)  H  18  08:36    


 


Base Excess  6.4 mEq/L (-3.0-3.0)  H  18  08:36    


 


O2 Saturation  97.0 % (92.0-100.0)   18  08:36    


 


Boubacar Test  PASS   18  08:36    


 


Vent Rate  6   18  08:36    


 


Inspired O2  28   18  08:36    


 


Tidal Volume  450   18  08:36    


 


PEEP  5   18  08:36    


 


Pressure (ins/psv/peep)  10   18  08:36    


 


Critical Value  RN   18  08:36    


 


Sodium  147 mEq/L (136-145)  H  18  04:20    


 


Potassium  3.5 mEq/L (3.5-5.1)   18  04:20    


 


Chloride  110 mEq/L ()  H  18  04:20    


 


Carbon Dioxide  26.4 mEq/L (21.0-31.0)   18  04:20    


 


Anion Gap  14.1  (7.0-16.0)   18  04:20    


 


BUN  34 mg/dL (7-25)  H  18  04:20    


 


Creatinine  1.7 mg/dL (0.6-1.2)  H  18  04:20    


 


Est GFR ( Amer)  TNP   18  04:20    


 


Est GFR (Non-Af Amer)  TNP   18  04:20    


 


BUN/Creatinine Ratio  20.0   18  04:20    


 


Glucose  172 mg/dL ()  H D 18  04:20    


 


POC Glucose  194 MG/DL (70 - 105)  H  18  06:37    


 


Whole Bld Lactic Acid  2.46 mmol/L (0.60-1.99)  H*  18  10:07    


 


Calcium  8.4 mg/dL (8.6-10.3)  L  18  04:20    


 


Phosphorus  2.8 mg/dL (2.5-5.0)   18  04:35    


 


Magnesium  2.2 mg/dL (1.9-2.7)   18  04:45    


 


Total Bilirubin  0.4 mg/dL (0.3-1.0)   18  07:42    


 


AST  11 U/L (13-39)  L  18  07:42    


 


ALT  9 U/L (7-52)   18  07:42    


 


Alkaline Phosphatase  70 U/L ()   18  07:42    


 


B-Natriuretic Peptide  1920.0 pg/mL (5.0-100.0)  H  18  04:20    


 


Total Protein  4.4 gm/dL (6.0-8.3)  L  18  07:42    


 


Albumin  2.3 gm/dL (3.7-5.3)  L  18  07:42    


 


Globulin  2.1 gm/dL  18  07:42    


 


Albumin/Globulin Ratio  1.1  (1.0-1.8)   18  07:42    


 


Triglycerides  126 mg/dL (<150)   18  04:10    


 


Cholesterol  124 mg/dL (<200)   18  04:10    


 


LDL Cholesterol Direct  84 mg/dL ()   18  04:10    


 


HDL Cholesterol  30 mg/dL (23-92)   18  04:10    


 


TSH  0.75 uIU/ml (0.34-5.60)   18  04:10    


 


Vancomycin Trough  15.3 ug/mL (5-10)  H  18  09:30    


 


Helicobacter pylori Ab  NEGATIVE  (NEGATIVE)   18  08:35    


 


Blood Type  O POSITIVE   18  19:40    


 


Antibody Screen  NEGATIVE   18  19:40    


 


Crossmatch  See Detail   18  19:40    














- Physical Exam


Vitals and I&O: 


 Vital Signs











Temp  97.5 F   18 10:00


 


Pulse  85   18 11:11


 


Resp  28   18 11:11


 


BP  146/70   18 10:00


 


Pulse Ox  97   18 11:11








 Intake & Output











 18





 18:59 06:59 18:59


 


Intake Total 830 682.667 50


 


Output Total 1550 900 


 


Balance -720 -217.333 50


 


Weight (lbs) 74.843 kg 75.614 kg 


 


Intake:   


 


  Intake, IV Amount 350 362.667 50


 


    D5-0.45NS 1,000 ml @ 40  262.667 





    mls/hr IV .Q24H [facility] Rx#:   





    [id number]   


 


    Piperacillin Sodium/ 100 100 50





    Tazobact 2.25 gm In   





    Sodium Chloride 0.9% 50   





    ml @ 100 mls/hr IV Q6H   





    [facility] Rx#:[id number]   


 


    Vancomycin HCl 0.75 gm In 250  





    Sodium Chloride 0.9% 250   





    ml @ 165 mls/hr IV Q24H   





    [facility] Rx#:[id number]   


 


  Oral  120 


 


  Tube Feeding 360 200 


 


  Other 120  


 


Output:   


 


  Urine 1550 900 


 


Other:   


 


  # Bowel Movements 0 1 


 


  Stool Characteristics   Soft





   Formed


 


  Weight Source Bedscale Bedscale 











Active Medications: 


Current Medications





Acetaminophen (Tylenol)  650 mg PO Q4HR PRN


   PRN Reason: MILD PAIN 1-3


   Stop: 19 17:58


Acetaminophen (Tylenol)  650 mg PO Q4HR PRN


   PRN Reason: TEMP >100.4


   Stop: 19 17:58


Acetylcysteine (Mucomyst 20%)  3 ml HHN Q4HRT [facility]


   Stop: 19 14:59


   Last Admin: 18 11:11 Dose:  3 ml


Albuterol/Ipratropium (Duoneb Neb)  3 ml HHN Q4HRT [facility]


   Stop: 19 14:59


   Last Admin: 18 11:11 Dose:  3 ml


Amlodipine Besylate (Norvasc)  5 mg PO DAILY [facility]


   Stop: 19 08:59


   Last Admin: 18 09:00 Dose:  Not Given


Ascorbic Acid (Vitamin C)  500 mg PO DAILY [facility]


   Stop: 19 08:59


   Last Admin: 18 09:00 Dose:  Not Given


Atorvastatin Calcium (Lipitor)  10 mg PO HS [facility]; Protocol


   Stop: 19 20:59


   Last Admin: 18 20:22 Dose:  10 mg


Bisacodyl (Dulcolax 10 Mg Supp)  10 mg RC DAILY PRN


   PRN Reason: IF MOM INEFFECTIVE


   Stop: 19 17:58


Chlorhexidine Gluconate (Peridex)  15 ml MM 0800, [facility]


   Stop: 19 19:59


   Last Admin: 18 08:10 Dose:  15 ml


Furosemide (Lasix)  40 mg IVP DAILY [facility]


   Stop: 19 08:59


   Last Admin: 18 08:04 Dose:  40 mg


Diltiazem HCl 125 mg/ Dextrose  100 mls @ 0 mls/hr IV Q8H PRN; Protocol


   PRN Reason: blood pressure


   Stop: 19 07:48


   Last Admin: 18 09:00 Dose:  6.25 mg/hr, 5 mls/hr


Piperacillin Sod/Tazobactam (Sod 2.25 gm/ Sodium Chloride)  50 mls @ 100 mls/hr 

IV Q6H [facility]


   Stop: 19 13:59


   Last Infusion: 18 08:35 Dose:  Infused


Dextrose/Sodium Chloride (D5-0.45ns)  1,000 mls @ 40 mls/hr IV .Q24H [facility]


   Stop: 19 00:00


   Last Infusion: 18 06:49 Dose:  40 mls/hr


Vancomycin HCl 1.25 gm/ Sodium (Chloride)  250 mls @ 165 mls/hr IV Q36H [facility]


   Stop: 19 08:59


   Last Admin: 18 10:18 Dose:  165 mls/hr


Insulin Aspart (Novolog Insulin Sliding Scale)  0 units SUBQ Q6HR [facility]; Protocol


   Stop: 19 01:14


   Last Admin: 18 07:47 Dose:  3 units


Lactobacillus Rhamnosus (Culturelle 15b)  1 each PO DAILY [facility]


   Stop: 19 08:59


   Last Admin: 18 09:00 Dose:  Not Given


Lorazepam (Ativan)  1 mg IVP Q4HR PRN; Protocol


   PRN Reason: Agitation


   Stop: 19 07:12


   Last Admin: 18 08:29 Dose:  1 mg


Miscellaneous (Probiotic Screen)  1 ea  PRN PRN


   PRN Reason: PROTOCOL


   Stop: 19 15:29


Miscellaneous (Vancomycin Iv Per Pharmacy)  1 ea  PRN [facility]


   Stop: 19 23:44


Miscellaneous (Zosyn Iv Per Pharmacy)  1 [facility] PRN PRN


   PRN Reason: PROTOCOL


   Stop: 19 12:03


Morphine Sulfate (Morphine)  1 mg IVP Q4HR PRN


   PRN Reason: Pain (Moderate)


   Stop: 19 10:04


   Last Admin: 18 02:07 Dose:  1 mg


Pantoprazole Sodium (Protonix)  40 mg IVP BID [facility]


   Stop: 19 08:59


   Last Admin: 18 08:04 Dose:  40 mg


Polyethylene Glycol (Miralax)  17 gm PO BID [facility]


   Stop: 19 08:59


   Last Admin: 18 09:00 Dose:  Not Given








General: no acute distress, well developed, well nourished


HEENT: atraumatic, normocephalic, PERRLA, EOMI


Neck: supple, no thyromegaly


Cardiovascular: S1S2, regular


Lungs: clear to percussion, crackles, rhonchi


Abdomen: soft, other (G tube.), no tender, no distended, no mass


Extremities: clubbing, no cyanosis, no edema


Skin: intact





- Procedures


Procedures: 


 Procedures











Procedure Code Date


 


EXCISION OF STOMACH, PYLORUS, ENDO, DIAGN [id number] 18


 


INSERTION OF FEEDING DEVICE INTO STOMACH, ENDO [id number] 18


 


RESPIRATORY VENTILATION, GREATER THAN 96 CONSECUTIVE HOURS [id number] 18














Infectious Disease Assmt/Plan





- Problem List


Patient Problems: 


All Active Problems





COFFEE GROUND EMESIS (Acute) 











- Assessment


Assessment: 





1.  Severe sepsis.


2.  Pneumonia.


3.  Fecal impaction.


4.  Gallbladder stone at bladder neck.


5.  Diabetes mellitus type 2.


6.  Hypertension.


7.  Dementia.


8.  Anemia.


9.  Large hiatal hernia.


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 


11. GI bleed 2/2 PUD.


12. SVT


13. VDRF.


14. S/p PEG placement, POD #1.


15. Hypokalemia.





- Plan


Plan: 





Continue zosyn.


Continue vancomycin IV


Bactroban nasal ointment.


K supplementaion.


Will ask for LTAC eval.





Nutritional Asmnt/Malnutr-PDOC





- Dietary Evaluation


Malnutrition Findings (Please click <Entered> for more info): 








Nutritional Asmnt/Malnutrition                             Start:  18 15:

21


Text:                                                      Status: Complete    

  


Freq:                                                                          

  


Protocol:                                                                      

  


 Document     18 15:22  [facility]  (Rec: 18 15:44  [facility]  [email or web address])


 Nutritional Asmnt/Malnutrition


     Patient General Information


      Nutritional Screening                      High Risk


      Diagnosis                                  sepsis, dehydration, pneumonia


      Pertinent Medical Hx/Surgical Hx           HTN, DM, dyslipidemia,


                                                 dementia, anemia, muscle


                                                 weakness


      Subjective Information                     Pt receiving care from RN at


                                                 time of visit. Per nurse notes


                                                 , pt had coffee ground emesis,


                                                 NGT was unable to be inserted


                                                 d/t large retro cardiac


                                                 hiatal hernia, and will have


                                                 EGD tomorrow. Spoke w/ LEVON Lebron by phone who verfied


                                                 the nurse notes and states pt


                                                 will continue to be NPO.


      Current Diet Order/ Nutrition Support      NPO


      Pertinent Medications                      D5-0.45ns, novolog, culturelle


                                                 , pantoprazole


      Pertinent Labs                             : glucose 392, Na 144, Cl


                                                 110, BUN 78, Alb 2.9, 


                                                 -423


                                                 : glucose 547, Na 134, Cl


                                                 97, BUN 69, Alb 3.0, -


                                                 485


     Nutritional Hx/Data


      Height                                     1.63 m


      Height (Calculated Centimeters)            162.6


      Current Weight (lbs)                       64.682 kg


      Weight (Calculated Kilograms)              64.7


      Weight (Calculated Grams)                  00855.3


      Ideal Body Weight                          120 lb


      Body Mass Index (BMI)                      24.5


      Weight Status                              Approriate


     GI Symptoms


      GI Symptoms                                Nausea


                                                 Vomitting


      Last BM                                    none noted


      Difficult in:                              None


      Food Allergies                             No


      Skin Integrity/Comment:                    intact, angelica 13


      Current %PO                                Negligible < 25%


     Estimated Nutritional Goals


      BEE in Kcals:                              Using Current wt


      Calories/Kcals/Kg                          30-35


      Kcals Calculated                           1595-7703


      Protein:                                   Using Current wt


      Protein g/k.2-1.5


      Protein Calculated                         78-97 g


      Fluid: ml                                  per MD


     Nutritional Problem


      2. Problem


       Problem                                   Altered nutrition related lab


                                                 values


       Etiology                                  dehydration, endocrine


                                                 dysfunction


       Signs/Symptoms:                           Cl 110, BUN 78, glucose 392,


                                                 -423


      1. Problem


       Problem                                   Increased nutrient needs


       Etiology                                  metabolic stress


       Signs/Symptoms:                           dx of sepsis and pneumonia


     Malnutrition Related to Morbid Obesity


      Malnutrition related to morbid obesity     No


     Intervention/Recommendation


      Comments                                   1. Monitor NPO status and


                                                 advance diet when medically


                                                 appropriate


                                                 2. Consider alternative


                                                 nutrition route if pt unable


                                                 to tolerate PO intake


                                                 3. Monitor wt, nutrition


                                                 related labs, and skin


                                                 integrity


                                                 4. F/U as high risk in 2-3


                                                 days, -


     Expected Outcomes/Goals


      Expected Outcomes/Goals                    1. Pt to start oral intake or


                                                 alternate nutrition route if


                                                 necessary


                                                 2. Wt stability, skin to


                                                 remain intact, nutrition


                                                 related labs to approach


                                                 normal limits


                                                 Reveiwed by Lisa Haas RD

## 2018-11-27 NOTE — GENERAL PROGRESS NOTE
Subjective





- Review of Systems


Events since last encounter: 





18


PEG placement





Objective





- Results


Result Diagrams: 


 18 04:20





 18 04:20


Recent Labs: 


 Laboratory Last Values











WBC  13.7 Th/cmm (4.8-10.8)  H  18  04:20    


 


RBC  2.89 Mil/cmm (3.80-5.20)  L  18  04:20    


 


Hgb  8.5 gm/dL (12-16)  L  18  04:20    


 


Hct  25.3 % (41.0-60)  L  18  04:20    


 


MCV  87.3 fl ()   18  04:20    


 


MCH  29.4 pg (27.0-31.0)   18  04:20    


 


MCHC Differential  33.7 pg (28.0-36.0)   18  04:20    


 


RDW  13.7 % (11.5-20.0)   18  04:20    


 


Plt Count  196 Th/cmm (150-400)   18  04:20    


 


MPV  9.0 fl  18  04:20    


 


Add Manual Diff  YES   18  06:10    


 


Neutrophils %  75.0 % (40.0-80.0)   18  04:20    


 


Band Neutrophils %  3 % (0-10)   18  04:45    


 


Lymphocytes %  13.7 % (20.0-50.0)  L  18  04:20    


 


Monocytes %  7.0 % (2.0-10.0)   18  04:20    


 


Eosinophils %  3.8 % (0.0-5.0)   18  04:20    


 


Basophils %  0.5 % (0.0-2.0)   18  04:20    


 


Neutrophils (Manual)  75 % (40-80)   18  04:45    


 


Lymphocytes  18 % (20-50)  L  18  04:45    


 


Monocytes  3 % (2-10)   18  04:45    


 


Eosinophils  1 % (0-5)   18  04:45    


 


Basophils  1 % (0-3)   18  04:45    


 


Platelet Estimate  ADEQUATE  (NORMAL)   18  04:45    


 


Anisocytosis  1+   11/22/18  06:10    


 


PT  10.6 SECONDS (9.5-11.5)   18  04:45    


 


INR  1.02  (0.5-1.4)   18  04:45    


 


PTT (Actin FS)  22.1 SECONDS (26.0-38.0)  L  18  19:40    


 


Specimen Source  Arterial   18  08:36    


 


Sample Site  Right Radial   18  08:36    


 


pH  7.53  (7.35-7.45)  H  18  08:36    


 


pCO2  35.0 mmHg (35.0-45.0)   18  08:36    


 


pO2  78.0 mmHg (80.0-100.0)  L  18  08:36    


 


HCO3  29.9 mEq/L (20.0-26.0)  H  18  08:36    


 


Base Excess  6.4 mEq/L (-3.0-3.0)  H  18  08:36    


 


O2 Saturation  97.0 % (92.0-100.0)   18  08:36    


 


Boubacar Test  PASS   18  08:36    


 


Vent Rate  6   18  08:36    


 


Inspired O2  28   18  08:36    


 


Tidal Volume  450   18  08:36    


 


PEEP  5   18  08:36    


 


Pressure (ins/psv/peep)  10   18  08:36    


 


Critical Value  RN   18  08:36    


 


Sodium  147 mEq/L (136-145)  H  18  04:20    


 


Potassium  3.5 mEq/L (3.5-5.1)   18  04:20    


 


Chloride  110 mEq/L ()  H  18  04:20    


 


Carbon Dioxide  26.4 mEq/L (21.0-31.0)   18  04:20    


 


Anion Gap  14.1  (7.0-16.0)   18  04:20    


 


BUN  34 mg/dL (7-25)  H  18  04:20    


 


Creatinine  1.7 mg/dL (0.6-1.2)  H  18  04:20    


 


Est GFR ( Amer)  TNP   18  04:20    


 


Est GFR (Non-Af Amer)  TNP   18  04:20    


 


BUN/Creatinine Ratio  20.0   18  04:20    


 


Glucose  172 mg/dL ()  H D 18  04:20    


 


POC Glucose  194 MG/DL (70 - 105)  H  18  06:37    


 


Whole Bld Lactic Acid  2.46 mmol/L (0.60-1.99)  H*  18  10:07    


 


Calcium  8.4 mg/dL (8.6-10.3)  L  18  04:20    


 


Phosphorus  2.8 mg/dL (2.5-5.0)   18  04:35    


 


Magnesium  2.2 mg/dL (1.9-2.7)   18  04:45    


 


Total Bilirubin  0.4 mg/dL (0.3-1.0)   18  07:42    


 


AST  11 U/L (13-39)  L  18  07:42    


 


ALT  9 U/L (7-52)   18  07:42    


 


Alkaline Phosphatase  70 U/L ()   18  07:42    


 


B-Natriuretic Peptide  1920.0 pg/mL (5.0-100.0)  H  18  04:20    


 


Total Protein  4.4 gm/dL (6.0-8.3)  L  18  07:42    


 


Albumin  2.3 gm/dL (3.7-5.3)  L  18  07:42    


 


Globulin  2.1 gm/dL  18  07:42    


 


Albumin/Globulin Ratio  1.1  (1.0-1.8)   18  07:42    


 


Triglycerides  126 mg/dL (<150)   18  04:10    


 


Cholesterol  124 mg/dL (<200)   18  04:10    


 


LDL Cholesterol Direct  84 mg/dL ()   18  04:10    


 


HDL Cholesterol  30 mg/dL (23-92)   18  04:10    


 


TSH  0.75 uIU/ml (0.34-5.60)   18  04:10    


 


Vancomycin Trough  15.3 ug/mL (5-10)  H  18  09:30    


 


Helicobacter pylori Ab  NEGATIVE  (NEGATIVE)   18  08:35    


 


Blood Type  O POSITIVE   18  19:40    


 


Antibody Screen  NEGATIVE   18  19:40    


 


Crossmatch  See Detail   18  19:40    














- Physical Exam


Vitals and I&O: 


 Vital Signs











Temp  97.5 F   18 10:00


 


Pulse  79   18 10:00


 


Resp  25   18 10:00


 


BP  146/70   18 10:00


 


Pulse Ox  100   18 10:00








 Intake & Output











 18





 18:59 06:59 18:59


 


Intake Total 830 682.667 50


 


Output Total 1550 900 


 


Balance -720 -217.333 50


 


Weight (lbs) 74.843 kg 75.614 kg 


 


Intake:   


 


  Intake, IV Amount 350 362.667 50


 


    D5-0.45NS 1,000 ml @ 40  262.667 





    mls/hr IV .Q24H [facility] Rx#:   





    [id number]   


 


    Piperacillin Sodium/ 100 100 50





    Tazobact 2.25 gm In   





    Sodium Chloride 0.9% 50   





    ml @ 100 mls/hr IV Q6H   





    [facility] Rx#:[id number]   


 


    Vancomycin HCl 0.75 gm In 250  





    Sodium Chloride 0.9% 250   





    ml @ 165 mls/hr IV Q24H   





    [facility] Rx#:[id number]   


 


  Oral  120 


 


  Tube Feeding 360 200 


 


  Other 120  


 


Output:   


 


  Urine 1550 900 


 


Other:   


 


  # Bowel Movements 0 1 


 


  Stool Characteristics   Soft





   Formed


 


  Weight Source Bedscale Bedscale 











Active Medications: 


Current Medications





Acetaminophen (Tylenol)  650 mg PO Q4HR PRN


   PRN Reason: MILD PAIN 1-3


   Stop: 19 17:58


Acetaminophen (Tylenol)  650 mg PO Q4HR PRN


   PRN Reason: TEMP >100.4


   Stop: 19 17:58


Acetylcysteine (Mucomyst 20%)  3 ml HHN Q4HRT [facility]


   Stop: 19 14:59


   Last Admin: 18 07:09 Dose:  3 ml


Albuterol/Ipratropium (Duoneb Neb)  3 ml HHN Q4HRT [facility]


   Stop: 19 14:59


   Last Admin: 18 07:09 Dose:  3 ml


Amlodipine Besylate (Norvasc)  5 mg PO DAILY [facility]


   Stop: 19 08:59


   Last Admin: 18 09:00 Dose:  Not Given


Ascorbic Acid (Vitamin C)  500 mg PO DAILY [facility]


   Stop: 19 08:59


   Last Admin: 18 09:00 Dose:  Not Given


Atorvastatin Calcium (Lipitor)  10 mg PO HS [facility]; Protocol


   Stop: 19 20:59


   Last Admin: 18 20:22 Dose:  10 mg


Bisacodyl (Dulcolax 10 Mg Supp)  10 mg RC DAILY PRN


   PRN Reason: IF MOM INEFFECTIVE


   Stop: 19 17:58


Chlorhexidine Gluconate (Peridex)  15 ml MM 0800, [facility]


   Stop: 19 19:59


   Last Admin: 18 08:10 Dose:  15 ml


Furosemide (Lasix)  40 mg IVP DAILY [facility]


   Stop: 19 08:59


   Last Admin: 18 08:04 Dose:  40 mg


Diltiazem HCl 125 mg/ Dextrose  100 mls @ 0 mls/hr IV Q8H PRN; Protocol


   PRN Reason: blood pressure


   Stop: 19 07:48


   Last Admin: 18 09:00 Dose:  6.25 mg/hr, 5 mls/hr


Piperacillin Sod/Tazobactam (Sod 2.25 gm/ Sodium Chloride)  50 mls @ 100 mls/hr 

IV Q6H [facility]


   Stop: 19 13:59


   Last Infusion: 18 08:35 Dose:  Infused


Dextrose/Sodium Chloride (D5-0.45ns)  1,000 mls @ 40 mls/hr IV .Q24H [facility]


   Stop: 19 00:00


   Last Infusion: 18 06:49 Dose:  40 mls/hr


Vancomycin HCl 1.25 gm/ Sodium (Chloride)  250 mls @ 165 mls/hr IV Q36H [facility]


   Stop: 19 08:59


   Last Admin: 18 10:18 Dose:  165 mls/hr


Insulin Aspart (Novolog Insulin Sliding Scale)  0 units SUBQ Q6HR [facility]; Protocol


   Stop: 19 01:14


   Last Admin: 18 07:47 Dose:  3 units


Lactobacillus Rhamnosus (Culturelle 15b)  1 each PO DAILY [facility]


   Stop: 19 08:59


   Last Admin: 18 09:00 Dose:  Not Given


Lorazepam (Ativan)  1 mg IVP Q4HR PRN; Protocol


   PRN Reason: Agitation


   Stop: 19 07:12


   Last Admin: 18 08:29 Dose:  1 mg


Miscellaneous (Probiotic Screen)  1 ea  PRN PRN


   PRN Reason: PROTOCOL


   Stop: 19 15:29


Miscellaneous (Vancomycin Iv Per Pharmacy)  1 ea  PRN [facility]


   Stop: 19 23:44


Miscellaneous (Zosyn Iv Per Pharmacy)  1 ea  PRN PRN


   PRN Reason: PROTOCOL


   Stop: 19 12:03


Morphine Sulfate (Morphine)  1 mg IVP Q4HR PRN


   PRN Reason: Pain (Moderate)


   Stop: 19 10:04


   Last Admin: 18 02:07 Dose:  1 mg


Pantoprazole Sodium (Protonix)  40 mg IVP BID [facility]


   Stop: 19 08:59


   Last Admin: 18 08:04 Dose:  40 mg


Polyethylene Glycol (Miralax)  17 gm PO BID [facility]


   Stop: 19 08:59


   Last Admin: 18 09:00 Dose:  Not Given








General: Alert


HEENT: Atraumatic


Neck: Supple


Cardiovascular: no Regular rate


Abdomen: Bowel sounds, Soft, no Tender, no Hepatomegaly, no Splenomegaly, no 

Distended, no Rebound





- Procedures


Procedures: 


 Procedures











Procedure Code Date


 


EXCISION OF STOMACH, PYLORUS, ENDO, DIAGN 7JF85LY 18


 


INSERTION OF FEEDING DEVICE INTO STOMACH, ENDO [id number] 18


 


RESPIRATORY VENTILATION, GREATER THAN 96 CONSECUTIVE HOURS [id number] 18














Assessment/Plan





- Problem List


Patient Problems: 


All Active Problems





COFFEE GROUND EMESIS (Acute) 











Nutritional Asmnt/Malnutr-PDOC





- Dietary Evaluation


Malnutrition Findings (Please click <Entered> for more info): 








Nutritional Asmnt/Malnutrition                             Start:  18 15:

21


Text:                                                      Status: Complete    

  


Freq:                                                                          

  


Protocol:                                                                      

  


 Document     18 15:22  DYANG  (Rec: 18 15:44  DYFORREST  [email or web address])


 Nutritional Asmnt/Malnutrition


     Patient General Information


      Nutritional Screening                      High Risk


      Diagnosis                                  sepsis, dehydration, pneumonia


      Pertinent Medical Hx/Surgical Hx           HTN, DM, dyslipidemia,


                                                 dementia, anemia, muscle


                                                 weakness


      Subjective Information                     Pt receiving care from RN at


                                                 time of visit. Per nurse notes


                                                 , pt had coffee ground emesis,


                                                 NGT was unable to be inserted


                                                 d/t large retro cardiac


                                                 hiatal hernia, and will have


                                                 EGD tomorrow. Spoke w/ RN


                                                 Vi by phone who verfied


                                                 the nurse notes and states pt


                                                 will continue to be NPO.


      Current Diet Order/ Nutrition Support      NPO


      Pertinent Medications                      D5-0.45ns, novolog, culturelle


                                                 , pantoprazole


      Pertinent Labs                             : glucose 392, Na 144, Cl


                                                 110, BUN 78, Alb 2.9, 


                                                 -423


                                                 : glucose 547, Na 134, Cl


                                                 97, BUN 69, Alb 3.0, -


                                                 485


     Nutritional Hx/Data


      Height                                     1.63 m


      Height (Calculated Centimeters)            162.6


      Current Weight (lbs)                       64.682 kg


      Weight (Calculated Kilograms)              64.7


      Weight (Calculated Grams)                  59629.3


      Ideal Body Weight                          120 lb


      Body Mass Index (BMI)                      24.5


      Weight Status                              Approriate


     GI Symptoms


      GI Symptoms                                Nausea


                                                 Vomitting


      Last BM                                    none noted


      Difficult in:                              None


      Food Allergies                             No


      Skin Integrity/Comment:                    angelica bee 13


      Current %PO                                Negligible < 25%


     Estimated Nutritional Goals


      BEE in Kcals:                              Using Current wt


      Calories/Kcals/Kg                          30-35


      Kcals Calculated                           [id number]


      Protein:                                   Using Current wt


      Protein g/k.2-1.5


      Protein Calculated                         78-97 g


      Fluid: ml                                  per MD


     Nutritional Problem


      2. Problem


       Problem                                   Altered nutrition related lab


                                                 values


       Etiology                                  dehydration, endocrine


                                                 dysfunction


       Signs/Symptoms:                           Cl 110, BUN 78, glucose 392,


                                                 -423


      1. Problem


       Problem                                   Increased nutrient needs


       Etiology                                  metabolic stress


       Signs/Symptoms:                           dx of sepsis and pneumonia


     Malnutrition Related to Morbid Obesity


      Malnutrition related to morbid obesity     No


     Intervention/Recommendation


      Comments                                   1. Monitor NPO status and


                                                 advance diet when medically


                                                 appropriate


                                                 2. Consider alternative


                                                 nutrition route if pt unable


                                                 to tolerate PO intake


                                                 3. Monitor wt, nutrition


                                                 related labs, and skin


                                                 integrity


                                                 4. F/U as high risk in 2-3


                                                 days, -


     Expected Outcomes/Goals


      Expected Outcomes/Goals                    1. Pt to start oral intake or


                                                 alternate nutrition route if


                                                 necessary


                                                 2. Wt stability, skin to


                                                 remain intact, nutrition


                                                 related labs to approach


                                                 normal limits


                                                 Reveiwed by Lisa Haas RD

## 2018-11-27 NOTE — OPERATIVE REPORT
DATE OF SURGERY:  11/27/2018



PROCEDURE PERFORMED:  EGD with G-tube placement.



ENDOSCOPIST:  Harsha Webb M.D.



PREOPERATIVE DIAGNOSES:  Dysphagia, aspiration pneumonia.



POSTOPERATIVE DIAGNOSIS:  G-tube placement.



INDICATION:  The patient is an 88-year-old female who has been admitted to the

hospital for about a week now with aspiration pneumonia and coffee-ground

emesis.  She was found to have a large hiatal hernia about 6-7 cm that was

bleeding with an ulcer within the hernia sac.  At the time of the endoscopy, two

clips were placed along this ulcer and an NG tube was placed into the stomach

endoscopically as bedside NG tube placement was not successful.  Since that

time, the patient was intubated and has been improving from a pneumonia

standpoint and now GI has asked for a G-tube given the dysphagia and aspiration

pneumonia, initially suffered, this is reasonable given the size of the hiatal

hernia, I doubt the patient has much of a swallowing ability at this point.  If

she does regain her swallowing function later on, the G-tube can be removed. 

Consent was obtained from the patient's daughter.



CONSENT:  Informed consent was obtained from the patient's daughter.  The risks

and benefits of the procedure were discussed include but not limited to

infection, bleeding, perforation, need for surgery, cardiopulmonary

complications, missed pathology, G-tube related bleeding, G-tube insertion

related bowel perforation, premature G-tube removal by the patient, and death. 

The patient's daughter indicated her understanding of these risks, wished to go

forward with the procedure, and signed a consent form.



ANESTHESIA:  Propofol was admitted by anesthesiologist, Dr. Whitten, who is at

bedside during the procedure in ICU.



PROCEDURE IN DETAIL:  The patient was hooked up the appropriate monitoring

devices at bedside in the ICU including blood pressure, pulse, and pulse

oximetry.  An IV was in place.  Oxygen was delivered via the patient's

endotracheal tube throughout the procedure and anesthesia was administered by

Dr. Whitten of anesthesia.  The patient was in the supine position.  A mouthpiece

was inserted and secured.  Next, a gastroscope was introduced into the mouth and

guided under direct visualization into the esophagus, stomach, and duodenum. 

Again seen in the esophagus, was a 5-6 cm hiatal hernia.  The diaphragmatic

pinch was at 41 cm while the end of gastric fold was at 35 cm.  The two

endoclips that were placed previously were still in place within the hernia sac

itself and there was no active bleeding.  At this point, the NG tube was removed

within the stomach.  There was no ulcer or mass lesion and the duodenum appeared

endoscopically normal as well.  An appropriate position for the G-tube was then

found along the lesser curve of the gastric body, which was confirmed by

transillumination as well as single poke method providing good 1:1 correlation

on the gastric mucosa.  A 22 gauge needle was then used to inject subcutaneous

lidocaine into this area.  The same needle was then used to puncture the stomach

under direct visualization from the scope without much resistance observed.  A

small incision was made along the skin here and a trocar was placed through the

incision and directly observed to puncture the stomach without any resistance. 

At this point, the trocar was secured in place using a snare device and the

needle was removed.  A guidewire was inserted through the trocar and grasped

with the snare.  The guidewire and the scope were then pulled out of the

patient's mouth and a 20-Spanish G-tube was affixed securely to the guidewire. 

Using standard pull technique, the guidewire and G-tube were then pulled back

down through the mouth, through the esophagus, and snugged to the abdominal

wall.  Antibiotic ointment was put onto the skin at this point and the external

bumper was put in the correct location.  The scope was reintroduced into the

mouth and guided under direct visualization into the esophagus and stomach and

confirmed the appropriate location of the internal bumper along the lesser curve

of the gastric body.  This bumper freely moved and twisted upon manipulation. 

At this point, the procedure was complete and the scope was withdrawn.  Of note,

the patient did receive Zosyn prior to the procedure start.



RECOMMENDATIONS:

1. The new G-tube can be used immediately for medications and water flushes.

2. Tube feeds can be started in 8 hours with the previous feed formula.  The

feeds can be started at 10 mL an hour and increase by 10 mL per hour until goal

rate is reached.

3. Hold for any gastric residual greater than 100 mL.

4. Flush the G-tube with 100 mL of water every 6 hours.

5. Abdominal binder is recommended.

6. If the patient does regain her swallowing function enough to the point where

she may not need this G-tube, it can be removed in 4 weeks' time.



Thank you for allowing me to participate in this patient's care.  Please call

with any further questions.





DD: 11/27/2018 09:14

DT: 11/27/2018 09:52

JOB# [id number]  [id number]

## 2018-11-28 LAB
ANION GAP SERPL CALC-SCNC: 15.2 MMOL/L (ref 7–16)
BASOPHILS # BLD AUTO: 0.1 TH/CUMM (ref 0–0.2)
BASOPHILS NFR BLD AUTO: 0.6 % (ref 0–2)
BUN SERPL-MCNC: 32 MG/DL (ref 7–25)
CALCIUM SERPL-MCNC: 8.2 MG/DL (ref 8.6–10.3)
CHLORIDE SERPL-SCNC: 109 MEQ/L (ref 98–107)
CO2 SERPL-SCNC: 26.1 MEQ/L (ref 21–31)
CREAT SERPL-MCNC: 1.6 MG/DL (ref 0.6–1.2)
EOSINOPHIL # BLD AUTO: 0.7 TH/CMM (ref 0.1–0.4)
EOSINOPHIL NFR BLD AUTO: 6.1 % (ref 0–5)
ERYTHROCYTE [DISTWIDTH] IN BLOOD BY AUTOMATED COUNT: 13.6 % (ref 11.5–20)
GLUCOSE SERPL-MCNC: 250 MG/DL (ref 70–105)
HCT VFR BLD CALC: 26.7 % (ref 41–60)
HGB BLD-MCNC: 8.8 GM/DL (ref 12–16)
LYMPHOCYTE AB SER FC-ACNC: 1.8 TH/CMM (ref 1.5–3)
LYMPHOCYTES NFR BLD AUTO: 16 % (ref 20–50)
MCH RBC QN AUTO: 29.3 PG (ref 27–31)
MCHC RBC AUTO-ENTMCNC: 33 PG (ref 28–36)
MCV RBC AUTO: 88.6 FL (ref 81–100)
MONOCYTES # BLD AUTO: 0.8 TH/CMM (ref 0.3–1)
MONOCYTES NFR BLD AUTO: 7.1 % (ref 2–10)
NEUTROPHILS # BLD: 7.9 TH/CMM (ref 1.8–8)
NEUTROPHILS NFR BLD AUTO: 70.2 % (ref 40–80)
PLATELET # BLD: 226 TH/CMM (ref 150–400)
PMV BLD AUTO: 9.7 FL
POTASSIUM SERPL-SCNC: 3.3 MEQ/L (ref 3.5–5.1)
RBC # BLD AUTO: 3.02 MIL/CMM (ref 3.8–5.2)
SODIUM SERPL-SCNC: 147 MEQ/L (ref 136–145)
WBC # BLD AUTO: 11.3 TH/CMM (ref 4.8–10.8)

## 2018-11-28 RX ADMIN — IPRATROPIUM BROMIDE AND ALBUTEROL SULFATE SCH ML: .5; 3 SOLUTION RESPIRATORY (INHALATION) at 18:32

## 2018-11-28 RX ADMIN — IPRATROPIUM BROMIDE AND ALBUTEROL SULFATE SCH ML: .5; 3 SOLUTION RESPIRATORY (INHALATION) at 14:44

## 2018-11-28 RX ADMIN — INSULIN ASPART SCH UNITS: 100 INJECTION, SOLUTION INTRAVENOUS; SUBCUTANEOUS at 17:28

## 2018-11-28 RX ADMIN — POLYETHYLENE GLYCOL 3350 SCH GM: 17 POWDER, FOR SOLUTION ORAL at 08:29

## 2018-11-28 RX ADMIN — IPRATROPIUM BROMIDE AND ALBUTEROL SULFATE SCH ML: .5; 3 SOLUTION RESPIRATORY (INHALATION) at 10:44

## 2018-11-28 RX ADMIN — IPRATROPIUM BROMIDE AND ALBUTEROL SULFATE SCH ML: .5; 3 SOLUTION RESPIRATORY (INHALATION) at 03:05

## 2018-11-28 RX ADMIN — IPRATROPIUM BROMIDE AND ALBUTEROL SULFATE SCH ML: .5; 3 SOLUTION RESPIRATORY (INHALATION) at 06:58

## 2018-11-28 RX ADMIN — MORPHINE SULFATE PRN MG: 2 INJECTION, SOLUTION INTRAMUSCULAR; INTRAVENOUS at 23:01

## 2018-11-28 RX ADMIN — SODIUM CHLORIDE SCH MLS/HR: 9 INJECTION, SOLUTION INTRAVENOUS at 07:57

## 2018-11-28 RX ADMIN — POLYETHYLENE GLYCOL 3350 SCH GM: 17 POWDER, FOR SOLUTION ORAL at 16:28

## 2018-11-28 RX ADMIN — IPRATROPIUM BROMIDE AND ALBUTEROL SULFATE SCH ML: .5; 3 SOLUTION RESPIRATORY (INHALATION) at 22:11

## 2018-11-28 RX ADMIN — SODIUM CHLORIDE SCH MLS/HR: 9 INJECTION, SOLUTION INTRAVENOUS at 20:03

## 2018-11-28 RX ADMIN — DEXTROSE AND SODIUM CHLORIDE SCH MLS/HR: 5; .45 INJECTION, SOLUTION INTRAVENOUS at 08:28

## 2018-11-28 RX ADMIN — Medication SCH EACH: at 08:29

## 2018-11-28 RX ADMIN — SODIUM CHLORIDE SCH MLS/HR: 9 INJECTION, SOLUTION INTRAVENOUS at 02:27

## 2018-11-28 RX ADMIN — SODIUM CHLORIDE SCH MLS/HR: 9 INJECTION, SOLUTION INTRAVENOUS at 15:31

## 2018-11-28 RX ADMIN — INSULIN ASPART SCH UNITS: 100 INJECTION, SOLUTION INTRAVENOUS; SUBCUTANEOUS at 00:23

## 2018-11-28 RX ADMIN — INSULIN ASPART SCH UNITS: 100 INJECTION, SOLUTION INTRAVENOUS; SUBCUTANEOUS at 11:46

## 2018-11-28 RX ADMIN — INSULIN ASPART SCH UNITS: 100 INJECTION, SOLUTION INTRAVENOUS; SUBCUTANEOUS at 06:34

## 2018-11-28 NOTE — GI PROGRESS NOTE
Subjective





- Review of Systems


Service Date: 11/28/18


Subjective: 


Extubated, tolerating G tube feeding





Objective





- Results


Result Diagrams: 


 11/28/18 04:45





 11/28/18 04:45


Recent Labs: 


 Laboratory Last Values











WBC  11.3 Th/cmm (4.8-10.8)  H  11/28/18  04:45    


 


RBC  3.02 Mil/cmm (3.80-5.20)  L  11/28/18  04:45    


 


Hgb  8.8 gm/dL (12-16)  L  11/28/18  04:45    


 


Hct  26.7 % (41.0-60)  L  11/28/18  04:45    


 


MCV  88.6 fl ()   11/28/18  04:45    


 


MCH  29.3 pg (27.0-31.0)   11/28/18  04:45    


 


MCHC Differential  33.0 pg (28.0-36.0)   11/28/18  04:45    


 


RDW  13.6 % (11.5-20.0)   11/28/18  04:45    


 


Plt Count  226 Th/cmm (150-400)   11/28/18  04:45    


 


MPV  9.7 fl  11/28/18  04:45    


 


Add Manual Diff  YES   11/22/18  06:10    


 


Neutrophils %  70.2 % (40.0-80.0)   11/28/18  04:45    


 


Band Neutrophils %  3 % (0-10)   11/26/18  04:45    


 


Lymphocytes %  16.0 % (20.0-50.0)  L  11/28/18  04:45    


 


Monocytes %  7.1 % (2.0-10.0)   11/28/18  04:45    


 


Eosinophils %  6.1 % (0.0-5.0)  H  11/28/18  04:45    


 


Basophils %  0.6 % (0.0-2.0)   11/28/18  04:45    


 


Neutrophils (Manual)  75 % (40-80)   11/26/18  04:45    


 


Lymphocytes  18 % (20-50)  L  11/26/18  04:45    


 


Monocytes  3 % (2-10)   11/26/18  04:45    


 


Eosinophils  1 % (0-5)   11/26/18  04:45    


 


Basophils  1 % (0-3)   11/21/18  04:45    


 


Platelet Estimate  ADEQUATE  (NORMAL)   11/26/18  04:45    


 


Anisocytosis  1+   11/22/18  06:10    


 


PT  10.6 SECONDS (9.5-11.5)   11/21/18  04:45    


 


INR  1.02  (0.5-1.4)   11/21/18  04:45    


 


PTT (Actin FS)  22.1 SECONDS (26.0-38.0)  L  11/19/18  19:40    


 


Specimen Source  arterial   11/27/18  11:45    


 


Sample Site  rr   11/27/18  11:45    


 


pH  7.46  (7.35-7.45)  H  11/27/18  11:45    


 


pCO2  43.0 mmHg (35.0-45.0)   11/27/18  11:45    


 


pO2  81.0 mmHg (80.0-100.0)   11/27/18  11:45    


 


HCO3  29.7 mEq/L (20.0-26.0)  H  11/27/18  11:45    


 


Base Excess  6.1 mEq/L (-3.0-3.0)  H  11/27/18  11:45    


 


O2 Saturation  96.0 % (92.0-100.0)   11/27/18  11:45    


 


Boubacar Test  pos   11/27/18  11:45    


 


Vent Rate  na   11/27/18  11:45    


 


Inspired O2  40   11/27/18  11:45    


 


Tidal Volume  na   11/27/18  11:45    


 


PEEP  na   11/27/18  11:45    


 


Pressure (ins/psv/peep)  na   11/27/18  11:45    


 


Critical Value  rninofranco rcp   11/27/18  11:45    


 


Sodium  147 mEq/L (136-145)  H  11/28/18  04:45    


 


Potassium  3.3 mEq/L (3.5-5.1)  L  11/28/18  04:45    


 


Chloride  109 mEq/L ()  H  11/28/18  04:45    


 


Carbon Dioxide  26.1 mEq/L (21.0-31.0)   11/28/18  04:45    


 


Anion Gap  15.2  (7.0-16.0)   11/28/18  04:45    


 


BUN  32 mg/dL (7-25)  H  11/28/18  04:45    


 


Creatinine  1.6 mg/dL (0.6-1.2)  H  11/28/18  04:45    


 


Est GFR ( Amer)  TNP   11/28/18  04:45    


 


Est GFR (Non-Af Amer)  TNP   11/28/18  04:45    


 


BUN/Creatinine Ratio  20.0   11/28/18  04:45    


 


Glucose  250 mg/dL ()  H  11/28/18  04:45    


 


POC Glucose  282 MG/DL (70 - 105)  H  11/28/18  11:42    


 


Whole Bld Lactic Acid  2.46 mmol/L (0.60-1.99)  H*  11/20/18  10:07    


 


Calcium  8.2 mg/dL (8.6-10.3)  L  11/28/18  04:45    


 


Phosphorus  2.8 mg/dL (2.5-5.0)   11/24/18  04:35    


 


Magnesium  2.2 mg/dL (1.9-2.7)   11/21/18  04:45    


 


Total Bilirubin  0.4 mg/dL (0.3-1.0)   11/25/18  07:42    


 


AST  11 U/L (13-39)  L  11/25/18  07:42    


 


ALT  9 U/L (7-52)   11/25/18  07:42    


 


Alkaline Phosphatase  70 U/L ()   11/25/18  07:42    


 


B-Natriuretic Peptide  1920.0 pg/mL (5.0-100.0)  H  11/27/18  04:20    


 


Total Protein  4.4 gm/dL (6.0-8.3)  L  11/25/18  07:42    


 


Albumin  2.3 gm/dL (3.7-5.3)  L  11/25/18  07:42    


 


Globulin  2.1 gm/dL  11/25/18  07:42    


 


Albumin/Globulin Ratio  1.1  (1.0-1.8)   11/25/18  07:42    


 


Triglycerides  126 mg/dL (<150)   11/20/18  04:10    


 


Cholesterol  124 mg/dL (<200)   11/20/18  04:10    


 


LDL Cholesterol Direct  84 mg/dL ()   11/20/18  04:10    


 


HDL Cholesterol  30 mg/dL (23-92)   11/20/18  04:10    


 


TSH  0.75 uIU/ml (0.34-5.60)   11/20/18  04:10    


 


Vancomycin Trough  15.3 ug/mL (5-10)  H  11/26/18  09:30    


 


Helicobacter pylori Ab  NEGATIVE  (NEGATIVE)   11/21/18  08:35    


 


Blood Type  O POSITIVE   11/19/18  19:40    


 


Antibody Screen  NEGATIVE   11/19/18  19:40    


 


Crossmatch  See Detail   11/19/18  19:40    














- Physical Exam


Vitals and I&O: 


 Vital Signs











Temp  98.0 F   11/28/18 14:00


 


Pulse  78   11/28/18 14:55


 


Resp  18   11/28/18 14:55


 


BP  148/48   11/28/18 14:00


 


Pulse Ox  97   11/28/18 14:55








 Intake & Output











 11/27/18 11/28/18 11/28/18





 18:59 06:59 18:59


 


Intake Total 470 645.333 501


 


Output Total 2500  2150


 


Balance -2030 645.333 -1649


 


Weight (lbs) 74.843 kg  73.663 kg


 


Intake:   


 


  Intake, IV Amount 350 645.333 50


 


    D5-0.45NS 1,000 ml @ 40  545.333 0





    mls/hr IV .Q24H [facility] Rx#:   





    [id number]   


 


    Piperacillin Sodium/ 100 100 50





    Tazobact 2.25 gm In   





    Sodium Chloride 0.9% 50   





    ml @ 100 mls/hr IV Q6H   





    [facility] Rx#:[id number]   


 


    Vancomycin HCl 1.25 gm In 250  





    Sodium Chloride 0.9% 250   





    ml @ 165 mls/hr IV Q36H   





    [facility] Rx#:[id number]   


 


  Tube Feeding   451


 


  Other 120  


 


Output:   


 


  Urine 2500  2150


 


Other:   


 


  # Bowel Movements 3  1


 


  Stool Characteristics Soft Liquid Soft





 Formed Black Liquid





  Green Green


 


  Weight Source Bedscale  Bedscale











Active Medications: 


Current Medications





Acetaminophen (Tylenol)  650 mg PO Q4HR PRN


   PRN Reason: MILD PAIN 1-3


   Stop: 01/19/19 17:58


   Last Admin: 11/28/18 11:15 Dose:  650 mg


Acetaminophen (Tylenol)  650 mg PO Q4HR PRN


   PRN Reason: TEMP >100.4


   Stop: 01/19/19 17:58


Acetylcysteine (Mucomyst 20%)  3 ml HHN Q4HRT [facility]


   Stop: 01/21/19 14:59


   Last Admin: 11/28/18 14:44 Dose:  3 ml


Albuterol/Ipratropium (Duoneb Neb)  3 ml HHN Q4HRT [facility]


   Stop: 01/20/19 14:59


   Last Admin: 11/28/18 14:44 Dose:  3 ml


Amlodipine Besylate (Norvasc)  5 mg PO DAILY [facility]


   Stop: 01/20/19 08:59


   Last Admin: 11/28/18 08:29 Dose:  5 mg


Ascorbic Acid (Vitamin C)  500 mg PO DAILY [facility]


   Stop: 01/20/19 08:59


   Last Admin: 11/28/18 08:29 Dose:  500 mg


Atorvastatin Calcium (Lipitor)  10 mg PO HS [facility]; Protocol


   Stop: 01/25/19 20:59


   Last Admin: 11/27/18 20:27 Dose:  10 mg


Bisacodyl (Dulcolax 10 Mg Supp)  10 mg RC DAILY PRN


   PRN Reason: IF MOM INEFFECTIVE


   Stop: 01/19/19 17:58


Furosemide (Lasix)  40 mg IVP DAILY [facility]


   Stop: 01/26/19 08:59


   Last Admin: 11/28/18 08:29 Dose:  40 mg


Diltiazem HCl 125 mg/ Dextrose  100 mls @ 0 mls/hr IV Q8H PRN; Protocol


   PRN Reason: blood pressure


   Stop: 01/21/19 07:48


   Last Admin: 11/22/18 09:00 Dose:  6.25 mg/hr, 5 mls/hr


Piperacillin Sod/Tazobactam (Sod 2.25 gm/ Sodium Chloride)  50 mls @ 100 mls/hr 

IV Q6H [facility]


   Stop: 01/21/19 13:59


   Last Admin: 11/28/18 15:31 Dose:  100 mls/hr


Dextrose/Sodium Chloride (D5-0.45ns)  1,000 mls @ 40 mls/hr IV .Q24H [facility]


   Stop: 01/26/19 00:00


   Last Admin: 11/28/18 08:28 Dose:  40 mls/hr


Vancomycin HCl 1.25 gm/ Sodium (Chloride)  250 mls @ 165 mls/hr IV Q36H [facility]


   Stop: 01/26/19 08:59


   Last Infusion: 11/27/18 11:50 Dose:  Infused


Insulin Aspart (Novolog Insulin Sliding Scale)  0 units SUBQ Q6HR [facility]; Protocol


   Stop: 01/19/19 01:14


   Last Admin: 11/28/18 11:46 Dose:  7 units


Lactobacillus Rhamnosus (Culturelle 15b)  1 each PO DAILY [facility]


   Stop: 01/20/19 08:59


   Last Admin: 11/28/18 08:29 Dose:  1 each


Lorazepam (Ativan)  1 mg IVP Q4HR PRN; Protocol


   PRN Reason: Agitation


   Stop: 01/21/19 07:12


   Last Admin: 11/28/18 12:17 Dose:  1 mg


Miscellaneous (Probiotic Screen)  1 ea  PRN PRN


   PRN Reason: PROTOCOL


   Stop: 01/19/19 15:29


Miscellaneous (Vancomycin Iv Per Pharmacy)  1 ea  PRN [facility]


   Stop: 01/20/19 23:44


Miscellaneous (Zosyn Iv Per Pharmacy)  1 ea  PRN PRN


   PRN Reason: PROTOCOL


   Stop: 01/21/19 12:03


Morphine Sulfate (Morphine)  1 mg IVP Q4HR PRN


   PRN Reason: Pain (Moderate)


   Stop: 01/19/19 10:04


   Last Admin: 11/27/18 17:13 Dose:  1 mg


Pantoprazole Sodium (Protonix)  40 mg IVP BID [facility]


   Stop: 01/22/19 08:59


   Last Admin: 11/28/18 08:29 Dose:  40 mg


Polyethylene Glycol (Miralax)  17 gm PO BID [facility]


   Stop: 01/22/19 08:59


   Last Admin: 11/28/18 08:29 Dose:  17 gm








General: Alert


HEENT: Atraumatic


Neck: Supple


Cardiovascular: no Regular rate


Abdomen: Bowel sounds, Soft, no Tender, no Hepatomegaly, no Splenomegaly, no 

Distended, no Rebound





- Procedures


Procedures: 


 Procedures











Procedure Code Date


 


EXCISION OF STOMACH, PYLORUS, ENDO, DIAGN [id number] 11/19/18


 


INSERTION OF FEEDING DEVICE INTO STOMACH, ENDO [id number] 11/19/18


 


RESPIRATORY VENTILATION, GREATER THAN 96 CONSECUTIVE HOURS [id number] 11/19/18














Assessment/Plan





- Problem List


Patient Problems: 


All Active Problems





COFFEE GROUND EMESIS (Acute) 











- Assessment


Assessment: 


# Coffee ground emesis


# hiatal hernia


# Pneumonia


# Severe sepsis


# Fecal impaction





EGD on 11/21 showed 7cm hiatal hernia, with evidence of mucosal tear there from 

previous NG tube insertion attempts. 2 clips placed to this area. With some 

difficulty, an NG tube was endoscopically placed into the stomach. No stomach 

ulcer or mass. 





Now intubated, but no longer having GI bleed. Fecal impaction noted on KUB, but 

pt did have large stool on 11/24 with enema.





G tube placed on 11/27 without issue. 








Plan:





- swallow eval. If she passes, she can have oral gratification feeding. 


- cont ppi bid for 6 weeks, then daily


- cont IVF


- bowel regimen


- cont tube feeding at goal


- hold for residual > 100 cc


- flush g tube with 100cc water every 6 hrs





GI to see this pt as needed, please call with any questions

## 2018-11-28 NOTE — INTERNAL MEDICINE PROG NOTE
Internal Medicine Subjective





- Subjective


Service Date: 18


Patient is:: awake, other


Per staff patient has:: no adverse event, tolerating meds





Internal Medicine Objective





- Results


Result Diagrams: 


 18 04:45





 18 04:45


Recent Labs: 


 Laboratory Last Values











WBC  11.3 Th/cmm (4.8-10.8)  H  18  04:45    


 


RBC  3.02 Mil/cmm (3.80-5.20)  L  18  04:45    


 


Hgb  8.8 gm/dL (12-16)  L  18  04:45    


 


Hct  26.7 % (41.0-60)  L  18  04:45    


 


MCV  88.6 fl ()   18  04:45    


 


MCH  29.3 pg (27.0-31.0)   18  04:45    


 


MCHC Differential  33.0 pg (28.0-36.0)   18  04:45    


 


RDW  13.6 % (11.5-20.0)   18  04:45    


 


Plt Count  226 Th/cmm (150-400)   18  04:45    


 


MPV  9.7 fl  18  04:45    


 


Add Manual Diff  YES   18  06:10    


 


Neutrophils %  70.2 % (40.0-80.0)   18  04:45    


 


Band Neutrophils %  3 % (0-10)   18  04:45    


 


Lymphocytes %  16.0 % (20.0-50.0)  L  18  04:45    


 


Monocytes %  7.1 % (2.0-10.0)   18  04:45    


 


Eosinophils %  6.1 % (0.0-5.0)  H  18  04:45    


 


Basophils %  0.6 % (0.0-2.0)   18  04:45    


 


Neutrophils (Manual)  75 % (40-80)   18  04:45    


 


Lymphocytes  18 % (20-50)  L  18  04:45    


 


Monocytes  3 % (2-10)   18  04:45    


 


Eosinophils  1 % (0-5)   18  04:45    


 


Basophils  1 % (0-3)   18  04:45    


 


Platelet Estimate  ADEQUATE  (NORMAL)   18  04:45    


 


Anisocytosis  1+   18  06:10    


 


PT  10.6 SECONDS (9.5-11.5)   18  04:45    


 


INR  1.02  (0.5-1.4)   18  04:45    


 


PTT (Actin FS)  22.1 SECONDS (26.0-38.0)  L  18  19:40    


 


Specimen Source  arterial   18  11:45    


 


Sample Site  rr   18  11:45    


 


pH  7.46  (7.35-7.45)  H  18  11:45    


 


pCO2  43.0 mmHg (35.0-45.0)   18  11:45    


 


pO2  81.0 mmHg (80.0-100.0)   18  11:45    


 


HCO3  29.7 mEq/L (20.0-26.0)  H  18  11:45    


 


Base Excess  6.1 mEq/L (-3.0-3.0)  H  18  11:45    


 


O2 Saturation  96.0 % (92.0-100.0)   18  11:45    


 


Boubacar Test  pos   18  11:45    


 


Vent Rate  na   18  11:45    


 


Inspired O2  40   18  11:45    


 


Tidal Volume  na   18  11:45    


 


PEEP  na   18  11:45    


 


Pressure (ins/psv/peep)  na   18  11:45    


 


Critical Value  rninofranco rcp   18  11:45    


 


Sodium  147 mEq/L (136-145)  H  18  04:45    


 


Potassium  3.3 mEq/L (3.5-5.1)  L  18  04:45    


 


Chloride  109 mEq/L ()  H  18  04:45    


 


Carbon Dioxide  26.1 mEq/L (21.0-31.0)   18  04:45    


 


Anion Gap  15.2  (7.0-16.0)   18  04:45    


 


BUN  32 mg/dL (7-25)  H  18  04:45    


 


Creatinine  1.6 mg/dL (0.6-1.2)  H  18  04:45    


 


Est GFR ( Amer)  TNP   18  04:45    


 


Est GFR (Non-Af Amer)  TNP   18  04:45    


 


BUN/Creatinine Ratio  20.0   18  04:45    


 


Glucose  250 mg/dL ()  H  18  04:45    


 


POC Glucose  282 MG/DL (70 - 105)  H  18  11:42    


 


Whole Bld Lactic Acid  2.46 mmol/L (0.60-1.99)  H*  18  10:07    


 


Calcium  8.2 mg/dL (8.6-10.3)  L  18  04:45    


 


Phosphorus  2.8 mg/dL (2.5-5.0)   18  04:35    


 


Magnesium  2.2 mg/dL (1.9-2.7)   18  04:45    


 


Total Bilirubin  0.4 mg/dL (0.3-1.0)   18  07:42    


 


AST  11 U/L (13-39)  L  18  07:42    


 


ALT  9 U/L (7-52)   18  07:42    


 


Alkaline Phosphatase  70 U/L ()   18  07:42    


 


B-Natriuretic Peptide  1920.0 pg/mL (5.0-100.0)  H  18  04:20    


 


Total Protein  4.4 gm/dL (6.0-8.3)  L  18  07:42    


 


Albumin  2.3 gm/dL (3.7-5.3)  L  18  07:42    


 


Globulin  2.1 gm/dL  18  07:42    


 


Albumin/Globulin Ratio  1.1  (1.0-1.8)   18  07:42    


 


Triglycerides  126 mg/dL (<150)   18  04:10    


 


Cholesterol  124 mg/dL (<200)   18  04:10    


 


LDL Cholesterol Direct  84 mg/dL ()   18  04:10    


 


HDL Cholesterol  30 mg/dL (23-92)   18  04:10    


 


TSH  0.75 uIU/ml (0.34-5.60)   11/20/18  04:10    


 


Vancomycin Trough  15.3 ug/mL (5-10)  H  18  09:30    


 


Helicobacter pylori Ab  NEGATIVE  (NEGATIVE)   18  08:35    


 


Blood Type  O POSITIVE   18  19:40    


 


Antibody Screen  NEGATIVE   18  19:40    


 


Crossmatch  See Detail   18  19:40    














- Physical Exam


Vitals and I&O: 


 Vital Signs











Temp  98.0 F   18 14:00


 


Pulse  74   18 14:00


 


Resp  22   18 14:00


 


BP  148/48   18 14:00


 


Pulse Ox  93   18 14:00








 Intake & Output











 18





 18:59 06:59 18:59


 


Intake Total 470 645.333 451


 


Output Total 2500  2150


 


Balance -2030 645.333 -1699


 


Weight (lbs) 165 lb  162 lb 6.4 oz


 


Intake:   


 


  Intake, IV Amount 350 645.333 0


 


    D5-0.45NS 1,000 ml @ 40  545.333 0





    mls/hr IV .Q24H [facility] Rx#:   





    [id number]   


 


    Piperacillin Sodium/ 100 100 





    Tazobact 2.25 gm In   





    Sodium Chloride 0.9% 50   





    ml @ 100 mls/hr IV Q6H   





    [facility] Rx#:[id number]   


 


    Vancomycin HCl 1.25 gm In 250  





    Sodium Chloride 0.9% 250   





    ml @ 165 mls/hr IV Q36H   





    [facility] Rx#:[id number]   


 


  Tube Feeding   451


 


  Other 120  


 


Output:   


 


  Urine 2500  2150


 


Other:   


 


  # Bowel Movements 3  1


 


  Stool Characteristics Soft Liquid Soft





 Formed Black Liquid





  Green Green


 


  Weight Source Bedscale  Bedscale











Active Medications: 


Current Medications





Acetaminophen (Tylenol)  650 mg PO Q4HR PRN


   PRN Reason: MILD PAIN 1-3


   Stop: 19 17:58


   Last Admin: 18 11:15 Dose:  650 mg


Acetaminophen (Tylenol)  650 mg PO Q4HR PRN


   PRN Reason: TEMP >100.4


   Stop: 19 17:58


Acetylcysteine (Mucomyst 20%)  3 ml HHN Q4HRT [facility]


   Stop: 19 14:59


   Last Admin: 18 10:44 Dose:  3 ml


Albuterol/Ipratropium (Duoneb Neb)  3 ml HHN Q4HRT [facility]


   Stop: 19 14:59


   Last Admin: 18 10:44 Dose:  3 ml


Amlodipine Besylate (Norvasc)  5 mg PO DAILY [facility]


   Stop: 19 08:59


   Last Admin: 18 08:29 Dose:  5 mg


Ascorbic Acid (Vitamin C)  500 mg PO DAILY [facility]


   Stop: 19 08:59


   Last Admin: 18 08:29 Dose:  500 mg


Atorvastatin Calcium (Lipitor)  10 mg PO HS [facility]; Protocol


   Stop: 19 20:59


   Last Admin: 18 20:27 Dose:  10 mg


Bisacodyl (Dulcolax 10 Mg Supp)  10 mg RC DAILY PRN


   PRN Reason: IF MOM INEFFECTIVE


   Stop: 19 17:58


Furosemide (Lasix)  40 mg IVP DAILY [facility]


   Stop: 19 08:59


   Last Admin: 18 08:29 Dose:  40 mg


Diltiazem HCl 125 mg/ Dextrose  100 mls @ 0 mls/hr IV Q8H PRN; Protocol


   PRN Reason: blood pressure


   Stop: 19 07:48


   Last Admin: 18 09:00 Dose:  6.25 mg/hr, 5 mls/hr


Piperacillin Sod/Tazobactam (Sod 2.25 gm/ Sodium Chloride)  50 mls @ 100 mls/hr 

IV Q6H [facility]


   Stop: 19 13:59


   Last Admin: 18 07:57 Dose:  100 mls/hr


Dextrose/Sodium Chloride (D5-0.45ns)  1,000 mls @ 40 mls/hr IV .Q24H [facility]


   Stop: 19 00:00


   Last Admin: 18 08:28 Dose:  40 mls/hr


Vancomycin HCl 1.25 gm/ Sodium (Chloride)  250 mls @ 165 mls/hr IV Q36H [facility]


   Stop: 19 08:59


   Last Infusion: 18 11:50 Dose:  Infused


Insulin Aspart (Novolog Insulin Sliding Scale)  0 units SUBQ Q6HR [facility]; Protocol


   Stop: 19 01:14


   Last Admin: 18 11:46 Dose:  7 units


Lactobacillus Rhamnosus (Culturelle 15b)  1 each PO DAILY [facility]


   Stop: 19 08:59


   Last Admin: 18 08:29 Dose:  1 each


Lorazepam (Ativan)  1 mg IVP Q4HR PRN; Protocol


   PRN Reason: Agitation


   Stop: 19 07:12


   Last Admin: 18 12:17 Dose:  1 mg


Miscellaneous (Probiotic Screen)  1 ea  PRN PRN


   PRN Reason: PROTOCOL


   Stop: 19 15:29


Miscellaneous (Vancomycin Iv Per Pharmacy)  1 ea  PRN PATRICE


   Stop: 19 23:44


Miscellaneous (Zosyn Iv Per Pharmacy)  1 [facility] PRN PRN


   PRN Reason: PROTOCOL


   Stop: 19 12:03


Morphine Sulfate (Morphine)  1 mg IVP Q4HR PRN


   PRN Reason: Pain (Moderate)


   Stop: 19 10:04


   Last Admin: 18 17:13 Dose:  1 mg


Pantoprazole Sodium (Protonix)  40 mg IVP BID [facility]


   Stop: 19 08:59


   Last Admin: 18 08:29 Dose:  40 mg


Polyethylene Glycol (Miralax)  17 gm PO BID [facility]


   Stop: 19 08:59


   Last Admin: 18 08:29 Dose:  17 gm








General: weak


HEENT: NC/AT


Neck: Supple


Lungs: CTAB


Cardiovascular: Normal S1, Normal S2


Abdomen: non-tender


Extremities: clear, edema


Neurological: no change





- Procedures


Procedures: 


 Procedures











Procedure Code Date


 


EXCISION OF STOMACH, PYLORUS, ENDO, DIAGN [id number]


 


INSERTION OF FEEDING DEVICE INTO STOMACH, ENDO [id number]


 


RESPIRATORY VENTILATION, GREATER THAN 96 CONSECUTIVE HOURS [id number]














Internal Medicine Assmt/Plan





- Assessment


Assessment: 


s/p extubation 


 Severe sepsis.


Pneumonia.


Fecal impaction.


Gallbladder stone at bladder neck.


Diabetes mellitus type 2.


Hypertension.


Dementia.


Anemia.


Large hiatal hernia.








-








- Plan


Plan: 





monitor respiratory effort


follow up labs in am


LTAC eval 


cpm 





Nutritional Asmnt/Malnutr-PDOC





- Dietary Evaluation


Malnutrition Findings (Please click <Entered> for more info): 








Nutritional Asmnt/Malnutrition                             Start:  18 15:

21


Text:                                                      Status: Complete    

  


Freq:                                                                          

  


Protocol:                                                                      

  


 Document     18 15:22  [facility]  (Rec: 18 15:44  [facility]  [email or web address])


 Nutritional Asmnt/Malnutrition


     Patient General Information


      Nutritional Screening                      High Risk


      Diagnosis                                  sepsis, dehydration, pneumonia


      Pertinent Medical Hx/Surgical Hx           HTN, DM, dyslipidemia,


                                                 dementia, anemia, muscle


                                                 weakness


      Subjective Information                     Pt receiving care from RN at


                                                 time of visit. Per nurse notes


                                                 , pt had coffee ground emesis,


                                                 NGT was unable to be inserted


                                                 d/t large retro cardiac


                                                 hiatal hernia, and will have


                                                 EGD tomorrow. Spoke w/ LEVON Lebron by phone who verfied


                                                 the nurse notes and states pt


                                                 will continue to be NPO.


      Current Diet Order/ Nutrition Support      NPO


      Pertinent Medications                      D5-0.45ns, novolog, culturelle


                                                 , pantoprazole


      Pertinent Labs                             : glucose 392, Na 144, Cl


                                                 110, BUN 78, Alb 2.9, 


                                                 -423


                                                 : glucose 547, Na 134, Cl


                                                 97, BUN 69, Alb 3.0, -


                                                 485


     Nutritional Hx/Data


      Height                                     5 ft 4 in


      Height (Calculated Centimeters)            162.6


      Current Weight (lbs)                       142 lb 9.6 oz


      Weight (Calculated Kilograms)              64.7


      Weight (Calculated Grams)                  14943.3


      Ideal Body Weight                          120 lb


      Body Mass Index (BMI)                      24.5


      Weight Status                              Approriate


     GI Symptoms


      GI Symptoms                                Nausea


                                                 Vomitting


      Last BM                                    none noted


      Difficult in:                              None


      Food Allergies                             No


      Skin Integrity/Comment:                    angelica bee 13


      Current %PO                                Negligible < 25%


     Estimated Nutritional Goals


      BEE in Kcals:                              Using Current wt


      Calories/Kcals/Kg                          30-35


      Kcals Calculated                           9353-5218


      Protein:                                   Using Current wt


      Protein g/k.2-1.5


      Protein Calculated                         78-97 g


      Fluid: ml                                  per MD


     Nutritional Problem


      2. Problem


       Problem                                   Altered nutrition related lab


                                                 values


       Etiology                                  dehydration, endocrine


                                                 dysfunction


       Signs/Symptoms:                           Cl 110, BUN 78, glucose 392,


                                                 -423


      1. Problem


       Problem                                   Increased nutrient needs


       Etiology                                  metabolic stress


       Signs/Symptoms:                           dx of sepsis and pneumonia


     Malnutrition Related to Morbid Obesity


      Malnutrition related to morbid obesity     No


     Intervention/Recommendation


      Comments                                   1. Monitor NPO status and


                                                 advance diet when medically


                                                 appropriate


                                                 2. Consider alternative


                                                 nutrition route if pt unable


                                                 to tolerate PO intake


                                                 3. Monitor wt, nutrition


                                                 related labs, and skin


                                                 integrity


                                                 4. F/U as high risk in 2-3


                                                 days, -


     Expected Outcomes/Goals


      Expected Outcomes/Goals                    1. Pt to start oral intake or


                                                 alternate nutrition route if


                                                 necessary


                                                 2. Wt stability, skin to


                                                 remain intact, nutrition


                                                 related labs to approach


                                                 normal limits


                                                 Reveiwed by Lisa Haas RD

## 2018-11-29 LAB
ANION GAP SERPL CALC-SCNC: 13.2 MMOL/L (ref 7–16)
BASOPHILS # BLD AUTO: 0 TH/CUMM (ref 0–0.2)
BASOPHILS NFR BLD AUTO: 0.4 % (ref 0–2)
BUN SERPL-MCNC: 32 MG/DL (ref 7–25)
CALCIUM SERPL-MCNC: 8.4 MG/DL (ref 8.6–10.3)
CHLORIDE SERPL-SCNC: 109 MEQ/L (ref 98–107)
CO2 SERPL-SCNC: 30.2 MEQ/L (ref 21–31)
CREAT SERPL-MCNC: 1.7 MG/DL (ref 0.6–1.2)
EOSINOPHIL # BLD AUTO: 0.6 TH/CMM (ref 0.1–0.4)
EOSINOPHIL NFR BLD AUTO: 5.9 % (ref 0–5)
ERYTHROCYTE [DISTWIDTH] IN BLOOD BY AUTOMATED COUNT: 13.4 % (ref 11.5–20)
GLUCOSE SERPL-MCNC: 212 MG/DL (ref 70–105)
HCT VFR BLD CALC: 27.6 % (ref 41–60)
HGB BLD-MCNC: 9 GM/DL (ref 12–16)
LYMPHOCYTE AB SER FC-ACNC: 1.5 TH/CMM (ref 1.5–3)
LYMPHOCYTES NFR BLD AUTO: 14.2 % (ref 20–50)
MCH RBC QN AUTO: 28.8 PG (ref 27–31)
MCHC RBC AUTO-ENTMCNC: 32.6 PG (ref 28–36)
MCV RBC AUTO: 88.5 FL (ref 81–100)
MONOCYTES # BLD AUTO: 0.8 TH/CMM (ref 0.3–1)
MONOCYTES NFR BLD AUTO: 7.6 % (ref 2–10)
NEUTROPHILS # BLD: 7.9 TH/CMM (ref 1.8–8)
NEUTROPHILS NFR BLD AUTO: 71.9 % (ref 40–80)
PLATELET # BLD: 271 TH/CMM (ref 150–400)
PMV BLD AUTO: 9.7 FL
POTASSIUM SERPL-SCNC: 3.4 MEQ/L (ref 3.5–5.1)
RBC # BLD AUTO: 3.12 MIL/CMM (ref 3.8–5.2)
SODIUM SERPL-SCNC: 149 MEQ/L (ref 136–145)
WBC # BLD AUTO: 10.8 TH/CMM (ref 4.8–10.8)

## 2018-11-29 RX ADMIN — IPRATROPIUM BROMIDE AND ALBUTEROL SULFATE SCH ML: .5; 3 SOLUTION RESPIRATORY (INHALATION) at 19:43

## 2018-11-29 RX ADMIN — POLYETHYLENE GLYCOL 3350 SCH: 17 POWDER, FOR SOLUTION ORAL at 09:02

## 2018-11-29 RX ADMIN — SODIUM CHLORIDE SCH MLS/HR: 9 INJECTION, SOLUTION INTRAVENOUS at 08:57

## 2018-11-29 RX ADMIN — POLYETHYLENE GLYCOL 3350 SCH: 17 POWDER, FOR SOLUTION ORAL at 16:19

## 2018-11-29 RX ADMIN — MORPHINE SULFATE PRN MG: 2 INJECTION, SOLUTION INTRAMUSCULAR; INTRAVENOUS at 03:49

## 2018-11-29 RX ADMIN — IPRATROPIUM BROMIDE AND ALBUTEROL SULFATE SCH ML: .5; 3 SOLUTION RESPIRATORY (INHALATION) at 14:28

## 2018-11-29 RX ADMIN — DEXTROSE AND SODIUM CHLORIDE SCH MLS/HR: 5; .45 INJECTION, SOLUTION INTRAVENOUS at 07:42

## 2018-11-29 RX ADMIN — IPRATROPIUM BROMIDE AND ALBUTEROL SULFATE SCH ML: .5; 3 SOLUTION RESPIRATORY (INHALATION) at 02:31

## 2018-11-29 RX ADMIN — IPRATROPIUM BROMIDE AND ALBUTEROL SULFATE SCH ML: .5; 3 SOLUTION RESPIRATORY (INHALATION) at 23:43

## 2018-11-29 RX ADMIN — SODIUM CHLORIDE SCH MLS/HR: 9 INJECTION, SOLUTION INTRAVENOUS at 02:09

## 2018-11-29 RX ADMIN — MORPHINE SULFATE PRN MG: 2 INJECTION, SOLUTION INTRAMUSCULAR; INTRAVENOUS at 10:13

## 2018-11-29 RX ADMIN — IPRATROPIUM BROMIDE AND ALBUTEROL SULFATE SCH ML: .5; 3 SOLUTION RESPIRATORY (INHALATION) at 10:13

## 2018-11-29 RX ADMIN — SODIUM CHLORIDE SCH MLS/HR: 9 INJECTION, SOLUTION INTRAVENOUS at 14:01

## 2018-11-29 RX ADMIN — INSULIN ASPART SCH UNITS: 100 INJECTION, SOLUTION INTRAVENOUS; SUBCUTANEOUS at 06:34

## 2018-11-29 RX ADMIN — INSULIN ASPART SCH UNITS: 100 INJECTION, SOLUTION INTRAVENOUS; SUBCUTANEOUS at 11:34

## 2018-11-29 RX ADMIN — INSULIN ASPART SCH UNITS: 100 INJECTION, SOLUTION INTRAVENOUS; SUBCUTANEOUS at 17:09

## 2018-11-29 RX ADMIN — SODIUM CHLORIDE SCH MLS/HR: 9 INJECTION, SOLUTION INTRAVENOUS at 20:05

## 2018-11-29 RX ADMIN — INSULIN ASPART SCH UNITS: 100 INJECTION, SOLUTION INTRAVENOUS; SUBCUTANEOUS at 00:17

## 2018-11-29 RX ADMIN — Medication SCH EACH: at 08:58

## 2018-11-29 RX ADMIN — IPRATROPIUM BROMIDE AND ALBUTEROL SULFATE SCH ML: .5; 3 SOLUTION RESPIRATORY (INHALATION) at 06:37

## 2018-11-29 NOTE — INFECTIOUS DISEASE PROG NOTE
Infectious Disease Subjective





- Review of Systems


Service Date: 18


Subjective: 


 


NO  fever.





Infectious Disease Objective





- Results


Result Diagrams: 


 18 04:51





 18 04:51


Recent Labs: 


 Laboratory Last Values











WBC  10.8 Th/cmm (4.8-10.8)   18  04:35    


 


RBC  3.12 Mil/cmm (3.80-5.20)  L  18  04:35    


 


Hgb  9.0 gm/dL (12-16)  L  18  04:35    


 


Hct  27.6 % (41.0-60)  L  18  04:35    


 


MCV  88.5 fl ()   18  04:35    


 


MCH  28.8 pg (27.0-31.0)   18  04:35    


 


MCHC Differential  32.6 pg (28.0-36.0)   18  04:35    


 


RDW  13.4 % (11.5-20.0)   18  04:35    


 


Plt Count  271 Th/cmm (150-400)   18  04:35    


 


MPV  9.7 fl  18  04:35    


 


Add Manual Diff  YES   18  06:10    


 


Neutrophils %  71.9 % (40.0-80.0)   18  04:35    


 


Band Neutrophils %  3 % (0-10)   18  04:45    


 


Lymphocytes %  14.2 % (20.0-50.0)  L  18  04:35    


 


Monocytes %  7.6 % (2.0-10.0)   18  04:35    


 


Eosinophils %  5.9 % (0.0-5.0)  H  18  04:35    


 


Basophils %  0.4 % (0.0-2.0)   18  04:35    


 


Neutrophils (Manual)  75 % (40-80)   18  04:45    


 


Lymphocytes  18 % (20-50)  L  18  04:45    


 


Monocytes  3 % (2-10)   18  04:45    


 


Eosinophils  1 % (0-5)   18  04:45    


 


Basophils  1 % (0-3)   18  04:45    


 


Platelet Estimate  ADEQUATE  (NORMAL)   18  04:45    


 


Anisocytosis  1+   18  06:10    


 


PT  10.6 SECONDS (9.5-11.5)   18  04:45    


 


INR  1.02  (0.5-1.4)   18  04:45    


 


PTT (Actin FS)  22.1 SECONDS (26.0-38.0)  L  18  19:40    


 


Specimen Source  arterial   18  11:45    


 


Sample Site  rr   18  11:45    


 


pH  7.46  (7.35-7.45)  H  18  11:45    


 


pCO2  43.0 mmHg (35.0-45.0)   18  11:45    


 


pO2  81.0 mmHg (80.0-100.0)   18  11:45    


 


HCO3  29.7 mEq/L (20.0-26.0)  H  18  11:45    


 


Base Excess  6.1 mEq/L (-3.0-3.0)  H  18  11:45    


 


O2 Saturation  96.0 % (92.0-100.0)   18  11:45    


 


Boubacar Test  pos   18  11:45    


 


Vent Rate  na   18  11:45    


 


Inspired O2  40   18  11:45    


 


Tidal Volume  na   18  11:45    


 


PEEP  na   18  11:45    


 


Pressure (ins/psv/peep)  na   18  11:45    


 


Critical Value  rninofranco rcp   18  11:45    


 


Sodium  149 mEq/L (136-145)  H  18  04:35    


 


Potassium  3.4 mEq/L (3.5-5.1)  L  18  04:35    


 


Chloride  109 mEq/L ()  H  18  04:35    


 


Carbon Dioxide  30.2 mEq/L (21.0-31.0)   18  04:35    


 


Anion Gap  13.2  (7.0-16.0)   18  04:35    


 


BUN  32 mg/dL (7-25)  H  18  04:35    


 


Creatinine  1.7 mg/dL (0.6-1.2)  H  18  04:35    


 


Est GFR ( Amer)  TNP   18  04:35    


 


Est GFR (Non-Af Amer)  TNP   18  04:35    


 


BUN/Creatinine Ratio  18.8   18  04:35    


 


Glucose  212 mg/dL ()  H  18  04:35    


 


POC Glucose  221 MG/DL (70 - 105)  H  18  06:23    


 


Whole Bld Lactic Acid  2.46 mmol/L (0.60-1.99)  H*  18  10:07    


 


Calcium  8.4 mg/dL (8.6-10.3)  L  18  04:35    


 


Phosphorus  2.8 mg/dL (2.5-5.0)   18  04:35    


 


Magnesium  2.2 mg/dL (1.9-2.7)   18  04:45    


 


Total Bilirubin  0.4 mg/dL (0.3-1.0)   18  07:42    


 


AST  11 U/L (13-39)  L  18  07:42    


 


ALT  9 U/L (7-52)   18  07:42    


 


Alkaline Phosphatase  70 U/L ()   18  07:42    


 


B-Natriuretic Peptide  1920.0 pg/mL (5.0-100.0)  H  18  04:20    


 


Total Protein  4.4 gm/dL (6.0-8.3)  L  18  07:42    


 


Albumin  2.3 gm/dL (3.7-5.3)  L  18  07:42    


 


Globulin  2.1 gm/dL  18  07:42    


 


Albumin/Globulin Ratio  1.1  (1.0-1.8)   18  07:42    


 


Triglycerides  126 mg/dL (<150)   18  04:10    


 


Cholesterol  124 mg/dL (<200)   18  04:10    


 


LDL Cholesterol Direct  84 mg/dL ()   18  04:10    


 


HDL Cholesterol  30 mg/dL (23-92)   18  04:10    


 


TSH  0.75 uIU/ml (0.34-5.60)   18  04:10    


 


Vancomycin Trough  17.9 ug/mL (5-10)  H  18  20:27    


 


Helicobacter pylori Ab  NEGATIVE  (NEGATIVE)   18  08:35    


 


Blood Type  O POSITIVE   18  19:40    


 


Antibody Screen  NEGATIVE   18  19:40    


 


Crossmatch  See Detail   18  19:40    














- Physical Exam


Vitals and I&O: 


 Vital Signs











Temp  97.6 F   18 08:00


 


Pulse  81   18 10:13


 


Resp  21   18 10:13


 


BP  156/63   18 09:00


 


Pulse Ox  95   18 10:13








 Intake & Output











 18





 18:59 06:59 18:59


 


Intake Total 1235 950 929.333


 


Output Total 3225 450 


 


Balance -1990 500 929.333


 


Weight (lbs) 73.663 kg 74.389 kg 


 


Intake:   


 


  Intake, IV Amount 100 350 929.333


 


    D5-0.45NS 1,000 ml @ 40 0  929.333





    mls/hr IV .Q24H [facility] Rx#:   





    [id number]   


 


    Piperacillin Sodium/ 100 100 





    Tazobact 2.25 gm In   





    Sodium Chloride 0.9% 50   





    ml @ 100 mls/hr IV Q6H   





    [facility] Rx#:[id number]   


 


    Vancomycin HCl 1.25 gm In  250 





    Sodium Chloride 0.9% 250   





    ml @ 165 mls/hr IV Q36H   





    [facility] Rx#:[id number]   


 


  Oral 0  


 


  Tube Feeding 895 600 


 


  Other 240  


 


Output:   


 


  Urine 3225 450 


 


Other:   


 


  # Bowel Movements 2 1 


 


  Stool Characteristics Soft  





 Liquid  





 Green  


 


  Weight Source Bedscale Bedscale 











Active Medications: 


Current Medications





Acetaminophen (Tylenol)  650 mg PO Q4HR PRN


   PRN Reason: MILD PAIN 1-3


   Stop: 19 17:58


   Last Admin: 18 11:15 Dose:  650 mg


Acetaminophen (Tylenol)  650 mg PO Q4HR PRN


   PRN Reason: TEMP >100.4


   Stop: 19 17:58


Acetylcysteine (Mucomyst 20%)  3 ml HHN Q4HRT [facility]


   Stop: 19 14:59


   Last Admin: 18 10:13 Dose:  3 ml


Albuterol/Ipratropium (Duoneb Neb)  3 ml HHN Q4HRT [facility]


   Stop: 19 14:59


   Last Admin: 18 10:13 Dose:  3 ml


Amlodipine Besylate (Norvasc)  5 mg PO DAILY [facility]


   Stop: 19 08:59


   Last Admin: 18 09:00 Dose:  5 mg


Ascorbic Acid (Vitamin C)  500 mg PO DAILY [facility]


   Stop: 19 08:59


   Last Admin: 18 08:58 Dose:  500 mg


Atorvastatin Calcium (Lipitor)  10 mg PO HS [facility]; Protocol


   Stop: 19 20:59


   Last Admin: 18 20:56 Dose:  10 mg


Bisacodyl (Dulcolax 10 Mg Supp)  10 mg RC DAILY PRN


   PRN Reason: IF MOM INEFFECTIVE


   Stop: 19 17:58


Furosemide (Lasix)  40 mg IVP DAILY [facility]


   Stop: 19 08:59


   Last Admin: 18 08:59 Dose:  40 mg


Diltiazem HCl 125 mg/ Dextrose  100 mls @ 0 mls/hr IV Q8H PRN; Protocol


   PRN Reason: blood pressure


   Stop: 19 07:48


   Last Admin: 18 09:00 Dose:  6.25 mg/hr, 5 mls/hr


Piperacillin Sod/Tazobactam (Sod 2.25 gm/ Sodium Chloride)  50 mls @ 100 mls/hr 

IV Q6H [facility]


   Stop: 19 13:59


   Last Admin: 18 08:57 Dose:  100 mls/hr


Dextrose/Sodium Chloride (D5-0.45ns)  1,000 mls @ 40 mls/hr IV .Q24H [facility]


   Stop: 19 00:00


   Last Admin: 18 07:42 Dose:  40 mls/hr


Vancomycin HCl 1.25 gm/ Sodium (Chloride)  250 mls @ 165 mls/hr IV Q36H [facility]


   Stop: 19 08:59


   Last Infusion: 18 22:30 Dose:  Infused


Insulin Aspart (Novolog Insulin Sliding Scale)  0 units SUBQ Q6HR [facility]; Protocol


   Stop: 19 01:14


   Last Admin: 18 06:34 Dose:  5 units


Lactobacillus Rhamnosus (Culturelle 15b)  1 each PO DAILY [facility]


   Stop: 19 08:59


   Last Admin: 18 08:58 Dose:  1 each


Lorazepam (Ativan)  1 mg IVP Q4HR PRN; Protocol


   PRN Reason: Agitation


   Stop: 19 07:12


   Last Admin: 18 12:17 Dose:  1 mg


Miscellaneous (Probiotic Screen)  1 ea  PRN PRN


   PRN Reason: PROTOCOL


   Stop: 19 15:29


Miscellaneous (Vancomycin Iv Per Pharmacy)  1 ea  PRN [facility]


   Stop: 19 23:44


Miscellaneous (Zosyn Iv Per Pharmacy)  1 ea  PRN PRN


   PRN Reason: PROTOCOL


   Stop: 19 12:03


Morphine Sulfate (Morphine)  1 mg IVP Q4HR PRN


   PRN Reason: Pain (Moderate)


   Stop: 19 10:04


   Last Admin: 18 10:13 Dose:  1 mg


Pantoprazole Sodium (Protonix)  40 mg IVP BID [facility]


   Stop: 19 08:59


   Last Admin: 18 08:59 Dose:  40 mg


Polyethylene Glycol (Miralax)  17 gm PO BID [facility]


   Stop: 19 08:59


   Last Admin: 18 09:02 Dose:  Not Given








General: no acute distress, well developed, well nourished


HEENT: atraumatic, normocephalic, PERRLA, EOMI


Neck: supple, no thyromegaly, no lymphadenopathy


Cardiovascular: S1S2, regular


Lungs: clear to auscultation bilaterally, clear to percussion


Abdomen: soft, no tender, no distended


Extremities: no cyanosis, no clubbing, no edema


Neurological: awake, alert, oriented


Skin: intact





- Procedures


Procedures: 


 Procedures











Procedure Code Date


 


EXCISION OF STOMACH, PYLORUS, ENDO, DIAGN [id number] 18


 


INSERTION OF FEEDING DEVICE INTO STOMACH, ENDO [id number] 18


 


RESPIRATORY VENTILATION, GREATER THAN 96 CONSECUTIVE HOURS [id number] 18














Infectious Disease Assmt/Plan





- Problem List


Patient Problems: 


All Active Problems





COFFEE GROUND EMESIS (Acute) 











- Assessment


Assessment: 





1.  Severe sepsis.


2.  Pneumonia.


3.  Fecal impaction.


4.  Gallbladder stone at bladder neck.


5.  Diabetes mellitus type 2.


6.  Hypertension.


7.  Dementia.


8.  Anemia.


9.  Large hiatal hernia.


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 


11. GI bleed 2/2 PUD.


12. SVT


13. VDRF.


14. S/p PEG placement, POD #1.


15. Hypokalemia.





- Plan


Plan: 





Continue zosyn.


Continue vancomycin IV


Bactroban nasal ointment.


K supplementaion.


Will ask for LTAC eval.





Nutritional Asmnt/Malnutr-PDOC





- Dietary Evaluation


Malnutrition Findings (Please click <Entered> for more info): 








Nutritional Asmnt/Malnutrition                             Start:  18 15:

21


Text:                                                      Status: Complete    

  


Freq:                                                                          

  


Protocol:                                                                      

  


 Document     18 15:22  NICOLEFORREST  (Rec: 18 15:44  [email or web address])


 Nutritional Asmnt/Malnutrition


     Patient General Information


      Nutritional Screening                      High Risk


      Diagnosis                                  sepsis, dehydration, pneumonia


      Pertinent Medical Hx/Surgical Hx           HTN, DM, dyslipidemia,


                                                 dementia, anemia, muscle


                                                 weakness


      Subjective Information                     Pt receiving care from RN at


                                                 time of visit. Per nurse notes


                                                 , pt had coffee ground emesis,


                                                 NGT was unable to be inserted


                                                 d/t large retro cardiac


                                                 hiatal hernia, and will have


                                                 EGD tomorrow. Spoke w/ LEVON Lebron by phone who verfied


                                                 the nurse notes and states pt


                                                 will continue to be NPO.


      Current Diet Order/ Nutrition Support      NPO


      Pertinent Medications                      D5-0.45ns, novolog, culturelle


                                                 , pantoprazole


      Pertinent Labs                             : glucose 392, Na 144, Cl


                                                 110, BUN 78, Alb 2.9, 


                                                 -423


                                                 : glucose 547, Na 134, Cl


                                                 97, BUN 69, Alb 3.0, -


                                                 485


     Nutritional Hx/Data


      Height                                     1.63 m


      Height (Calculated Centimeters)            162.6


      Current Weight (lbs)                       64.682 kg


      Weight (Calculated Kilograms)              64.7


      Weight (Calculated Grams)                  54842.3


      Ideal Body Weight                          120 lb


      Body Mass Index (BMI)                      24.5


      Weight Status                              Approriate


     GI Symptoms


      GI Symptoms                                Nausea


                                                 Vomitting


      Last BM                                    none noted


      Difficult in:                              None


      Food Allergies                             No


      Skin Integrity/Comment:                    angelica bee 13


      Current %PO                                Negligible < 25%


     Estimated Nutritional Goals


      BEE in Kcals:                              Using Current wt


      Calories/Kcals/Kg                          30-35


      Kcals Calculated                           0393-6438


      Protein:                                   Using Current wt


      Protein g/k.2-1.5


      Protein Calculated                         78-97 g


      Fluid: ml                                  per MD


     Nutritional Problem


      2. Problem


       Problem                                   Altered nutrition related lab


                                                 values


       Etiology                                  dehydration, endocrine


                                                 dysfunction


       Signs/Symptoms:                           Cl 110, BUN 78, glucose 392,


                                                 -423


      1. Problem


       Problem                                   Increased nutrient needs


       Etiology                                  metabolic stress


       Signs/Symptoms:                           dx of sepsis and pneumonia


     Malnutrition Related to Morbid Obesity


      Malnutrition related to morbid obesity     No


     Intervention/Recommendation


      Comments                                   1. Monitor NPO status and


                                                 advance diet when medically


                                                 appropriate


                                                 2. Consider alternative


                                                 nutrition route if pt unable


                                                 to tolerate PO intake


                                                 3. Monitor wt, nutrition


                                                 related labs, and skin


                                                 integrity


                                                 4. F/U as high risk in 2-3


                                                 days, -


     Expected Outcomes/Goals


      Expected Outcomes/Goals                    1. Pt to start oral intake or


                                                 alternate nutrition route if


                                                 necessary


                                                 2. Wt stability, skin to


                                                 remain intact, nutrition


                                                 related labs to approach


                                                 normal limits


                                                 Reveiwed by Lisa Haas RD

## 2018-11-29 NOTE — DIAGNOSTIC IMAGING REPORT
Portable chest x-ray



HISTORY: Shortness of breath



Compared with prior exam of 11/27/2018, there has developed infiltrate

within the right lower lobe.  Pneumonia cannot be excluded.  Clinical

correlation is needed.



The heart remains enlarged.



IMPRESSION:

1.  New infiltrate right lower lobe.  Pneumonia cannot be excluded. 

Clinical correlation is needed.

## 2018-11-30 LAB
ALBUMIN SERPL-MCNC: 2.5 GM/DL (ref 3.7–5.3)
ALBUMIN/GLOB SERPL: 1 {RATIO} (ref 1–1.8)
ALP SERPL-CCNC: 74 U/L (ref 34–104)
ALT SERPL-CCNC: 6 U/L (ref 7–52)
ANION GAP SERPL CALC-SCNC: 10.7 MMOL/L (ref 7–16)
AST SERPL-CCNC: 9 U/L (ref 13–39)
BASOPHILS # BLD AUTO: 0 TH/CUMM (ref 0–0.2)
BASOPHILS NFR BLD AUTO: 0.1 % (ref 0–2)
BILIRUB SERPL-MCNC: 0.3 MG/DL (ref 0.3–1)
BUN SERPL-MCNC: 33 MG/DL (ref 7–25)
CALCIUM SERPL-MCNC: 8.2 MG/DL (ref 8.6–10.3)
CHLORIDE SERPL-SCNC: 106 MEQ/L (ref 98–107)
CO2 SERPL-SCNC: 32.4 MEQ/L (ref 21–31)
CREAT SERPL-MCNC: 1.7 MG/DL (ref 0.6–1.2)
EOSINOPHIL # BLD AUTO: 0.5 TH/CMM (ref 0.1–0.4)
EOSINOPHIL NFR BLD AUTO: 5 % (ref 0–5)
ERYTHROCYTE [DISTWIDTH] IN BLOOD BY AUTOMATED COUNT: 13.4 % (ref 11.5–20)
GLOBULIN SER-MCNC: 2.6 GM/DL
GLUCOSE SERPL-MCNC: 238 MG/DL (ref 70–105)
HCT VFR BLD CALC: 25.8 % (ref 41–60)
HGB BLD-MCNC: 8.2 GM/DL (ref 12–16)
LYMPHOCYTE AB SER FC-ACNC: 1.7 TH/CMM (ref 1.5–3)
LYMPHOCYTES NFR BLD AUTO: 16.3 % (ref 20–50)
MCH RBC QN AUTO: 28.1 PG (ref 27–31)
MCHC RBC AUTO-ENTMCNC: 31.7 PG (ref 28–36)
MCV RBC AUTO: 88.6 FL (ref 81–100)
MONOCYTES # BLD AUTO: 0.9 TH/CMM (ref 0.3–1)
MONOCYTES NFR BLD AUTO: 8.6 % (ref 2–10)
NEUTROPHILS # BLD: 7.4 TH/CMM (ref 1.8–8)
NEUTROPHILS NFR BLD AUTO: 70 % (ref 40–80)
PLATELET # BLD: 286 TH/CMM (ref 150–400)
PMV BLD AUTO: 10 FL
POTASSIUM SERPL-SCNC: 4.1 MEQ/L (ref 3.5–5.1)
RBC # BLD AUTO: 2.91 MIL/CMM (ref 3.8–5.2)
SODIUM SERPL-SCNC: 145 MEQ/L (ref 136–145)
WBC # BLD AUTO: 10.5 TH/CMM (ref 4.8–10.8)

## 2018-11-30 RX ADMIN — POLYETHYLENE GLYCOL 3350 SCH GM: 17 POWDER, FOR SOLUTION ORAL at 08:11

## 2018-11-30 RX ADMIN — INSULIN ASPART SCH UNITS: 100 INJECTION, SOLUTION INTRAVENOUS; SUBCUTANEOUS at 17:31

## 2018-11-30 RX ADMIN — IPRATROPIUM BROMIDE AND ALBUTEROL SULFATE SCH ML: .5; 3 SOLUTION RESPIRATORY (INHALATION) at 02:06

## 2018-11-30 RX ADMIN — IPRATROPIUM BROMIDE AND ALBUTEROL SULFATE SCH ML: .5; 3 SOLUTION RESPIRATORY (INHALATION) at 07:04

## 2018-11-30 RX ADMIN — SODIUM CHLORIDE SCH MLS/HR: 9 INJECTION, SOLUTION INTRAVENOUS at 21:00

## 2018-11-30 RX ADMIN — IPRATROPIUM BROMIDE AND ALBUTEROL SULFATE SCH ML: .5; 3 SOLUTION RESPIRATORY (INHALATION) at 23:33

## 2018-11-30 RX ADMIN — MORPHINE SULFATE PRN MG: 2 INJECTION, SOLUTION INTRAMUSCULAR; INTRAVENOUS at 11:40

## 2018-11-30 RX ADMIN — DEXTROSE AND SODIUM CHLORIDE SCH MLS/HR: 5; .45 INJECTION, SOLUTION INTRAVENOUS at 12:18

## 2018-11-30 RX ADMIN — POLYETHYLENE GLYCOL 3350 SCH GM: 17 POWDER, FOR SOLUTION ORAL at 17:10

## 2018-11-30 RX ADMIN — Medication SCH EACH: at 08:11

## 2018-11-30 RX ADMIN — MORPHINE SULFATE PRN MG: 2 INJECTION, SOLUTION INTRAMUSCULAR; INTRAVENOUS at 03:00

## 2018-11-30 RX ADMIN — IPRATROPIUM BROMIDE AND ALBUTEROL SULFATE SCH ML: .5; 3 SOLUTION RESPIRATORY (INHALATION) at 10:49

## 2018-11-30 RX ADMIN — MORPHINE SULFATE PRN MG: 2 INJECTION, SOLUTION INTRAMUSCULAR; INTRAVENOUS at 16:05

## 2018-11-30 RX ADMIN — MORPHINE SULFATE PRN MG: 2 INJECTION, SOLUTION INTRAMUSCULAR; INTRAVENOUS at 21:13

## 2018-11-30 RX ADMIN — INSULIN ASPART SCH UNITS: 100 INJECTION, SOLUTION INTRAVENOUS; SUBCUTANEOUS at 06:37

## 2018-11-30 RX ADMIN — INSULIN ASPART SCH UNITS: 100 INJECTION, SOLUTION INTRAVENOUS; SUBCUTANEOUS at 12:11

## 2018-11-30 RX ADMIN — SODIUM CHLORIDE SCH MLS/HR: 9 INJECTION, SOLUTION INTRAVENOUS at 01:02

## 2018-11-30 RX ADMIN — INSULIN ASPART SCH UNITS: 100 INJECTION, SOLUTION INTRAVENOUS; SUBCUTANEOUS at 00:55

## 2018-11-30 RX ADMIN — IPRATROPIUM BROMIDE AND ALBUTEROL SULFATE SCH ML: .5; 3 SOLUTION RESPIRATORY (INHALATION) at 20:08

## 2018-11-30 RX ADMIN — IPRATROPIUM BROMIDE AND ALBUTEROL SULFATE SCH ML: .5; 3 SOLUTION RESPIRATORY (INHALATION) at 14:43

## 2018-11-30 RX ADMIN — SODIUM CHLORIDE SCH MLS/HR: 9 INJECTION, SOLUTION INTRAVENOUS at 08:10

## 2018-11-30 RX ADMIN — SODIUM CHLORIDE SCH MLS/HR: 9 INJECTION, SOLUTION INTRAVENOUS at 14:00

## 2018-11-30 NOTE — INFECTIOUS DISEASE PROG NOTE
Infectious Disease Subjective





- Review of Systems


Service Date: 18


Subjective: 


 


NO  fever.





Infectious Disease Objective





- Results


Result Diagrams: 


 18 04:51





 18 04:51


Recent Labs: 


 Laboratory Last Values











WBC  10.5 Th/cmm (4.8-10.8)   18  04:51    


 


RBC  2.91 Mil/cmm (3.80-5.20)  L  18  04:51    


 


Hgb  8.2 gm/dL (12-16)  L  18  04:51    


 


Hct  25.8 % (41.0-60)  L  18  04:51    


 


MCV  88.6 fl ()   18  04:51    


 


MCH  28.1 pg (27.0-31.0)   18  04:51    


 


MCHC Differential  31.7 pg (28.0-36.0)   18  04:51    


 


RDW  13.4 % (11.5-20.0)   18  04:51    


 


Plt Count  286 Th/cmm (150-400)   18  04:51    


 


MPV  10.0 fl  18  04:51    


 


Add Manual Diff  YES   18  06:10    


 


Neutrophils %  70.0 % (40.0-80.0)   18  04:51    


 


Band Neutrophils %  3 % (0-10)   18  04:45    


 


Lymphocytes %  16.3 % (20.0-50.0)  L  18  04:51    


 


Monocytes %  8.6 % (2.0-10.0)   18  04:51    


 


Eosinophils %  5.0 % (0.0-5.0)   18  04:51    


 


Basophils %  0.1 % (0.0-2.0)   18  04:51    


 


Neutrophils (Manual)  75 % (40-80)   18  04:45    


 


Lymphocytes  18 % (20-50)  L  18  04:45    


 


Monocytes  3 % (2-10)   18  04:45    


 


Eosinophils  1 % (0-5)   18  04:45    


 


Basophils  1 % (0-3)   18  04:45    


 


Platelet Estimate  ADEQUATE  (NORMAL)   18  04:45    


 


Anisocytosis  1+   18  06:10    


 


PT  10.6 SECONDS (9.5-11.5)   18  04:45    


 


INR  1.02  (0.5-1.4)   18  04:45    


 


PTT (Actin FS)  22.1 SECONDS (26.0-38.0)  L  18  19:40    


 


Specimen Source  arterial   18  11:45    


 


Sample Site  rr   18  11:45    


 


pH  7.46  (7.35-7.45)  H  18  11:45    


 


pCO2  43.0 mmHg (35.0-45.0)   18  11:45    


 


pO2  81.0 mmHg (80.0-100.0)   18  11:45    


 


HCO3  29.7 mEq/L (20.0-26.0)  H  18  11:45    


 


Base Excess  6.1 mEq/L (-3.0-3.0)  H  18  11:45    


 


O2 Saturation  96.0 % (92.0-100.0)   18  11:45    


 


Boubacar Test  pos   18  11:45    


 


Vent Rate  na   18  11:45    


 


Inspired O2  40   18  11:45    


 


Tidal Volume  na   18  11:45    


 


PEEP  na   18  11:45    


 


Pressure (ins/psv/peep)  na   18  11:45    


 


Critical Value  rninofranco rcp   18  11:45    


 


Sodium  145 mEq/L (136-145)   18  04:51    


 


Potassium  4.1 mEq/L (3.5-5.1)   18  04:51    


 


Chloride  106 mEq/L ()   18  04:51    


 


Carbon Dioxide  32.4 mEq/L (21.0-31.0)  H  18  04:51    


 


Anion Gap  10.7  (7.0-16.0)   18  04:51    


 


BUN  33 mg/dL (7-25)  H  18  04:51    


 


Creatinine  1.7 mg/dL (0.6-1.2)  H  18  04:51    


 


Est GFR ( Amer)  TNP   18  04:51    


 


Est GFR (Non-Af Amer)  TNP   18  04:51    


 


BUN/Creatinine Ratio  19.4   18  04:51    


 


Glucose  238 mg/dL ()  H  18  04:51    


 


POC Glucose  237 MG/DL (70 - 105)  H  18  05:41    


 


Whole Bld Lactic Acid  2.46 mmol/L (0.60-1.99)  H*  18  10:07    


 


Calcium  8.2 mg/dL (8.6-10.3)  L  18  04:51    


 


Phosphorus  2.8 mg/dL (2.5-5.0)   18  04:35    


 


Magnesium  2.2 mg/dL (1.9-2.7)   18  04:45    


 


Total Bilirubin  0.3 mg/dL (0.3-1.0)   18  04:51    


 


AST  9 U/L (13-39)  L  18  04:51    


 


ALT  6 U/L (7-52)  L  18  04:51    


 


Alkaline Phosphatase  74 U/L ()   18  04:51    


 


B-Natriuretic Peptide  1730.0 pg/mL (5.0-100.0)  H  18  04:51    


 


Total Protein  5.1 gm/dL (6.0-8.3)  L  18  04:51    


 


Albumin  2.5 gm/dL (3.7-5.3)  L  18  04:51    


 


Globulin  2.6 gm/dL  18  04:51    


 


Albumin/Globulin Ratio  1.0  (1.0-1.8)   18  04:51    


 


Triglycerides  126 mg/dL (<150)   18  04:10    


 


Cholesterol  124 mg/dL (<200)   18  04:10    


 


LDL Cholesterol Direct  84 mg/dL ()   18  04:10    


 


HDL Cholesterol  30 mg/dL (23-92)   18  04:10    


 


TSH  0.75 uIU/ml (0.34-5.60)   18  04:10    


 


Vancomycin Trough  17.9 ug/mL (5-10)  H  18  20:27    


 


Helicobacter pylori Ab  NEGATIVE  (NEGATIVE)   18  08:35    


 


Blood Type  O POSITIVE   18  19:40    


 


Antibody Screen  NEGATIVE   18  19:40    


 


Crossmatch  See Detail   18  19:40    














- Physical Exam


Vitals and I&O: 


 Vital Signs











Temp  97.7 F   18 08:00


 


Pulse  93   18 10:50


 


Resp  18   18 10:50


 


BP  164/99   18 10:00


 


Pulse Ox  94   18 10:50








 Intake & Output











 18





 18:59 06:59 18:59


 


Intake Total 1879.333 900 


 


Output Total 1250 650 


 


Balance 629.333 250 


 


Weight (lbs) 74.389 kg 69.989 kg 


 


Intake:   


 


  Intake, IV Amount 1029.333 100 


 


    D5-0.45NS 1,000 ml @ 40 929.333  





    mls/hr IV .Q24H [facility] Rx#:   





    [id number]   


 


    Piperacillin Sodium/ 100 100 





    Tazobact 2.25 gm In   





    Sodium Chloride 0.9% 50   





    ml @ 100 mls/hr IV Q6H   





    [facility] Rx#:[id number]   


 


  Tube Feeding 600 600 


 


  Other 250 200 


 


Output:   


 


  Urine 1250 650 


 


Other:   


 


  # Bowel Movements 2 1 


 


  Weight Source Bedscale Bedscale 











Active Medications: 


Current Medications





Acetaminophen (Tylenol)  650 mg PO Q4HR PRN


   PRN Reason: MILD PAIN 1-3


   Stop: 19 17:58


   Last Admin: 18 11:15 Dose:  650 mg


Acetaminophen (Tylenol)  650 mg PO Q4HR PRN


   PRN Reason: TEMP >100.4


   Stop: 19 17:58


Acetylcysteine (Mucomyst 20%)  3 ml HHN Q4HRT [facility]


   Stop: 19 14:59


   Last Admin: 18 10:50 Dose:  3 ml


Albuterol/Ipratropium (Duoneb Neb)  3 ml HHN Q4HRT [facility]


   Stop: 19 14:59


   Last Admin: 18 10:49 Dose:  3 ml


Amlodipine Besylate (Norvasc)  5 mg PO DAILY [facility]


   Stop: 19 08:59


   Last Admin: 18 08:11 Dose:  5 mg


Ascorbic Acid (Vitamin C)  500 mg PO DAILY [facility]


   Stop: 19 08:59


   Last Admin: 18 08:11 Dose:  500 mg


Atorvastatin Calcium (Lipitor)  10 mg PO HS [facility]; Protocol


   Stop: 19 20:59


   Last Admin: 18 20:05 Dose:  10 mg


Bisacodyl (Dulcolax 10 Mg Supp)  10 mg RC DAILY PRN


   PRN Reason: IF MOM INEFFECTIVE


   Stop: 19 17:58


Furosemide (Lasix)  40 mg IVP DAILY [facility]


   Stop: 19 08:59


   Last Admin: 18 08:11 Dose:  40 mg


Diltiazem HCl 125 mg/ Dextrose  100 mls @ 0 mls/hr IV Q8H PRN; Protocol


   PRN Reason: blood pressure


   Stop: 19 07:48


   Last Admin: 18 09:00 Dose:  6.25 mg/hr, 5 mls/hr


Piperacillin Sod/Tazobactam (Sod 2.25 gm/ Sodium Chloride)  50 mls @ 100 mls/hr 

IV Q6H [facility]


   Stop: 19 13:59


   Last Admin: 18 08:10 Dose:  100 mls/hr


Dextrose/Sodium Chloride (D5-0.45ns)  1,000 mls @ 40 mls/hr IV .Q24H [facility]


   Stop: 19 00:00


   Last Admin: 18 07:42 Dose:  40 mls/hr


Vancomycin HCl 1.25 gm/ Sodium (Chloride)  250 mls @ 165 mls/hr IV Q36H [facility]


   Stop: 19 08:59


   Last Admin: 18 09:42 Dose:  165 mls/hr


Insulin Aspart (Novolog Insulin Sliding Scale)  0 units SUBQ Q6HR [facility]; Protocol


   Stop: 19 01:14


   Last Admin: 18 06:37 Dose:  5 units


Lactobacillus Rhamnosus (Culturelle 15b)  1 each PO DAILY [facility]


   Stop: 19 08:59


   Last Admin: 18 08:11 Dose:  1 each


Lorazepam (Ativan)  1 mg IVP Q4HR PRN; Protocol


   PRN Reason: Agitation


   Stop: 19 07:12


   Last Admin: 18 20:15 Dose:  1 mg


Miscellaneous (Probiotic Screen)  1 ea  PRN PRN


   PRN Reason: PROTOCOL


   Stop: 19 15:29


Miscellaneous (Vancomycin Iv Per Pharmacy)  1 ea  PRN PATRICE


   Stop: 19 23:44


Miscellaneous (Zosyn Iv Per Pharmacy)  1 [facility] PRN PRN


   PRN Reason: PROTOCOL


   Stop: 19 12:03


Morphine Sulfate (Morphine)  1 mg IVP Q4HR PRN


   PRN Reason: Pain (Moderate)


   Stop: 19 10:04


   Last Admin: 18 03:00 Dose:  1 mg


Pantoprazole Sodium (Protonix)  40 mg IVP BID [facility]


   Stop: 19 08:59


   Last Admin: 18 08:11 Dose:  40 mg


Polyethylene Glycol (Miralax)  17 gm PO BID [facility]


   Stop: 19 08:59


   Last Admin: 18 08:11 Dose:  17 gm








General: no acute distress, well developed, well nourished


HEENT: atraumatic, normocephalic, PERRLA, EOMI


Neck: supple, no thyromegaly


Cardiovascular: S1S2, regular


Lungs: clear to auscultation bilaterally, clear to percussion


Abdomen: soft, no tender, no distended


Extremities: no cyanosis, no clubbing, no edema


Neurological: awake, alert, oriented


Skin: intact





- Procedures


Procedures: 


 Procedures











Procedure Code Date


 


EXCISION OF STOMACH, PYLORUS, ENDO, DIAGN [id number] 18


 


INSERTION OF FEEDING DEVICE INTO STOMACH, ENDO [id number] 18


 


RESPIRATORY VENTILATION, GREATER THAN 96 CONSECUTIVE HOURS [id number] 18














Infectious Disease Assmt/Plan





- Problem List


Patient Problems: 


All Active Problems





COFFEE GROUND EMESIS (Acute) 











- Assessment


Assessment: 





1.  Severe sepsis.


2.  Pneumonia.


3.  Fecal impaction.


4.  Gallbladder stone at bladder neck.


5.  Diabetes mellitus type 2.


6.  Hypertension.


7.  Dementia.


8.  Anemia.


9.  Large hiatal hernia.


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 


11. GI bleed 2/2 PUD.


12. SVT


13. VDRF.


14. S/p PEG placement, POD #1.


15. Hypokalemia.





- Plan


Plan: 





Continue zosyn.


Continue vancomycin IV


Bactroban nasal ointment.


K supplementaion.


Will ask for LTAC eval.





Nutritional Asmnt/Malnutr-PDOC





- Dietary Evaluation


Malnutrition Findings (Please click <Entered> for more info): 








Nutritional Asmnt/Malnutrition                             Start:  18 15:

21


Text:                                                      Status: Complete    

  


Freq:                                                                          

  


Protocol:                                                                      

  


 Document     18 15:22  [place]  (Rec: 18 15:44  DYFORREST  [email or web address])


 Nutritional Asmnt/Malnutrition


     Patient General Information


      Nutritional Screening                      High Risk


      Diagnosis                                  sepsis, dehydration, pneumonia


      Pertinent Medical Hx/Surgical Hx           HTN, DM, dyslipidemia,


                                                 dementia, anemia, muscle


                                                 weakness


      Subjective Information                     Pt receiving care from RN at


                                                 time of visit. Per nurse notes


                                                 , pt had coffee ground emesis,


                                                 NGT was unable to be inserted


                                                 d/t large retro cardiac


                                                 hiatal hernia, and will have


                                                 EGD tomorrow. Spoke w/ LEVON Lebron by phone who verfied


                                                 the nurse notes and states pt


                                                 will continue to be NPO.


      Current Diet Order/ Nutrition Support      NPO


      Pertinent Medications                      D5-0.45ns, novolog, culturelle


                                                 , pantoprazole


      Pertinent Labs                             : glucose 392, Na 144, Cl


                                                 110, BUN 78, Alb 2.9, 


                                                 -423


                                                 : glucose 547, Na 134, Cl


                                                 97, BUN 69, Alb 3.0, -


                                                 485


     Nutritional Hx/Data


      Height                                     1.63 m


      Height (Calculated Centimeters)            162.6


      Current Weight (lbs)                       64.682 kg


      Weight (Calculated Kilograms)              64.7


      Weight (Calculated Grams)                  02638.3


      Ideal Body Weight                          120 lb


      Body Mass Index (BMI)                      24.5


      Weight Status                              Approriate


     GI Symptoms


      GI Symptoms                                Nausea


                                                 Vomitting


      Last BM                                    none noted


      Difficult in:                              None


      Food Allergies                             No


      Skin Integrity/Comment:                    cristal, angelica 13


      Current %PO                                Negligible < 25%


     Estimated Nutritional Goals


      BEE in Kcals:                              Using Current wt


      Calories/Kcals/Kg                          30-35


      Kcals Calculated                           8485-5582


      Protein:                                   Using Current wt


      Protein g/k.2-1.5


      Protein Calculated                         78-97 g


      Fluid: ml                                  per MD


     Nutritional Problem


      2. Problem


       Problem                                   Altered nutrition related lab


                                                 values


       Etiology                                  dehydration, endocrine


                                                 dysfunction


       Signs/Symptoms:                           Cl 110, BUN 78, glucose 392,


                                                 -423


      1. Problem


       Problem                                   Increased nutrient needs


       Etiology                                  metabolic stress


       Signs/Symptoms:                           dx of sepsis and pneumonia


     Malnutrition Related to Morbid Obesity


      Malnutrition related to morbid obesity     No


     Intervention/Recommendation


      Comments                                   1. Monitor NPO status and


                                                 advance diet when medically


                                                 appropriate


                                                 2. Consider alternative


                                                 nutrition route if pt unable


                                                 to tolerate PO intake


                                                 3. Monitor wt, nutrition


                                                 related labs, and skin


                                                 integrity


                                                 4. F/U as high risk in 2-3


                                                 days, -


     Expected Outcomes/Goals


      Expected Outcomes/Goals                    1. Pt to start oral intake or


                                                 alternate nutrition route if


                                                 necessary


                                                 2. Wt stability, skin to


                                                 remain intact, nutrition


                                                 related labs to approach


                                                 normal limits


                                                 Reveiwed by Lisa Haas RD

## 2018-12-01 LAB
ALBUMIN SERPL-MCNC: 2.6 GM/DL (ref 3.7–5.3)
ALBUMIN/GLOB SERPL: 0.8 {RATIO} (ref 1–1.8)
ALP SERPL-CCNC: 72 U/L (ref 34–104)
ALT SERPL-CCNC: 8 U/L (ref 7–52)
ANION GAP SERPL CALC-SCNC: 12.7 MMOL/L (ref 7–16)
AST SERPL-CCNC: 12 U/L (ref 13–39)
BASOPHILS # BLD AUTO: 0.1 TH/CUMM (ref 0–0.2)
BASOPHILS NFR BLD AUTO: 0.8 % (ref 0–2)
BILIRUB SERPL-MCNC: 0.3 MG/DL (ref 0.3–1)
BUN SERPL-MCNC: 35 MG/DL (ref 7–25)
CALCIUM SERPL-MCNC: 8.4 MG/DL (ref 8.6–10.3)
CHLORIDE SERPL-SCNC: 103 MEQ/L (ref 98–107)
CO2 SERPL-SCNC: 33.4 MEQ/L (ref 21–31)
CREAT SERPL-MCNC: 1.6 MG/DL (ref 0.6–1.2)
EOSINOPHIL # BLD AUTO: 0.5 TH/CMM (ref 0.1–0.4)
EOSINOPHIL NFR BLD AUTO: 4.8 % (ref 0–5)
ERYTHROCYTE [DISTWIDTH] IN BLOOD BY AUTOMATED COUNT: 13.6 % (ref 11.5–20)
GLOBULIN SER-MCNC: 3.2 GM/DL
GLUCOSE SERPL-MCNC: 313 MG/DL (ref 70–105)
HCT VFR BLD CALC: 26.9 % (ref 41–60)
HGB BLD-MCNC: 8.6 GM/DL (ref 12–16)
LYMPHOCYTE AB SER FC-ACNC: 1.5 TH/CMM (ref 1.5–3)
LYMPHOCYTES NFR BLD AUTO: 13.5 % (ref 20–50)
MCH RBC QN AUTO: 28.3 PG (ref 27–31)
MCHC RBC AUTO-ENTMCNC: 32.1 PG (ref 28–36)
MCV RBC AUTO: 88.2 FL (ref 81–100)
MONOCYTES # BLD AUTO: 0.8 TH/CMM (ref 0.3–1)
MONOCYTES NFR BLD AUTO: 7.4 % (ref 2–10)
NEUTROPHILS # BLD: 8.1 TH/CMM (ref 1.8–8)
NEUTROPHILS NFR BLD AUTO: 73.5 % (ref 40–80)
PCO2 BLDA: 60 MMHG (ref 35–45)
PLATELET # BLD: 359 TH/CMM (ref 150–400)
PMV BLD AUTO: 9.5 FL
PO2 BLDA: 120 MMHG (ref 80–100)
POTASSIUM SERPL-SCNC: 4.1 MEQ/L (ref 3.5–5.1)
RBC # BLD AUTO: 3.05 MIL/CMM (ref 3.8–5.2)
SAO2 % BLDA: 99 % (ref 92–100)
SODIUM SERPL-SCNC: 145 MEQ/L (ref 136–145)
WBC # BLD AUTO: 11 TH/CMM (ref 4.8–10.8)

## 2018-12-01 RX ADMIN — SODIUM CHLORIDE SCH MLS/HR: 9 INJECTION, SOLUTION INTRAVENOUS at 13:07

## 2018-12-01 RX ADMIN — INSULIN ASPART SCH UNITS: 100 INJECTION, SOLUTION INTRAVENOUS; SUBCUTANEOUS at 06:54

## 2018-12-01 RX ADMIN — POLYETHYLENE GLYCOL 3350 SCH GM: 17 POWDER, FOR SOLUTION ORAL at 17:36

## 2018-12-01 RX ADMIN — SODIUM CHLORIDE SCH MLS/HR: 9 INJECTION, SOLUTION INTRAVENOUS at 08:15

## 2018-12-01 RX ADMIN — DEXTROSE AND SODIUM CHLORIDE SCH MLS/HR: 5; .45 INJECTION, SOLUTION INTRAVENOUS at 17:37

## 2018-12-01 RX ADMIN — SODIUM CHLORIDE SCH MLS/HR: 9 INJECTION, SOLUTION INTRAVENOUS at 19:33

## 2018-12-01 RX ADMIN — IPRATROPIUM BROMIDE AND ALBUTEROL SULFATE SCH ML: .5; 3 SOLUTION RESPIRATORY (INHALATION) at 07:52

## 2018-12-01 RX ADMIN — SODIUM CHLORIDE SCH MLS/HR: 9 INJECTION, SOLUTION INTRAVENOUS at 01:09

## 2018-12-01 RX ADMIN — IPRATROPIUM BROMIDE AND ALBUTEROL SULFATE SCH ML: .5; 3 SOLUTION RESPIRATORY (INHALATION) at 22:16

## 2018-12-01 RX ADMIN — IPRATROPIUM BROMIDE AND ALBUTEROL SULFATE SCH ML: .5; 3 SOLUTION RESPIRATORY (INHALATION) at 04:41

## 2018-12-01 RX ADMIN — METHYLPREDNISOLONE SODIUM SUCCINATE SCH MG: 40 INJECTION, POWDER, FOR SOLUTION INTRAMUSCULAR; INTRAVENOUS at 17:36

## 2018-12-01 RX ADMIN — INSULIN ASPART SCH UNITS: 100 INJECTION, SOLUTION INTRAVENOUS; SUBCUTANEOUS at 17:49

## 2018-12-01 RX ADMIN — IPRATROPIUM BROMIDE AND ALBUTEROL SULFATE SCH ML: .5; 3 SOLUTION RESPIRATORY (INHALATION) at 19:23

## 2018-12-01 RX ADMIN — Medication SCH EACH: at 08:16

## 2018-12-01 RX ADMIN — MORPHINE SULFATE PRN MG: 2 INJECTION, SOLUTION INTRAMUSCULAR; INTRAVENOUS at 05:55

## 2018-12-01 RX ADMIN — INSULIN ASPART SCH UNITS: 100 INJECTION, SOLUTION INTRAVENOUS; SUBCUTANEOUS at 11:48

## 2018-12-01 RX ADMIN — INSULIN ASPART SCH UNITS: 100 INJECTION, SOLUTION INTRAVENOUS; SUBCUTANEOUS at 00:10

## 2018-12-01 RX ADMIN — IPRATROPIUM BROMIDE AND ALBUTEROL SULFATE SCH ML: .5; 3 SOLUTION RESPIRATORY (INHALATION) at 10:50

## 2018-12-01 RX ADMIN — POLYETHYLENE GLYCOL 3350 SCH GM: 17 POWDER, FOR SOLUTION ORAL at 08:15

## 2018-12-01 NOTE — INFECTIOUS DISEASE PROG NOTE
Infectious Disease Subjective





- Review of Systems


Service Date: 18


Subjective: 


 


NO  fever. On 100% NRB. 





Infectious Disease Objective





- Results


Result Diagrams: 


 18 07:39





 18 07:39


Recent Labs: 


 Laboratory Last Values











WBC  11.0 Th/cmm (4.8-10.8)  H  18  07:39    


 


RBC  3.05 Mil/cmm (3.80-5.20)  L  18  07:39    


 


Hgb  8.6 gm/dL (12-16)  L  18  07:39    


 


Hct  26.9 % (41.0-60)  L  18  07:39    


 


MCV  88.2 fl ()   18  07:39    


 


MCH  28.3 pg (27.0-31.0)   18  07:39    


 


MCHC Differential  32.1 pg (28.0-36.0)   18  07:39    


 


RDW  13.6 % (11.5-20.0)   18  07:39    


 


Plt Count  359 Th/cmm (150-400)   18  07:39    


 


MPV  9.5 fl  18  07:39    


 


Add Manual Diff  YES   18  06:10    


 


Neutrophils %  73.5 % (40.0-80.0)   18  07:39    


 


Band Neutrophils %  3 % (0-10)   18  04:45    


 


Lymphocytes %  13.5 % (20.0-50.0)  L  18  07:39    


 


Monocytes %  7.4 % (2.0-10.0)   18  07:39    


 


Eosinophils %  4.8 % (0.0-5.0)   18  07:39    


 


Basophils %  0.8 % (0.0-2.0)   18  07:39    


 


Neutrophils (Manual)  75 % (40-80)   18  04:45    


 


Lymphocytes  18 % (20-50)  L  18  04:45    


 


Monocytes  3 % (2-10)   18  04:45    


 


Eosinophils  1 % (0-5)   18  04:45    


 


Basophils  1 % (0-3)   18  04:45    


 


Platelet Estimate  ADEQUATE  (NORMAL)   18  04:45    


 


Anisocytosis  1+   18  06:10    


 


PT  10.6 SECONDS (9.5-11.5)   18  04:45    


 


INR  1.02  (0.5-1.4)   18  04:45    


 


PTT (Actin FS)  22.1 SECONDS (26.0-38.0)  L  18  19:40    


 


Specimen Source  Arterial   18  10:54    


 


Sample Site  Right Radial   18  10:54    


 


pH  7.44  (7.35-7.45)   18  10:54    


 


pCO2  60.0 mmHg (35.0-45.0)  H*  18  10:54    


 


pO2  120.0 mmHg (80.0-100.0)  H  18  10:54    


 


HCO3  36.0 mEq/L (20.0-26.0)  H  18  10:54    


 


Base Excess  14.1 mEq/L (-3.0-3.0)  H  18  10:54    


 


O2 Saturation  99.0 % (92.0-100.0)   18  10:54    


 


Boubacar Test  Positive   18  10:54    


 


Vent Rate  NA   18  10:54    


 


Inspired O2  100   18  10:54    


 


Tidal Volume  NA   18  10:54    


 


PEEP  NA   18  10:54    


 


Pressure (ins/psv/peep)  NA   18  10:54    


 


Critical Value  LZHANG   18  10:54    


 


Sodium  145 mEq/L (136-145)   18  07:39    


 


Potassium  4.1 mEq/L (3.5-5.1)   18  07:39    


 


Chloride  103 mEq/L ()   18  07:39    


 


Carbon Dioxide  33.4 mEq/L (21.0-31.0)  H  18  07:39    


 


Anion Gap  12.7  (7.0-16.0)   18  07:39    


 


BUN  35 mg/dL (7-25)  H  18  07:39    


 


Creatinine  1.6 mg/dL (0.6-1.2)  H  18  07:39    


 


Est GFR ( Amer)  TNP   18  07:39    


 


Est GFR (Non-Af Amer)  TNP   18  07:39    


 


BUN/Creatinine Ratio  21.9   18  07:39    


 


Glucose  313 mg/dL ()  H  18  07:39    


 


POC Glucose  229 MG/DL (70 - 105)  H  18  17:45    


 


Whole Bld Lactic Acid  2.46 mmol/L (0.60-1.99)  H*  18  10:07    


 


Calcium  8.4 mg/dL (8.6-10.3)  L  18  07:39    


 


Phosphorus  2.8 mg/dL (2.5-5.0)   18  04:35    


 


Magnesium  2.2 mg/dL (1.9-2.7)   18  04:45    


 


Total Bilirubin  0.3 mg/dL (0.3-1.0)   18  07:39    


 


AST  12 U/L (13-39)  L  18  07:39    


 


ALT  8 U/L (7-52)   18  07:39    


 


Alkaline Phosphatase  72 U/L ()   18  07:39    


 


B-Natriuretic Peptide  1730.0 pg/mL (5.0-100.0)  H  18  04:51    


 


Total Protein  5.8 gm/dL (6.0-8.3)  L  18  07:39    


 


Albumin  2.6 gm/dL (3.7-5.3)  L  18  07:39    


 


Globulin  3.2 gm/dL  18  07:39    


 


Albumin/Globulin Ratio  0.8  (1.0-1.8)  L  18  07:39    


 


Triglycerides  126 mg/dL (<150)   18  04:10    


 


Cholesterol  124 mg/dL (<200)   18  04:10    


 


LDL Cholesterol Direct  84 mg/dL ()   18  04:10    


 


HDL Cholesterol  30 mg/dL (23-92)   18  04:10    


 


TSH  0.75 uIU/ml (0.34-5.60)   18  04:10    


 


Vancomycin Trough  22.4 ug/mL (5-10)  H  18  20:00    


 


Helicobacter pylori Ab  NEGATIVE  (NEGATIVE)   18  08:35    


 


Blood Type  O POSITIVE   18  19:40    


 


Antibody Screen  NEGATIVE   18  19:40    


 


Crossmatch  See Detail   18  19:40    














- Physical Exam


Vitals and I&O: 


 Vital Signs











Temp  98 F   18 20:00


 


Pulse  86   18 22:17


 


Resp  18   18 22:17


 


BP  154/88   18 20:00


 


Pulse Ox  95   18 22:17








 Intake & Output











 18





 06:59 18:59 06:59


 


Intake Total 100 2212 50


 


Output Total  2625 


 


Balance 100 -413 50


 


Weight (lbs)  69.853 kg 


 


Intake:   


 


  Intake, IV Amount 100 1100 50


 


    D5-0.45NS 1,000 ml @ 40  1000 





    mls/hr IV .Q24H [facility] Rx#:   





    [id number]   


 


    Piperacillin Sodium/ 100 100 50





    Tazobact 2.25 gm In   





    Sodium Chloride 0.9% 50   





    ml @ 100 mls/hr IV Q6H   





    [facility] Rx#:[id number]   


 


  Oral  0 


 


  Tube Feeding  702 


 


  Other  410 


 


Output:   


 


  Urine  2625 


 


Other:   


 


  # Bowel Movements  1 


 


  Stool Characteristics  Liquid 





  Green 


 


  Weight Source  Bedscale 











Active Medications: 


Current Medications





Acetaminophen (Tylenol)  650 mg PO Q4HR PRN


   PRN Reason: MILD PAIN 1-3


   Stop: 19 17:58


   Last Admin: 18 17:36 Dose:  650 mg


Acetaminophen (Tylenol)  650 mg PO Q4HR PRN


   PRN Reason: TEMP >100.4


   Stop: 19 17:58


Acetylcysteine (Mucomyst 20%)  3 ml HHN Q4HRT [facility]


   Stop: 19 14:59


   Last Admin: 18 22:17 Dose:  3 ml


Albuterol/Ipratropium (Duoneb Neb)  3 ml HHN Q4HRT [facility]


   Stop: 19 14:59


   Last Admin: 18 22:16 Dose:  3 ml


Amlodipine Besylate (Norvasc)  5 mg PO DAILY [facility]


   Stop: 19 08:59


   Last Admin: 12/01/18 08:15 Dose:  5 mg


Ascorbic Acid (Vitamin C)  500 mg PO DAILY [facility]


   Stop: 19 08:59


   Last Admin: 18 08:16 Dose:  500 mg


Atorvastatin Calcium (Lipitor)  10 mg PO HS [facility]; Protocol


   Stop: 19 20:59


   Last Admin: 18 21:01 Dose:  10 mg


Bisacodyl (Dulcolax 10 Mg Supp)  10 mg RC DAILY PRN


   PRN Reason: IF MOM INEFFECTIVE


   Stop: 19 17:58


Furosemide (Lasix)  40 mg IVP DAILY [facility]


   Stop: 19 08:59


   Last Admin: 18 08:15 Dose:  40 mg


Diltiazem HCl 125 mg/ Dextrose  100 mls @ 0 mls/hr IV Q8H PRN; Protocol


   PRN Reason: blood pressure


   Stop: 19 07:48


   Last Admin: 18 09:00 Dose:  6.25 mg/hr, 5 mls/hr


Piperacillin Sod/Tazobactam (Sod 2.25 gm/ Sodium Chloride)  50 mls @ 100 mls/hr 

IV Q6H [facility]


   Stop: 19 13:59


   Last Infusion: 18 20:03 Dose:  Infused


Dextrose/Sodium Chloride (D5-0.45ns)  1,000 mls @ 40 mls/hr IV .Q24H [facility]


   Stop: 19 00:00


   Last Admin: 18 17:37 Dose:  40 mls/hr


Vancomycin HCl 1.25 gm/ Sodium (Chloride)  250 mls @ 165 mls/hr IV Q36H [facility]


   Stop: 19 08:59


   Last Admin: 18 21:11 Dose:  Not Given


Insulin Aspart (Novolog Insulin Sliding Scale)  0 units SUBQ Q6HR [facility]; Protocol


   Stop: 19 01:14


   Last Admin: 18 17:49 Dose:  5 units


Lactobacillus Rhamnosus (Culturelle 15b)  1 each PO DAILY [facility]


   Stop: 19 08:59


   Last Admin: 18 08:16 Dose:  1 each


Lorazepam (Ativan)  1 mg IVP Q4HR PRN; Protocol


   PRN Reason: Agitation


   Stop: 19 07:12


   Last Admin: 18 00:04 Dose:  1 mg


Methylprednisolone Sodium Succinate (Solu-Medrol)  40 mg IVP Q6HR [facility]


   Stop: 19 17:59


   Last Admin: 18 17:36 Dose:  40 mg


Miscellaneous (Probiotic Screen)  1 [facility] PRN PRN


   PRN Reason: PROTOCOL


   Stop: 19 15:29


Miscellaneous (Vancomycin Iv Per Pharmacy)  1 [facility] PRN PATRICE


   Stop: 19 23:44


Miscellaneous (Zosyn Iv Per Pharmacy)  1 [facility] PRN PRN


   PRN Reason: PROTOCOL


   Stop: 19 12:03


Morphine Sulfate (Morphine)  1 mg IVP Q4HR PRN


   PRN Reason: Pain (Moderate)


   Stop: 19 10:04


   Last Admin: 18 05:55 Dose:  1 mg


Pantoprazole Sodium (Protonix)  40 mg IVP BID [facility]


   Stop: 19 08:59


   Last Admin: 18 17:36 Dose:  40 mg


Polyethylene Glycol (Miralax)  17 gm PO BID [facility]


   Stop: 19 08:59


   Last Admin: 18 17:36 Dose:  17 gm








General: no acute distress, well developed, well nourished


HEENT: atraumatic, normocephalic, PERRLA


Neck: supple, no thyromegaly, no lymphadenopathy


Cardiovascular: S1S2, regular


Lungs: clear to percussion, crackles


Abdomen: soft, no tender, no distended, no mass


Extremities: no cyanosis, no clubbing, no edema


Neurological: awake, alert


Skin: intact





- Procedures


Procedures: 


 Procedures











Procedure Code Date


 


EXCISION OF STOMACH, PYLORUS, ENDO, DIAGN [id number] 18


 


INSERTION OF FEEDING DEVICE INTO STOMACH, ENDO [id number] 18


 


RESPIRATORY VENTILATION, GREATER THAN 96 CONSECUTIVE HOURS [id number] 18














Infectious Disease Assmt/Plan





- Problem List


Patient Problems: 


All Active Problems





COFFEE GROUND EMESIS (Acute) 











- Assessment


Assessment: 





1.  Severe sepsis.


2.  Pneumonia.


3.  Fecal impaction.


4.  Gallbladder stone at bladder neck.


5.  Diabetes mellitus type 2.


6.  Hypertension.


7.  Dementia.


8.  Anemia.


9.  Large hiatal hernia.


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 


11. GI bleed 2/2 PUD.


12. SVT


13. VDRF.


14. S/p PEG placement, POD #1.


15. Hypokalemia.





- Plan


Plan: 





Continue zosyn.


Continue vancomycin IV





Will ask for LTAC eval.





Nutritional Asmnt/Malnutr-PDOC





- Dietary Evaluation


Malnutrition Findings (Please click <Entered> for more info): 








Nutritional Asmnt/Malnutrition                             Start:  18 15:

21


Text:                                                      Status: Complete    

  


Freq:                                                                          

  


Protocol:                                                                      

  


 Document     18 15:22  [place]  (Rec: 18 15:44  NICOLEFORREST  [email or web address])


 Nutritional Asmnt/Malnutrition


     Patient General Information


      Nutritional Screening                      High Risk


      Diagnosis                                  sepsis, dehydration, pneumonia


      Pertinent Medical Hx/Surgical Hx           HTN, DM, dyslipidemia,


                                                 dementia, anemia, muscle


                                                 weakness


      Subjective Information                     Pt receiving care from RN at


                                                 time of visit. Per nurse notes


                                                 , pt had coffee ground emesis,


                                                 NGT was unable to be inserted


                                                 d/t large retro cardiac


                                                 hiatal hernia, and will have


                                                 EGD tomorrow. Spoke w/ LEVON Lebron by phone who verfied


                                                 the nurse notes and states pt


                                                 will continue to be NPO.


      Current Diet Order/ Nutrition Support      NPO


      Pertinent Medications                      D5-0.45ns, novolog, culturelle


                                                 , pantoprazole


      Pertinent Labs                             : glucose 392, Na 144, Cl


                                                 110, BUN 78, Alb 2.9, 


                                                 -423


                                                 : glucose 547, Na 134, Cl


                                                 97, BUN 69, Alb 3.0, -


                                                 485


     Nutritional Hx/Data


      Height                                     1.63 m


      Height (Calculated Centimeters)            162.6


      Current Weight (lbs)                       64.682 kg


      Weight (Calculated Kilograms)              64.7


      Weight (Calculated Grams)                  81002.3


      Ideal Body Weight                          120 lb


      Body Mass Index (BMI)                      24.5


      Weight Status                              Approriate


     GI Symptoms


      GI Symptoms                                Nausea


                                                 Vomitting


      Last BM                                    none noted


      Difficult in:                              None


      Food Allergies                             No


      Skin Integrity/Comment:                    angelica bee 13


      Current %PO                                Negligible < 25%


     Estimated Nutritional Goals


      BEE in Kcals:                              Using Current wt


      Calories/Kcals/Kg                          30-35


      Kcals Calculated                           5396-8358


      Protein:                                   Using Current wt


      Protein g/k.2-1.5


      Protein Calculated                         78-97 g


      Fluid: ml                                  per MD


     Nutritional Problem


      2. Problem


       Problem                                   Altered nutrition related lab


                                                 values


       Etiology                                  dehydration, endocrine


                                                 dysfunction


       Signs/Symptoms:                           Cl 110, BUN 78, glucose 392,


                                                 -423


      1. Problem


       Problem                                   Increased nutrient needs


       Etiology                                  metabolic stress


       Signs/Symptoms:                           dx of sepsis and pneumonia


     Malnutrition Related to Morbid Obesity


      Malnutrition related to morbid obesity     No


     Intervention/Recommendation


      Comments                                   1. Monitor NPO status and


                                                 advance diet when medically


                                                 appropriate


                                                 2. Consider alternative


                                                 nutrition route if pt unable


                                                 to tolerate PO intake


                                                 3. Monitor wt, nutrition


                                                 related labs, and skin


                                                 integrity


                                                 4. F/U as high risk in 2-3


                                                 days, -


     Expected Outcomes/Goals


      Expected Outcomes/Goals                    1. Pt to start oral intake or


                                                 alternate nutrition route if


                                                 necessary


                                                 2. Wt stability, skin to


                                                 remain intact, nutrition


                                                 related labs to approach


                                                 normal limits


                                                 Reveiwed by Lisa Haas RD

## 2018-12-02 LAB
PCO2 BLDA: 48 MMHG (ref 35–45)
PO2 BLDA: 63 MMHG (ref 80–100)
SAO2 % BLDA: 94 % (ref 92–100)

## 2018-12-02 RX ADMIN — INSULIN ASPART SCH UNITS: 100 INJECTION, SOLUTION INTRAVENOUS; SUBCUTANEOUS at 00:32

## 2018-12-02 RX ADMIN — SODIUM CHLORIDE SCH MLS/HR: 9 INJECTION, SOLUTION INTRAVENOUS at 14:15

## 2018-12-02 RX ADMIN — INSULIN ASPART SCH UNITS: 100 INJECTION, SOLUTION INTRAVENOUS; SUBCUTANEOUS at 05:44

## 2018-12-02 RX ADMIN — POLYETHYLENE GLYCOL 3350 SCH: 17 POWDER, FOR SOLUTION ORAL at 16:21

## 2018-12-02 RX ADMIN — DEXTROSE AND SODIUM CHLORIDE SCH MLS/HR: 5; .45 INJECTION, SOLUTION INTRAVENOUS at 14:15

## 2018-12-02 RX ADMIN — METHYLPREDNISOLONE SODIUM SUCCINATE SCH MG: 40 INJECTION, POWDER, FOR SOLUTION INTRAMUSCULAR; INTRAVENOUS at 00:32

## 2018-12-02 RX ADMIN — SODIUM CHLORIDE SCH MLS/HR: 9 INJECTION, SOLUTION INTRAVENOUS at 08:07

## 2018-12-02 RX ADMIN — Medication SCH EACH: at 08:09

## 2018-12-02 RX ADMIN — METHYLPREDNISOLONE SODIUM SUCCINATE SCH MG: 40 INJECTION, POWDER, FOR SOLUTION INTRAMUSCULAR; INTRAVENOUS at 05:44

## 2018-12-02 RX ADMIN — INSULIN ASPART SCH UNITS: 100 INJECTION, SOLUTION INTRAVENOUS; SUBCUTANEOUS at 17:07

## 2018-12-02 RX ADMIN — IPRATROPIUM BROMIDE AND ALBUTEROL SULFATE SCH ML: .5; 3 SOLUTION RESPIRATORY (INHALATION) at 02:35

## 2018-12-02 RX ADMIN — SODIUM CHLORIDE SCH MLS/HR: 9 INJECTION, SOLUTION INTRAVENOUS at 20:05

## 2018-12-02 RX ADMIN — IPRATROPIUM BROMIDE AND ALBUTEROL SULFATE SCH ML: .5; 3 SOLUTION RESPIRATORY (INHALATION) at 06:58

## 2018-12-02 RX ADMIN — IPRATROPIUM BROMIDE AND ALBUTEROL SULFATE SCH ML: .5; 3 SOLUTION RESPIRATORY (INHALATION) at 23:06

## 2018-12-02 RX ADMIN — SODIUM CHLORIDE SCH MLS/HR: 9 INJECTION, SOLUTION INTRAVENOUS at 01:35

## 2018-12-02 RX ADMIN — IPRATROPIUM BROMIDE AND ALBUTEROL SULFATE SCH ML: .5; 3 SOLUTION RESPIRATORY (INHALATION) at 14:27

## 2018-12-02 RX ADMIN — IPRATROPIUM BROMIDE AND ALBUTEROL SULFATE SCH ML: .5; 3 SOLUTION RESPIRATORY (INHALATION) at 10:40

## 2018-12-02 RX ADMIN — METHYLPREDNISOLONE SODIUM SUCCINATE SCH MG: 40 INJECTION, POWDER, FOR SOLUTION INTRAMUSCULAR; INTRAVENOUS at 12:00

## 2018-12-02 RX ADMIN — IPRATROPIUM BROMIDE AND ALBUTEROL SULFATE SCH ML: .5; 3 SOLUTION RESPIRATORY (INHALATION) at 18:52

## 2018-12-02 RX ADMIN — POLYETHYLENE GLYCOL 3350 SCH GM: 17 POWDER, FOR SOLUTION ORAL at 08:08

## 2018-12-02 RX ADMIN — METHYLPREDNISOLONE SODIUM SUCCINATE SCH MG: 40 INJECTION, POWDER, FOR SOLUTION INTRAMUSCULAR; INTRAVENOUS at 17:09

## 2018-12-02 RX ADMIN — MORPHINE SULFATE PRN MG: 2 INJECTION, SOLUTION INTRAMUSCULAR; INTRAVENOUS at 22:30

## 2018-12-02 RX ADMIN — INSULIN ASPART SCH UNITS: 100 INJECTION, SOLUTION INTRAVENOUS; SUBCUTANEOUS at 12:00

## 2018-12-02 NOTE — DIAGNOSTIC IMAGING REPORT
CHEST X-RAY: AP view



INDICATION: Pneumonia



COMPARISON: 11/29/2018



FINDINGS: Findings of CHF are seen with small bilateral effusions and

bilateral infiltrates.  Cardiomegaly is noted with atherosclerosis.



IMPRESSION:



CHF with bilateral small effusions and bilateral infiltrates



Cardiomegaly and atherosclerotic vascular disease.

## 2018-12-02 NOTE — DIAGNOSTIC IMAGING REPORT
CHEST X-RAY: AP view



INDICATION: Pneumonia



COMPARISON: 12/1/2018



FINDINGS: Mild congestive changes are noted with right basal density. 

Cardiomegaly is noted with atherosclerosis.



IMPRESSION:



Mild congestive changes and right basal density likely due to a pleural

effusion.  There may be a consolidative changes of the right lung base. 

Left basal infiltrates are also suspected



Cardiomegaly and atherosclerotic vascular disease.

## 2018-12-03 LAB
ALBUMIN SERPL-MCNC: 3 GM/DL (ref 3.7–5.3)
ALBUMIN/GLOB SERPL: 0.8 {RATIO} (ref 1–1.8)
ALP SERPL-CCNC: 65 U/L (ref 34–104)
ALT SERPL-CCNC: 13 U/L (ref 7–52)
ANION GAP SERPL CALC-SCNC: 19.5 MMOL/L (ref 7–16)
AST SERPL-CCNC: 14 U/L (ref 13–39)
BILIRUB SERPL-MCNC: 0.3 MG/DL (ref 0.3–1)
BUN SERPL-MCNC: 40 MG/DL (ref 7–25)
CALCIUM SERPL-MCNC: 9.5 MG/DL (ref 8.6–10.3)
CHLORIDE SERPL-SCNC: 99 MEQ/L (ref 98–107)
CO2 SERPL-SCNC: 31.3 MEQ/L (ref 21–31)
CREAT SERPL-MCNC: 1.9 MG/DL (ref 0.6–1.2)
EOSINOPHIL NFR BLD: 1 % (ref 0–5)
ERYTHROCYTE [DISTWIDTH] IN BLOOD BY AUTOMATED COUNT: 13.6 % (ref 11.5–20)
GLOBULIN SER-MCNC: 3.8 GM/DL
GLUCOSE SERPL-MCNC: 366 MG/DL (ref 70–105)
HCT VFR BLD CALC: 28.2 % (ref 41–60)
HGB BLD-MCNC: 9 GM/DL (ref 12–16)
LYMPHOCYTES # BLD MANUAL: 7 % (ref 20–50)
MCH RBC QN AUTO: 27.6 PG (ref 27–31)
MCHC RBC AUTO-ENTMCNC: 31.9 PG (ref 28–36)
MCV RBC AUTO: 86.7 FL (ref 81–100)
MONOCYTES # BLD MANUAL: 6 % (ref 2–10)
NEUTROPHILS NFR BLD AUTO: 86 % (ref 40–80)
NEUTS BAND NFR BLD: 0 % (ref 0–10)
PCO2 BLDA: 60 MMHG (ref 35–45)
PLATELET # BLD: 525 TH/CMM (ref 150–400)
PMV BLD AUTO: 9.2 FL
PO2 BLDA: 68 MMHG (ref 80–100)
POTASSIUM SERPL-SCNC: 2.8 MEQ/L (ref 3.5–5.1)
RBC # BLD AUTO: 3.26 MIL/CMM (ref 3.8–5.2)
SAO2 % BLDA: 94 % (ref 92–100)
SODIUM SERPL-SCNC: 147 MEQ/L (ref 136–145)
WBC # BLD AUTO: 12.7 TH/CMM (ref 4.8–10.8)

## 2018-12-03 PROCEDURE — 5A09357 ASSISTANCE WITH RESPIRATORY VENTILATION, LESS THAN 24 CONSECUTIVE HOURS, CONTINUOUS POSITIVE AIRWAY PRESSURE: ICD-10-PCS

## 2018-12-03 RX ADMIN — METHYLPREDNISOLONE SODIUM SUCCINATE SCH MG: 40 INJECTION, POWDER, FOR SOLUTION INTRAMUSCULAR; INTRAVENOUS at 23:45

## 2018-12-03 RX ADMIN — MORPHINE SULFATE PRN MG: 2 INJECTION, SOLUTION INTRAMUSCULAR; INTRAVENOUS at 09:14

## 2018-12-03 RX ADMIN — METHYLPREDNISOLONE SODIUM SUCCINATE SCH MG: 40 INJECTION, POWDER, FOR SOLUTION INTRAMUSCULAR; INTRAVENOUS at 19:30

## 2018-12-03 RX ADMIN — IPRATROPIUM BROMIDE AND ALBUTEROL SULFATE SCH ML: .5; 3 SOLUTION RESPIRATORY (INHALATION) at 18:59

## 2018-12-03 RX ADMIN — IPRATROPIUM BROMIDE AND ALBUTEROL SULFATE SCH ML: .5; 3 SOLUTION RESPIRATORY (INHALATION) at 14:48

## 2018-12-03 RX ADMIN — IPRATROPIUM BROMIDE AND ALBUTEROL SULFATE SCH ML: .5; 3 SOLUTION RESPIRATORY (INHALATION) at 06:20

## 2018-12-03 RX ADMIN — IPRATROPIUM BROMIDE AND ALBUTEROL SULFATE SCH ML: .5; 3 SOLUTION RESPIRATORY (INHALATION) at 22:58

## 2018-12-03 RX ADMIN — IPRATROPIUM BROMIDE AND ALBUTEROL SULFATE SCH ML: .5; 3 SOLUTION RESPIRATORY (INHALATION) at 11:01

## 2018-12-03 RX ADMIN — IPRATROPIUM BROMIDE AND ALBUTEROL SULFATE SCH ML: .5; 3 SOLUTION RESPIRATORY (INHALATION) at 02:59

## 2018-12-03 RX ADMIN — SODIUM CHLORIDE SCH MLS/HR: 9 INJECTION, SOLUTION INTRAVENOUS at 15:34

## 2018-12-03 RX ADMIN — POLYETHYLENE GLYCOL 3350 SCH GM: 17 POWDER, FOR SOLUTION ORAL at 08:38

## 2018-12-03 RX ADMIN — INSULIN ASPART SCH UNITS: 100 INJECTION, SOLUTION INTRAVENOUS; SUBCUTANEOUS at 11:48

## 2018-12-03 RX ADMIN — METHYLPREDNISOLONE SODIUM SUCCINATE SCH MG: 40 INJECTION, POWDER, FOR SOLUTION INTRAMUSCULAR; INTRAVENOUS at 15:34

## 2018-12-03 RX ADMIN — METHYLPREDNISOLONE SODIUM SUCCINATE SCH MG: 40 INJECTION, POWDER, FOR SOLUTION INTRAMUSCULAR; INTRAVENOUS at 01:00

## 2018-12-03 RX ADMIN — INSULIN ASPART SCH UNITS: 100 INJECTION, SOLUTION INTRAVENOUS; SUBCUTANEOUS at 06:49

## 2018-12-03 RX ADMIN — SODIUM CHLORIDE SCH MLS/HR: 9 INJECTION, SOLUTION INTRAVENOUS at 20:54

## 2018-12-03 RX ADMIN — POLYETHYLENE GLYCOL 3350 SCH GM: 17 POWDER, FOR SOLUTION ORAL at 17:42

## 2018-12-03 RX ADMIN — SODIUM CHLORIDE SCH MLS/HR: 9 INJECTION, SOLUTION INTRAVENOUS at 01:00

## 2018-12-03 RX ADMIN — INSULIN ASPART SCH UNITS: 100 INJECTION, SOLUTION INTRAVENOUS; SUBCUTANEOUS at 02:27

## 2018-12-03 RX ADMIN — INSULIN ASPART SCH UNITS: 100 INJECTION, SOLUTION INTRAVENOUS; SUBCUTANEOUS at 17:38

## 2018-12-03 RX ADMIN — SODIUM CHLORIDE SCH MLS/HR: 9 INJECTION, SOLUTION INTRAVENOUS at 08:36

## 2018-12-03 RX ADMIN — Medication SCH EACH: at 08:38

## 2018-12-03 RX ADMIN — METHYLPREDNISOLONE SODIUM SUCCINATE SCH MG: 40 INJECTION, POWDER, FOR SOLUTION INTRAMUSCULAR; INTRAVENOUS at 06:49

## 2018-12-03 NOTE — PROGRESS NOTES
DATE:     12/02/2018



INFECTIOUS DISEASE AND INTERNAL MEDICINE NOTE



SUBJECTIVE:  The patient is lying in bed, not in acute distress or agitated. 

Restless.  On Ventimask.  No fever.  Remains short of breath.



OBJECTIVE:

VITAL SIGNS:  Current vital signs show temperature 97.8 degrees Fahrenheit,

pulse 86, respirations 16, blood pressure 124/49.

GENERAL:  The patient is comfortable lying in the bed, not in acute distress.

HEENT:  Head is normocephalic and atraumatic.  Oral cavity moist, pink tongue.

NECK:  Trachea in midline.

CHEST:  Bilaterally breath sounds.  Crackles present.

HEART:  S1, S2 within normal limits.  Regular rhythm.  No murmur, no gallop.

ABDOMEN:  Soft, nontender, nondistended.  Bowel sounds present.

EXTREMITIES:  No cyanosis, no clubbing, no edema.

NEUROLOGIC:  The patient is lethargic.



LABORATORY DATA:  WBC count 11,000, hemoglobin 8.6, platelets are 359,000. 

Sodium 145, potassium 4.1, chloride 103, bicarbonate is 35, creatinine 1.6 and

glucose is 313.



IMPRESSION:

1.  Sepsis, improving.

2.  Pneumonia with a new right-sided pneumonia and infiltrate.

3.  Reciprocal infection.

4.  Gallstone in the gallbladder neck.

5.  History of diabetes mellitus type 2.

6.  Hypertension.

7.  Dementia.

8.  Anemia.

9.  Large hiatal hernia.

10.  MRSA colonization.

11.  GI bleed secondary to peptic ulcer disease.

12.  ____.

13.  Respiratory insufficiency, on Ventimask.

14.  Status post PEG placement.

15.  Hypokalemia, replaced.



RECOMMENDATION:  Continue vancomycin IV and Zosyn.  Awaiting for the transfer to

LTAC.





DD: 12/03/2018 04:58

DT: 12/03/2018 05:39

JOB# [id number]  [id number]

[facility]

## 2018-12-03 NOTE — INFECTIOUS DISEASE PROG NOTE
Infectious Disease Subjective





- Review of Systems


Service Date: 18


Subjective: 


 


Patient is very lethargic and in SOB. No fever. 





Infectious Disease Objective





- Results


Result Diagrams: 


 18 04:45





 18 04:45


Recent Labs: 


 Laboratory Last Values











WBC  12.7 Th/cmm (4.8-10.8)  H  18  04:45    


 


RBC  3.26 Mil/cmm (3.80-5.20)  L  18  04:45    


 


Hgb  9.0 gm/dL (12-16)  L  18  04:45    


 


Hct  28.2 % (41.0-60)  L  18  04:45    


 


MCV  86.7 fl ()   18  04:45    


 


MCH  27.6 pg (27.0-31.0)   18  04:45    


 


MCHC Differential  31.9 pg (28.0-36.0)   18  04:45    


 


RDW  13.6 % (11.5-20.0)   18  04:45    


 


Plt Count  525 Th/cmm (150-400)  H  18  04:45    


 


MPV  9.2 fl  18  04:45    


 


Add Manual Diff  YES   18  04:45    


 


Neutrophils %  73.5 % (40.0-80.0)   18  07:39    


 


Band Neutrophils %  0 % (0-10)   18  04:45    


 


Lymphocytes %  13.5 % (20.0-50.0)  L  18  07:39    


 


Monocytes %  7.4 % (2.0-10.0)   18  07:39    


 


Eosinophils %  4.8 % (0.0-5.0)   18  07:39    


 


Basophils %  0.8 % (0.0-2.0)   18  07:39    


 


Neutrophils (Manual)  86 % (40-80)  H  18  04:45    


 


Lymphocytes  7 % (20-50)  L  18  04:45    


 


Monocytes  6 % (2-10)   18  04:45    


 


Eosinophils  1 % (0-5)   18  04:45    


 


Basophils  1 % (0-3)   18  04:45    


 


Platelet Estimate  ADEQUATE  (NORMAL)   18  04:45    


 


Anisocytosis  1+   18  06:10    


 


PT  10.6 SECONDS (9.5-11.5)   18  04:45    


 


INR  1.02  (0.5-1.4)   18  04:45    


 


PTT (Actin FS)  22.1 SECONDS (26.0-38.0)  L  18  19:40    


 


Specimen Source  ARTIAL   18  14:01    


 


Sample Site  Right Radial   18  14:01    


 


pH  7.44  (7.35-7.45)   18  14:01    


 


pCO2  60.0 mmHg (35.0-45.0)  H*  18  14:01    


 


pO2  68.0 mmHg (80.0-100.0)  L  18  14:01    


 


HCO3  35.8 mEq/L (20.0-26.0)  H  18  14:01    


 


Base Excess  14.1 mEq/L (-3.0-3.0)  H  18  14:01    


 


O2 Saturation  94.0 % (92.0-100.0)   18  14:01    


 


Boubacar Test  positive   18  14:01    


 


Vent Rate  n/a   18  14:01    


 


Inspired O2  32   18  14:01    


 


Tidal Volume  n/a   18  14:01    


 


PEEP  n/a   18  14:01    


 


Pressure (ins/psv/peep)  n/a   18  14:01    


 


Critical Value  CJ   18  14:01    


 


Sodium  147 mEq/L (136-145)  H  18  04:45    


 


Potassium  2.8 mEq/L (3.5-5.1)  L* D 18  04:45    


 


Chloride  99 mEq/L ()   18  04:45    


 


Carbon Dioxide  31.3 mEq/L (21.0-31.0)  H  18  04:45    


 


Anion Gap  19.5  (7.0-16.0)  H  18  04:45    


 


BUN  40 mg/dL (7-25)  H  18  04:45    


 


Creatinine  1.9 mg/dL (0.6-1.2)  H  18  04:45    


 


Est GFR ( Amer)  TNP   18  04:45    


 


Est GFR (Non-Af Amer)  TNP   18  04:45    


 


BUN/Creatinine Ratio  21.1   18  04:45    


 


Glucose  366 mg/dL ()  H D 18  04:45    


 


POC Glucose  197 MG/DL (70 - 105)  H  18  11:35    


 


Whole Bld Lactic Acid  2.46 mmol/L (0.60-1.99)  H*  18  10:07    


 


Calcium  9.5 mg/dL (8.6-10.3)   18  04:45    


 


Phosphorus  2.8 mg/dL (2.5-5.0)   18  04:35    


 


Magnesium  2.2 mg/dL (1.9-2.7)   18  04:45    


 


Total Bilirubin  0.3 mg/dL (0.3-1.0)   18  04:45    


 


AST  14 U/L (13-39)   18  04:45    


 


ALT  13 U/L (7-52)   18  04:45    


 


Alkaline Phosphatase  65 U/L ()   18  04:45    


 


B-Natriuretic Peptide  3040.0 pg/mL (5.0-100.0)  H  18  04:45    


 


Total Protein  6.8 gm/dL (6.0-8.3)   18  04:45    


 


Albumin  3.0 gm/dL (3.7-5.3)  L  18  04:45    


 


Globulin  3.8 gm/dL  18  04:45    


 


Albumin/Globulin Ratio  0.8  (1.0-1.8)  L  18  04:45    


 


Triglycerides  126 mg/dL (<150)   18  04:10    


 


Cholesterol  124 mg/dL (<200)   18  04:10    


 


LDL Cholesterol Direct  84 mg/dL ()   18  04:10    


 


HDL Cholesterol  30 mg/dL (23-92)   18  04:10    


 


TSH  0.75 uIU/ml (0.34-5.60)   18  04:10    


 


Vancomycin Trough  20.2 ug/mL (5-10)  H  18  08:55    


 


Helicobacter pylori Ab  NEGATIVE  (NEGATIVE)   18  08:35    


 


Blood Type  O POSITIVE   18  19:40    


 


Antibody Screen  NEGATIVE   18  19:40    


 


Crossmatch  See Detail   18  19:40    














- Physical Exam


Vitals and I&O: 


 Vital Signs











Temp  98 F   18 12:00


 


Pulse  72   18 12:00


 


Resp  14   18 12:00


 


BP  139/48   18 12:00


 


Pulse Ox  96   18 12:00








 Intake & Output











 18





 18:59 06:59 18:59


 


Intake Total 1950.666 920 50


 


Output Total 900 700 


 


Balance 1050.666 220 50


 


Weight (lbs) 69.899 kg 69.899 kg 


 


Intake:   


 


  Intake, IV Amount 1320.666 100 50


 


    D5-0.45NS 1,000 ml @ 40 970.666  





    mls/hr IV .Q24H [facility] Rx#:   





    [id number]   


 


    Piperacillin Sodium/ 100 100 50





    Tazobact 2.25 gm In   





    Sodium Chloride 0.9% 50   





    ml @ 100 mls/hr IV Q6H   





    [facility] Rx#:[id number]   


 


    Vancomycin HCl 0.75 gm In 250  





    Sodium Chloride 0.9% 250   





    ml @ 165 mls/hr IV Q24HR   





    [facility] Rx#:[id number]   


 


  Oral 0  


 


  Tube Feeding 250 600 


 


  Other 380 220 


 


Output:   


 


  Urine 900 700 


 


Other:   


 


  # Bowel Movements 3 2 


 


  Stool Characteristics Liquid Liquid Liquid





 Green Brown Brown





  Green Green


 


  Weight Source Bedscale Bedscale 











Active Medications: 


Current Medications





Acetaminophen (Tylenol)  650 mg PO Q4HR PRN


   PRN Reason: MILD PAIN 1-3


   Stop: 19 17:58


   Last Admin: 18 03:33 Dose:  650 mg


Acetaminophen (Tylenol)  650 mg PO Q4HR PRN


   PRN Reason: TEMP >100.4


   Stop: 19 17:58


Acetylcysteine (Mucomyst 20%)  3 ml HHN Q4HRT [facility]


   Stop: 19 14:59


   Last Admin: 18 11:01 Dose:  3 ml


Albuterol/Ipratropium (Duoneb Neb)  3 ml HHN Q4HRT [facility]


   Stop: 19 14:59


   Last Admin: 18 11:01 Dose:  3 ml


Amlodipine Besylate (Norvasc)  5 mg PO DAILY [facility]


   Stop: 19 08:59


   Last Admin: 18 08:38 Dose:  5 mg


Ascorbic Acid (Vitamin C)  500 mg PO DAILY [facility]


   Stop: 19 08:59


   Last Admin: 18 08:38 Dose:  500 mg


Atorvastatin Calcium (Lipitor)  10 mg PO HS [facility]; Protocol


   Stop: 19 20:59


   Last Admin: 18 20:06 Dose:  10 mg


Bisacodyl (Dulcolax 10 Mg Supp)  10 mg RC DAILY PRN


   PRN Reason: IF MOM INEFFECTIVE


   Stop: 19 17:58


Furosemide (Lasix)  40 mg IVP BID [facility]


   Stop: 19 16:59


   Last Admin: 18 08:37 Dose:  40 mg


Diltiazem HCl 125 mg/ Dextrose  100 mls @ 0 mls/hr IV Q8H PRN; Protocol


   PRN Reason: blood pressure


   Stop: 19 07:48


   Last Admin: 18 09:00 Dose:  6.25 mg/hr, 5 mls/hr


Piperacillin Sod/Tazobactam (Sod 2.25 gm/ Sodium Chloride)  50 mls @ 100 mls/hr 

IV Q6H [facility]


   Stop: 19 13:59


   Last Infusion: 18 09:06 Dose:  Infused


Vancomycin HCl 1.25 gm/ Sodium (Chloride)  250 mls @ 165 mls/hr IV Q48HR@0900 

[facility]


   Stop: 19 09:59


   Last Admin: 18 11:11 Dose:  165 mls/hr


Insulin Aspart (Novolog Insulin Sliding Scale)  0 units SUBQ Q6HR [facility]; Protocol


   Stop: 19 01:14


   Last Admin: 18 11:48 Dose:  3 units


Lactobacillus Rhamnosus (Culturelle 15b)  1 each PO DAILY [facility]


   Stop: 19 08:59


   Last Admin: 18 08:38 Dose:  1 each


Lorazepam (Ativan)  1 mg IVP Q4HR PRN; Protocol


   PRN Reason: Agitation


   Stop: 19 07:12


   Last Admin: 18 10:46 Dose:  1 mg


Methylprednisolone Sodium Succinate (Solu-Medrol)  40 mg IVP Q6HR [facility]


   Stop: 19 17:59


   Last Admin: 18 06:49 Dose:  40 mg


Miscellaneous (Probiotic Screen)  1 [facility] PRN PRN


   PRN Reason: PROTOCOL


   Stop: 19 15:29


Miscellaneous (Vancomycin Iv Per Pharmacy)  1 [facility] PRN PATRICE


   Stop: 19 23:44


Miscellaneous (Zosyn Iv Per Pharmacy)  1 [facility] PRN PRN


   PRN Reason: PROTOCOL


   Stop: 19 12:03


Morphine Sulfate (Morphine)  1 mg IVP Q4HR PRN


   PRN Reason: Pain (Moderate)


   Stop: 19 10:04


   Last Admin: 18 09:14 Dose:  1 mg


Pantoprazole Sodium (Protonix)  40 mg IVP BID [facility]


   Stop: 19 08:59


   Last Admin: 18 08:37 Dose:  40 mg


Polyethylene Glycol (Miralax)  17 gm PO BID [facility]


   Stop: 19 08:59


   Last Admin: 18 08:38 Dose:  17 gm








General: no acute distress, well developed, well nourished


HEENT: atraumatic, normocephalic, PERRLA, EOMI


Neck: supple, no thyromegaly


Cardiovascular: S1S2, regular


Lungs: clear to percussion, crackles, rhonchi


Abdomen: soft, no tender, no distended, no mass


Extremities: no cyanosis, no clubbing, no edema


Neurological: other (lethargic)


Skin: intact





- Procedures


Procedures: 


 Procedures











Procedure Code Date


 


EXCISION OF STOMACH, PYLORUS, ENDO, DIAGN 9SM09TL 18


 


INSERTION OF FEEDING DEVICE INTO STOMACH, ENDO [id number] 18


 


RESPIRATORY VENTILATION, GREATER THAN 96 CONSECUTIVE HOURS [id number] 18














Infectious Disease Assmt/Plan





- Problem List


Patient Problems: 


All Active Problems





COFFEE GROUND EMESIS (Acute) 











- Assessment


Assessment: 





1.  Severe sepsis.


2.  Pneumonia.


3.  Fecal impaction.


4.  Gallbladder stone at bladder neck.


5.  Diabetes mellitus type 2.


6.  Hypertension.


7.  Dementia.


8.  Anemia.


9.  Large hiatal hernia.


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 


11. GI bleed 2/2 PUD.


12. SVT


13. Respiratory insufficiency.


14. S/p PEG placement, POD #1.


15. Altered mental status, metabolic encephalopathy.





- Plan


Plan: 





Continue zosyn.


Continue vancomycin IV.


Order ABG stat. Further care per Dr Brown.





Patient is not ready for discharge at this time.





Nutritional Asmnt/Malnutr-PDOC





- Dietary Evaluation


Malnutrition Findings (Please click <Entered> for more info): 








Nutritional Asmnt/Malnutrition                             Start:  18 15:

21


Text:                                                      Status: Complete    

  


Freq:                                                                          

  


Protocol:                                                                      

  


 Document     18 15:22  DYFORREST  (Rec: 18 15:44  DYFORREST [id number])


 Nutritional Asmnt/Malnutrition


     Patient General Information


      Nutritional Screening                      High Risk


      Diagnosis                                  sepsis, dehydration, pneumonia


      Pertinent Medical Hx/Surgical Hx           HTN, DM, dyslipidemia,


                                                 dementia, anemia, muscle


                                                 weakness


      Subjective Information                     Pt receiving care from RN at


                                                 time of visit. Per nurse notes


                                                 , pt had coffee ground emesis,


                                                 NGT was unable to be inserted


                                                 d/t large retro cardiac


                                                 hiatal hernia, and will have


                                                 EGD tomorrow. Spoke w/ LEVON Lebron by phone who verfied


                                                 the nurse notes and states pt


                                                 will continue to be NPO.


      Current Diet Order/ Nutrition Support      NPO


      Pertinent Medications                      D5-0.45ns, novolog, culturelle


                                                 , pantoprazole


      Pertinent Labs                             : glucose 392, Na 144, Cl


                                                 110, BUN 78, Alb 2.9, 


                                                 -423


                                                 : glucose 547, Na 134, Cl


                                                 97, BUN 69, Alb 3.0, -


                                                 485


     Nutritional Hx/Data


      Height                                     1.63 m


      Height (Calculated Centimeters)            162.6


      Current Weight (lbs)                       64.682 kg


      Weight (Calculated Kilograms)              64.7


      Weight (Calculated Grams)                  62823.3


      Ideal Body Weight                          120 lb


      Body Mass Index (BMI)                      24.5


      Weight Status                              Approriate


     GI Symptoms


      GI Symptoms                                Nausea


                                                 Vomitting


      Last BM                                    none noted


      Difficult in:                              None


      Food Allergies                             No


      Skin Integrity/Comment:                    intact, angelica 13


      Current %PO                                Negligible < 25%


     Estimated Nutritional Goals


      BEE in Kcals:                              Using Current wt


      Calories/Kcals/Kg                          30-35


      Kcals Calculated                           5357-5440


      Protein:                                   Using Current wt


      Protein g/k.2-1.5


      Protein Calculated                         78-97 g


      Fluid: ml                                  per MD


     Nutritional Problem


      2. Problem


       Problem                                   Altered nutrition related lab


                                                 values


       Etiology                                  dehydration, endocrine


                                                 dysfunction


       Signs/Symptoms:                           Cl 110, BUN 78, glucose 392,


                                                 -423


      1. Problem


       Problem                                   Increased nutrient needs


       Etiology                                  metabolic stress


       Signs/Symptoms:                           dx of sepsis and pneumonia


     Malnutrition Related to Morbid Obesity


      Malnutrition related to morbid obesity     No


     Intervention/Recommendation


      Comments                                   1. Monitor NPO status and


                                                 advance diet when medically


                                                 appropriate


                                                 2. Consider alternative


                                                 nutrition route if pt unable


                                                 to tolerate PO intake


                                                 3. Monitor wt, nutrition


                                                 related labs, and skin


                                                 integrity


                                                 4. F/U as high risk in 2-3


                                                 days, -


     Expected Outcomes/Goals


      Expected Outcomes/Goals                    1. Pt to start oral intake or


                                                 alternate nutrition route if


                                                 necessary


                                                 2. Wt stability, skin to


                                                 remain intact, nutrition


                                                 related labs to approach


                                                 normal limits


                                                 Reveiwed by Lisa Haas RD

## 2018-12-03 NOTE — DIAGNOSTIC IMAGING REPORT
Exam: Portable chest x-ray



HISTORY: Shortness of breath.



COMPARISON: 12/02/2018.



Findings:



Portable summation of chest reviewed the study demonstrates significant

rotation of patient.  There is evidence for cardiomegaly and congestion.

 There is evidence for left-sided pneumonia most likely effusion.  The

visualized right lung parenchyma is well aerated.  Bony thorax is

intact.



IMPRESSION:



Limited study due to severe rotation of patient due to positioning.



Cardiomegaly mild congestion



Left-sided pneumonia question of effusion.  Follow-up examination

recommended

## 2018-12-04 LAB
ALBUMIN SERPL-MCNC: 2.8 GM/DL (ref 3.7–5.3)
ALBUMIN/GLOB SERPL: 0.9 {RATIO} (ref 1–1.8)
ALP SERPL-CCNC: 63 U/L (ref 34–104)
ALT SERPL-CCNC: 11 U/L (ref 7–52)
ANION GAP SERPL CALC-SCNC: 16.3 MMOL/L (ref 7–16)
AST SERPL-CCNC: 11 U/L (ref 13–39)
BASOPHILS NFR BLD: 0 % (ref 0–3)
BILIRUB SERPL-MCNC: 0.3 MG/DL (ref 0.3–1)
BUN SERPL-MCNC: 50 MG/DL (ref 7–25)
CALCIUM SERPL-MCNC: 9 MG/DL (ref 8.6–10.3)
CHLORIDE SERPL-SCNC: 102 MEQ/L (ref 98–107)
CO2 SERPL-SCNC: 32 MEQ/L (ref 21–31)
CREAT SERPL-MCNC: 1.9 MG/DL (ref 0.6–1.2)
EOSINOPHIL NFR BLD: 0 % (ref 0–5)
ERYTHROCYTE [DISTWIDTH] IN BLOOD BY AUTOMATED COUNT: 13.3 % (ref 11.5–20)
GLOBULIN SER-MCNC: 3.2 GM/DL
GLUCOSE SERPL-MCNC: 469 MG/DL (ref 70–105)
HCT VFR BLD CALC: 26.1 % (ref 41–60)
HGB BLD-MCNC: 8.5 GM/DL (ref 12–16)
LYMPHOCYTES # BLD MANUAL: 9 % (ref 20–50)
MCH RBC QN AUTO: 28.5 PG (ref 27–31)
MCHC RBC AUTO-ENTMCNC: 32.7 PG (ref 28–36)
MCV RBC AUTO: 87.2 FL (ref 81–100)
MONOCYTES # BLD MANUAL: 4 % (ref 2–10)
NEUTROPHILS NFR BLD AUTO: 87 % (ref 40–80)
NEUTS BAND NFR BLD: 0 % (ref 0–10)
PLATELET # BLD: 445 TH/CMM (ref 150–400)
PMV BLD AUTO: 9.2 FL
POTASSIUM SERPL-SCNC: 3.3 MEQ/L (ref 3.5–5.1)
RBC # BLD AUTO: 2.99 MIL/CMM (ref 3.8–5.2)
SODIUM SERPL-SCNC: 147 MEQ/L (ref 136–145)
WBC # BLD AUTO: 10 TH/CMM (ref 4.8–10.8)

## 2018-12-04 PROCEDURE — 5A09357 ASSISTANCE WITH RESPIRATORY VENTILATION, LESS THAN 24 CONSECUTIVE HOURS, CONTINUOUS POSITIVE AIRWAY PRESSURE: ICD-10-PCS

## 2018-12-04 RX ADMIN — IPRATROPIUM BROMIDE AND ALBUTEROL SULFATE SCH ML: .5; 3 SOLUTION RESPIRATORY (INHALATION) at 11:21

## 2018-12-04 RX ADMIN — IPRATROPIUM BROMIDE AND ALBUTEROL SULFATE SCH ML: .5; 3 SOLUTION RESPIRATORY (INHALATION) at 07:44

## 2018-12-04 RX ADMIN — INSULIN ASPART SCH UNITS: 100 INJECTION, SOLUTION INTRAVENOUS; SUBCUTANEOUS at 12:06

## 2018-12-04 RX ADMIN — MORPHINE SULFATE PRN MG: 2 INJECTION, SOLUTION INTRAMUSCULAR; INTRAVENOUS at 14:44

## 2018-12-04 RX ADMIN — IPRATROPIUM BROMIDE AND ALBUTEROL SULFATE SCH ML: .5; 3 SOLUTION RESPIRATORY (INHALATION) at 02:00

## 2018-12-04 RX ADMIN — SODIUM CHLORIDE SCH MLS/HR: 9 INJECTION, SOLUTION INTRAVENOUS at 02:43

## 2018-12-04 RX ADMIN — INSULIN ASPART SCH UNITS: 100 INJECTION, SOLUTION INTRAVENOUS; SUBCUTANEOUS at 01:35

## 2018-12-04 RX ADMIN — METHYLPREDNISOLONE SODIUM SUCCINATE SCH MG: 40 INJECTION, POWDER, FOR SOLUTION INTRAMUSCULAR; INTRAVENOUS at 05:32

## 2018-12-04 RX ADMIN — METHYLPREDNISOLONE SODIUM SUCCINATE SCH MG: 40 INJECTION, POWDER, FOR SOLUTION INTRAMUSCULAR; INTRAVENOUS at 17:25

## 2018-12-04 RX ADMIN — IPRATROPIUM BROMIDE AND ALBUTEROL SULFATE SCH ML: .5; 3 SOLUTION RESPIRATORY (INHALATION) at 16:00

## 2018-12-04 RX ADMIN — IPRATROPIUM BROMIDE AND ALBUTEROL SULFATE SCH ML: .5; 3 SOLUTION RESPIRATORY (INHALATION) at 22:13

## 2018-12-04 RX ADMIN — SODIUM CHLORIDE SCH MLS/HR: 9 INJECTION, SOLUTION INTRAVENOUS at 09:18

## 2018-12-04 RX ADMIN — IPRATROPIUM BROMIDE AND ALBUTEROL SULFATE SCH ML: .5; 3 SOLUTION RESPIRATORY (INHALATION) at 19:10

## 2018-12-04 RX ADMIN — SODIUM CHLORIDE SCH MLS/HR: 9 INJECTION, SOLUTION INTRAVENOUS at 20:32

## 2018-12-04 RX ADMIN — INSULIN ASPART SCH UNITS: 100 INJECTION, SOLUTION INTRAVENOUS; SUBCUTANEOUS at 17:26

## 2018-12-04 RX ADMIN — POLYETHYLENE GLYCOL 3350 SCH GM: 17 POWDER, FOR SOLUTION ORAL at 08:58

## 2018-12-04 RX ADMIN — INSULIN ASPART SCH UNITS: 100 INJECTION, SOLUTION INTRAVENOUS; SUBCUTANEOUS at 06:35

## 2018-12-04 RX ADMIN — SODIUM CHLORIDE SCH MLS/HR: 9 INJECTION, SOLUTION INTRAVENOUS at 14:43

## 2018-12-04 RX ADMIN — METHYLPREDNISOLONE SODIUM SUCCINATE SCH MG: 40 INJECTION, POWDER, FOR SOLUTION INTRAMUSCULAR; INTRAVENOUS at 12:03

## 2018-12-04 RX ADMIN — Medication SCH EACH: at 08:58

## 2018-12-04 RX ADMIN — POLYETHYLENE GLYCOL 3350 SCH: 17 POWDER, FOR SOLUTION ORAL at 16:57

## 2018-12-04 NOTE — INFECTIOUS DISEASE PROG NOTE
Infectious Disease Subjective





- Review of Systems


Service Date: 18


Subjective: 


 


Patient is very lethargic and in SOB. No fever. 





Infectious Disease Objective





- Results


Result Diagrams: 


 18 04:30





 18 04:30


Recent Labs: 


 Laboratory Last Values











WBC  10.0 Th/cmm (4.8-10.8)  D 18  04:30    


 


RBC  2.99 Mil/cmm (3.80-5.20)  L  18  04:30    


 


Hgb  8.5 gm/dL (12-16)  L  18  04:30    


 


Hct  26.1 % (41.0-60)  L  18  04:30    


 


MCV  87.2 fl ()   18  04:30    


 


MCH  28.5 pg (27.0-31.0)   18  04:30    


 


MCHC Differential  32.7 pg (28.0-36.0)   18  04:30    


 


RDW  13.3 % (11.5-20.0)   18  04:30    


 


Plt Count  445 Th/cmm (150-400)  H  18  04:30    


 


MPV  9.2 fl  18  04:30    


 


Add Manual Diff  YES   18  04:30    


 


Neutrophils %  73.5 % (40.0-80.0)   18  07:39    


 


Band Neutrophils %  0 % (0-10)   18  04:30    


 


Lymphocytes %  13.5 % (20.0-50.0)  L  18  07:39    


 


Monocytes %  7.4 % (2.0-10.0)   18  07:39    


 


Eosinophils %  4.8 % (0.0-5.0)   18  07:39    


 


Basophils %  0.8 % (0.0-2.0)   18  07:39    


 


Neutrophils (Manual)  87 % (40-80)  H  18  04:30    


 


Lymphocytes  9 % (20-50)  L  18  04:30    


 


Monocytes  4 % (2-10)   18  04:30    


 


Eosinophils  0 % (0-5)   18  04:30    


 


Basophils  0 % (0-3)   18  04:30    


 


Platelet Estimate  ADEQUATE  (NORMAL)   18  04:45    


 


Anisocytosis  1+   18  06:10    


 


PT  10.6 SECONDS (9.5-11.5)   18  04:45    


 


INR  1.02  (0.5-1.4)   18  04:45    


 


PTT (Actin FS)  22.1 SECONDS (26.0-38.0)  L  18  19:40    


 


Specimen Source  ARTIAL   18  14:01    


 


Sample Site  Right Radial   18  14:01    


 


pH  7.44  (7.35-7.45)   18  14:01    


 


pCO2  60.0 mmHg (35.0-45.0)  H*  18  14:01    


 


pO2  68.0 mmHg (80.0-100.0)  L  18  14:01    


 


HCO3  35.8 mEq/L (20.0-26.0)  H  18  14:01    


 


Base Excess  14.1 mEq/L (-3.0-3.0)  H  18  14:01    


 


O2 Saturation  94.0 % (92.0-100.0)   18  14:01    


 


Boubacar Test  positive   18  14:01    


 


Vent Rate  n/a   18  14:01    


 


Inspired O2  32   18  14:01    


 


Tidal Volume  n/a   18  14:01    


 


PEEP  n/a   18  14:01    


 


Pressure (ins/psv/peep)  n/a   18  14:01    


 


Critical Value  CJ   18  14:01    


 


Sodium  147 mEq/L (136-145)  H  18  04:30    


 


Potassium  3.3 mEq/L (3.5-5.1)  L  18  04:30    


 


Chloride  102 mEq/L ()   18  04:30    


 


Carbon Dioxide  32.0 mEq/L (21.0-31.0)  H  18  04:30    


 


Anion Gap  16.3  (7.0-16.0)  H  18  04:30    


 


BUN  50 mg/dL (7-25)  H  18  04:30    


 


Creatinine  1.9 mg/dL (0.6-1.2)  H  18  04:30    


 


Est GFR ( Amer)  TNP   18  04:30    


 


Est GFR (Non-Af Amer)  TNP   18  04:30    


 


BUN/Creatinine Ratio  26.3   18  04:30    


 


Glucose  469 mg/dL ()  H*  18  04:30    


 


POC Glucose  373 MG/DL (70 - 105)  H  18  11:59    


 


Whole Bld Lactic Acid  2.46 mmol/L (0.60-1.99)  H*  18  10:07    


 


Calcium  9.0 mg/dL (8.6-10.3)   18  04:30    


 


Phosphorus  2.8 mg/dL (2.5-5.0)   18  04:35    


 


Magnesium  2.2 mg/dL (1.9-2.7)   18  04:45    


 


Total Bilirubin  0.3 mg/dL (0.3-1.0)   18  04:30    


 


AST  11 U/L (13-39)  L  18  04:30    


 


ALT  11 U/L (7-52)   18  04:30    


 


Alkaline Phosphatase  63 U/L ()   18  04:30    


 


B-Natriuretic Peptide  3040.0 pg/mL (5.0-100.0)  H  18  04:45    


 


Total Protein  6.0 gm/dL (6.0-8.3)   18  04:30    


 


Albumin  2.8 gm/dL (3.7-5.3)  L  18  04:30    


 


Globulin  3.2 gm/dL  18  04:30    


 


Albumin/Globulin Ratio  0.9  (1.0-1.8)  L  18  04:30    


 


Triglycerides  126 mg/dL (<150)   18  04:10    


 


Cholesterol  124 mg/dL (<200)   18  04:10    


 


LDL Cholesterol Direct  84 mg/dL ()   18  04:10    


 


HDL Cholesterol  30 mg/dL (23-92)   18  04:10    


 


TSH  0.75 uIU/ml (0.34-5.60)   18  04:10    


 


Vancomycin Trough  20.2 ug/mL (5-10)  H  18  08:55    


 


Helicobacter pylori Ab  NEGATIVE  (NEGATIVE)   18  08:35    


 


Blood Type  O POSITIVE   18  19:40    


 


Antibody Screen  NEGATIVE   18  19:40    


 


Crossmatch  See Detail   18  19:40    














- Physical Exam


Vitals and I&O: 


 Vital Signs











Temp  97.7 F   18 11:39


 


Pulse  98   18 12:23


 


Resp  18   18 11:39


 


BP  150/80   18 12:23


 


Pulse Ox  96   18 11:39








 Intake & Output











 18





 18:59 06:59 18:59


 


Intake Total 100 700 


 


Output Total  600 


 


Balance 100 100 


 


Weight (lbs)  72.575 kg 


 


Intake:   


 


  Intake, IV Amount 100 100 


 


    Piperacillin Sodium/ 100 100 





    Tazobact 2.25 gm In   





    Sodium Chloride 0.9% 50   





    ml @ 100 mls/hr IV Q6H   





    On license of [facility] Rx#:[id number]   


 


  Tube Feeding  600 


 


Output:   


 


  Urine  600 


 


Other:   


 


  # Bowel Movements  1 


 


  Stool Characteristics Soft Soft Soft


 


  Weight Source  Bedscale 











Active Medications: 


Current Medications





Acetaminophen (Tylenol)  650 mg PO Q4HR PRN


   PRN Reason: MILD PAIN 1-3


   Stop: 19 17:58


   Last Admin: 18 23:46 Dose:  650 mg


Acetaminophen (Tylenol)  650 mg PO Q4HR PRN


   PRN Reason: TEMP >100.4


   Stop: 19 17:58


Acetylcysteine (Mucomyst 20%)  3 ml HHN Q4HRT On license of [facility]


   Stop: 19 14:59


   Last Admin: 18 11:21 Dose:  3 ml


Albuterol/Ipratropium (Duoneb Neb)  3 ml HHN Q4HRT On license of [facility]


   Stop: 19 14:59


   Last Admin: 18 11:21 Dose:  3 ml


Amlodipine Besylate (Norvasc)  5 mg PO DAILY On license of [facility]


   Stop: 19 08:59


   Last Admin: 18 09:04 Dose:  5 mg


Ascorbic Acid (Vitamin C)  500 mg PO DAILY On license of [facility]


   Stop: 19 08:59


   Last Admin: 18 08:58 Dose:  500 mg


Atorvastatin Calcium (Lipitor)  10 mg PO HS On license of [facility]; Protocol


   Stop: 19 20:59


   Last Admin: 18 20:55 Dose:  10 mg


Bisacodyl (Dulcolax 10 Mg Supp)  10 mg RC DAILY PRN


   PRN Reason: IF MOM INEFFECTIVE


   Stop: 19 17:58


Furosemide (Lasix)  40 mg IVP BID On license of [facility]


   Stop: 19 16:59


   Last Admin: 18 09:05 Dose:  40 mg


Diltiazem HCl 125 mg/ Dextrose  100 mls @ 0 mls/hr IV Q8H PRN; Protocol


   PRN Reason: blood pressure


   Stop: 19 07:48


   Last Admin: 18 09:00 Dose:  6.25 mg/hr, 5 mls/hr


Piperacillin Sod/Tazobactam (Sod 2.25 gm/ Sodium Chloride)  50 mls @ 100 mls/hr 

IV Q6H On license of [facility]


   Stop: 19 13:59


   Last Admin: 18 09:18 Dose:  100 mls/hr


Vancomycin HCl 1.25 gm/ Sodium (Chloride)  250 mls @ 165 mls/hr IV Q48HR@0900 

On license of [facility]


   Stop: 19 09:59


   Last Admin: 18 11:11 Dose:  165 mls/hr


Insulin Aspart (Novolog Insulin Sliding Scale)  0 units SUBQ Q6HR On license of [facility]; Protocol


   Stop: 19 01:14


   Last Admin: 18 12:06 Dose:  11 units


Lactobacillus Rhamnosus (Culturelle 15b)  1 each PO DAILY On license of [facility]


   Stop: 19 08:59


   Last Admin: 18 08:58 Dose:  1 each


Lorazepam (Ativan)  1 mg IVP Q4HR PRN; Protocol


   PRN Reason: Agitation


   Stop: 19 07:12


   Last Admin: 18 23:45 Dose:  1 mg


Methylprednisolone Sodium Succinate (Solu-Medrol)  40 mg IVP Q6HR On license of [facility]


   Stop: 19 17:59


   Last Admin: 18 12:03 Dose:  40 mg


Miscellaneous (Probiotic Screen)  1 ea MC PRN PRN


   PRN Reason: PROTOCOL


   Stop: 19 15:29


Miscellaneous (Vancomycin Iv Per Pharmacy)  1 ea MC PRN On license of [facility]


   Stop: 19 23:44


Miscellaneous (Zosyn Iv Per Pharmacy)  1 ea  PRN PRN


   PRN Reason: PROTOCOL


   Stop: 19 12:03


Morphine Sulfate (Morphine)  1 mg IVP Q4HR PRN


   PRN Reason: Pain (Moderate)


   Stop: 19 10:04


   Last Admin: 18 09:14 Dose:  1 mg


Pantoprazole Sodium (Protonix)  40 mg IVP BID On license of [facility]


   Stop: 19 08:59


   Last Admin: 18 08:58 Dose:  40 mg


Polyethylene Glycol (Miralax)  17 gm PO BID On license of [facility]


   Stop: 19 08:59


   Last Admin: 18 08:58 Dose:  17 gm








General: no acute distress, well developed, well nourished


HEENT: atraumatic, normocephalic, PERRLA, EOMI


Neck: supple, no thyromegaly


Cardiovascular: S1S2, regular


Lungs: clear to percussion, crackles, rhonchi


Abdomen: soft, no tender, no distended, no mass, no hepatomegaly


Extremities: no cyanosis, no clubbing, no edema


Neurological: awake, other (lethargic)





- Procedures


Procedures: 


 Procedures











Procedure Code Date


 


EXCISION OF STOMACH, PYLORUS, ENDO, DIAGN [id number] 18


 


INSERTION OF FEEDING DEVICE INTO STOMACH, ENDO [id number] 18


 


RESPIRATORY VENTILATION, GREATER THAN 96 CONSECUTIVE HOURS [id number] 18














Infectious Disease Assmt/Plan





- Problem List


Patient Problems: 


All Active Problems





COFFEE GROUND EMESIS (Acute) 











- Assessment


Assessment: 





1.  Severe sepsis. improved


2.  Pneumonia. the RLL infiltrate improved.


3.  Fecal impaction.


4.  Gallbladder stone at bladder neck.


5.  Diabetes mellitus type 2.


6.  Hypertension.


7.  Dementia.


8.  Anemia.


9.  Large hiatal hernia.


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 


11. GI bleed 2/2 PUD.


12. SVT


13. Respiratory insufficiency.


14. S/p PEG placement, 


15. Altered mental status, metabolic encephalopathy.





- Plan


Plan: 





Continue zosyn.


Continue vancomycin IV.


May transfer to the contracted facility.





 





Nutritional Asmnt/Malnutr-PDOC





- Dietary Evaluation


Malnutrition Findings (Please click <Entered> for more info): 








Nutritional Asmnt/Malnutrition                             Start:  18 15:

21


Text:                                                      Status: Complete    

  


Freq:                                                                          

  


Protocol:                                                                      

  


 Document     18 15:22  [facility]  (Rec: 18 15:44  [facility] [id number])


 Nutritional Asmnt/Malnutrition


     Patient General Information


      Nutritional Screening                      High Risk


      Diagnosis                                  sepsis, dehydration, pneumonia


      Pertinent Medical Hx/Surgical Hx           HTN, DM, dyslipidemia,


                                                 dementia, anemia, muscle


                                                 weakness


      Subjective Information                     Pt receiving care from RN at


                                                 time of visit. Per nurse notes


                                                 , pt had coffee ground emesis,


                                                 NGT was unable to be inserted


                                                 d/t large retro cardiac


                                                 hiatal hernia, and will have


                                                 EGD tomorrow. Spoke w/ LEVON Lebron by phone who verfied


                                                 the nurse notes and states pt


                                                 will continue to be NPO.


      Current Diet Order/ Nutrition Support      NPO


      Pertinent Medications                      D5-0.45ns, novolog, culturelle


                                                 , pantoprazole


      Pertinent Labs                             : glucose 392, Na 144, Cl


                                                 110, BUN 78, Alb 2.9, 


                                                 -423


                                                 : glucose 547, Na 134, Cl


                                                 97, BUN 69, Alb 3.0, -


                                                 485


     Nutritional Hx/Data


      Height                                     1.63 m


      Height (Calculated Centimeters)            162.6


      Current Weight (lbs)                       64.682 kg


      Weight (Calculated Kilograms)              64.7


      Weight (Calculated Grams)                  59457.3


      Ideal Body Weight                          120 lb


      Body Mass Index (BMI)                      24.5


      Weight Status                              Approriate


     GI Symptoms


      GI Symptoms                                Nausea


                                                 Vomitting


      Last BM                                    none noted


      Difficult in:                              None


      Food Allergies                             No


      Skin Integrity/Comment:                    angleica bee 13


      Current %PO                                Negligible < 25%


     Estimated Nutritional Goals


      BEE in Kcals:                              Using Current wt


      Calories/Kcals/Kg                          30-35


      Kcals Calculated                           9872-6307


      Protein:                                   Using Current wt


      Protein g/k.2-1.5


      Protein Calculated                         78-97 g


      Fluid: ml                                  per MD


     Nutritional Problem


      2. Problem


       Problem                                   Altered nutrition related lab


                                                 values


       Etiology                                  dehydration, endocrine


                                                 dysfunction


       Signs/Symptoms:                           Cl 110, BUN 78, glucose 392,


                                                 -423


      1. Problem


       Problem                                   Increased nutrient needs


       Etiology                                  metabolic stress


       Signs/Symptoms:                           dx of sepsis and pneumonia


     Malnutrition Related to Morbid Obesity


      Malnutrition related to morbid obesity     No


     Intervention/Recommendation


      Comments                                   1. Monitor NPO status and


                                                 advance diet when medically


                                                 appropriate


                                                 2. Consider alternative


                                                 nutrition route if pt unable


                                                 to tolerate PO intake


                                                 3. Monitor wt, nutrition


                                                 related labs, and skin


                                                 integrity


                                                 4. F/U as high risk in 2-3


                                                 days, -


     Expected Outcomes/Goals


      Expected Outcomes/Goals                    1. Pt to start oral intake or


                                                 alternate nutrition route if


                                                 necessary


                                                 2. Wt stability, skin to


                                                 remain intact, nutrition


                                                 related labs to approach


                                                 normal limits


                                                 Reveiwed by Lisa Haas RD

## 2018-12-05 LAB
ALBUMIN SERPL-MCNC: 3 GM/DL (ref 3.7–5.3)
ALBUMIN/GLOB SERPL: 0.9 {RATIO} (ref 1–1.8)
ALP SERPL-CCNC: 71 U/L (ref 34–104)
ALT SERPL-CCNC: 11 U/L (ref 7–52)
ANION GAP SERPL CALC-SCNC: 14.2 MMOL/L (ref 7–16)
AST SERPL-CCNC: 8 U/L (ref 13–39)
BASOPHILS NFR BLD: 0 % (ref 0–3)
BILIRUB SERPL-MCNC: 0.3 MG/DL (ref 0.3–1)
BUN SERPL-MCNC: 63 MG/DL (ref 7–25)
CALCIUM SERPL-MCNC: 9.3 MG/DL (ref 8.6–10.3)
CHLORIDE SERPL-SCNC: 105 MEQ/L (ref 98–107)
CO2 SERPL-SCNC: 35.4 MEQ/L (ref 21–31)
CREAT SERPL-MCNC: 1.9 MG/DL (ref 0.6–1.2)
EOSINOPHIL NFR BLD: 0 % (ref 0–5)
ERYTHROCYTE [DISTWIDTH] IN BLOOD BY AUTOMATED COUNT: 13.4 % (ref 11.5–20)
GLOBULIN SER-MCNC: 3.2 GM/DL
GLUCOSE SERPL-MCNC: 308 MG/DL (ref 70–105)
HCT VFR BLD CALC: 28.7 % (ref 41–60)
HGB BLD-MCNC: 9.6 GM/DL (ref 12–16)
LYMPHOCYTES # BLD MANUAL: 5 % (ref 20–50)
MCH RBC QN AUTO: 29.1 PG (ref 27–31)
MCHC RBC AUTO-ENTMCNC: 33.4 PG (ref 28–36)
MCV RBC AUTO: 86.9 FL (ref 81–100)
MONOCYTES # BLD MANUAL: 5 % (ref 2–10)
NEUTROPHILS NFR BLD AUTO: 88 % (ref 40–80)
NEUTS BAND NFR BLD: 2 % (ref 0–10)
PLATELET # BLD: 487 TH/CMM (ref 150–400)
PMV BLD AUTO: 8.8 FL
POTASSIUM SERPL-SCNC: 3.6 MEQ/L (ref 3.5–5.1)
RBC # BLD AUTO: 3.31 MIL/CMM (ref 3.8–5.2)
SODIUM SERPL-SCNC: 151 MEQ/L (ref 136–145)
WBC # BLD AUTO: 14.9 TH/CMM (ref 4.8–10.8)

## 2018-12-05 PROCEDURE — 5A09357 ASSISTANCE WITH RESPIRATORY VENTILATION, LESS THAN 24 CONSECUTIVE HOURS, CONTINUOUS POSITIVE AIRWAY PRESSURE: ICD-10-PCS

## 2018-12-05 RX ADMIN — SODIUM CHLORIDE SCH MLS/HR: 9 INJECTION, SOLUTION INTRAVENOUS at 15:47

## 2018-12-05 RX ADMIN — IPRATROPIUM BROMIDE AND ALBUTEROL SULFATE SCH ML: .5; 3 SOLUTION RESPIRATORY (INHALATION) at 14:23

## 2018-12-05 RX ADMIN — IPRATROPIUM BROMIDE AND ALBUTEROL SULFATE SCH ML: .5; 3 SOLUTION RESPIRATORY (INHALATION) at 07:54

## 2018-12-05 RX ADMIN — INSULIN ASPART SCH UNITS: 100 INJECTION, SOLUTION INTRAVENOUS; SUBCUTANEOUS at 06:13

## 2018-12-05 RX ADMIN — INSULIN ASPART SCH UNITS: 100 INJECTION, SOLUTION INTRAVENOUS; SUBCUTANEOUS at 12:49

## 2018-12-05 RX ADMIN — Medication SCH EACH: at 09:02

## 2018-12-05 RX ADMIN — SODIUM CHLORIDE SCH MLS/HR: 9 INJECTION, SOLUTION INTRAVENOUS at 20:21

## 2018-12-05 RX ADMIN — INSULIN ASPART SCH UNITS: 100 INJECTION, SOLUTION INTRAVENOUS; SUBCUTANEOUS at 00:33

## 2018-12-05 RX ADMIN — IPRATROPIUM BROMIDE AND ALBUTEROL SULFATE SCH ML: .5; 3 SOLUTION RESPIRATORY (INHALATION) at 04:01

## 2018-12-05 RX ADMIN — IPRATROPIUM BROMIDE AND ALBUTEROL SULFATE SCH ML: .5; 3 SOLUTION RESPIRATORY (INHALATION) at 12:02

## 2018-12-05 RX ADMIN — METHYLPREDNISOLONE SODIUM SUCCINATE SCH MG: 40 INJECTION, POWDER, FOR SOLUTION INTRAMUSCULAR; INTRAVENOUS at 09:01

## 2018-12-05 RX ADMIN — POLYETHYLENE GLYCOL 3350 SCH GM: 17 POWDER, FOR SOLUTION ORAL at 17:31

## 2018-12-05 RX ADMIN — INSULIN ASPART SCH UNITS: 100 INJECTION, SOLUTION INTRAVENOUS; SUBCUTANEOUS at 17:53

## 2018-12-05 RX ADMIN — SODIUM CHLORIDE SCH MLS/HR: 9 INJECTION, SOLUTION INTRAVENOUS at 09:00

## 2018-12-05 RX ADMIN — SODIUM CHLORIDE SCH MLS/HR: 9 INJECTION, SOLUTION INTRAVENOUS at 02:36

## 2018-12-05 RX ADMIN — METHYLPREDNISOLONE SODIUM SUCCINATE SCH MG: 40 INJECTION, POWDER, FOR SOLUTION INTRAMUSCULAR; INTRAVENOUS at 22:30

## 2018-12-05 RX ADMIN — IPRATROPIUM BROMIDE AND ALBUTEROL SULFATE SCH ML: .5; 3 SOLUTION RESPIRATORY (INHALATION) at 19:51

## 2018-12-05 RX ADMIN — POLYETHYLENE GLYCOL 3350 SCH GM: 17 POWDER, FOR SOLUTION ORAL at 09:01

## 2018-12-05 NOTE — DIAGNOSTIC IMAGING REPORT
CHEST X-RAY: AP view



INDICATION: Shortness of breath



COMPARISON: 12/3/2018



FINDINGS: Worsening CHF are seen with developing right lung infiltrates

and right effusion.  Cardiomegaly is noted with atherosclerosis.



IMPRESSION:



Worsening CHF with developing right lung infiltrates and right effusion.



Cardiomegaly and atherosclerotic vascular disease.

## 2018-12-06 LAB
ALBUMIN SERPL-MCNC: 2.7 GM/DL (ref 3.7–5.3)
ALBUMIN/GLOB SERPL: 0.9 {RATIO} (ref 1–1.8)
ALP SERPL-CCNC: 89 U/L (ref 34–104)
ALT SERPL-CCNC: 9 U/L (ref 7–52)
ANION GAP SERPL CALC-SCNC: 11.9 MMOL/L (ref 7–16)
AST SERPL-CCNC: 7 U/L (ref 13–39)
BILIRUB SERPL-MCNC: 0.3 MG/DL (ref 0.3–1)
BUN SERPL-MCNC: 71 MG/DL (ref 7–25)
CALCIUM SERPL-MCNC: 9 MG/DL (ref 8.6–10.3)
CHLORIDE SERPL-SCNC: 106 MEQ/L (ref 98–107)
CO2 SERPL-SCNC: 40.1 MEQ/L (ref 21–31)
CREAT SERPL-MCNC: 1.9 MG/DL (ref 0.6–1.2)
ERYTHROCYTE [DISTWIDTH] IN BLOOD BY AUTOMATED COUNT: 13.8 % (ref 11.5–20)
GLOBULIN SER-MCNC: 2.9 GM/DL
GLUCOSE SERPL-MCNC: 307 MG/DL (ref 70–105)
HCT VFR BLD CALC: 29.7 % (ref 41–60)
HGB BLD-MCNC: 9.4 GM/DL (ref 12–16)
LYMPHOCYTES # BLD MANUAL: 2 % (ref 20–50)
MCH RBC QN AUTO: 27.6 PG (ref 27–31)
MCHC RBC AUTO-ENTMCNC: 31.5 PG (ref 28–36)
MCV RBC AUTO: 87.5 FL (ref 81–100)
MONOCYTES # BLD MANUAL: 2 % (ref 2–10)
NEUTROPHILS NFR BLD AUTO: 94 % (ref 40–80)
NEUTS BAND NFR BLD: 2 % (ref 0–10)
PCO2 BLDA: 52 MMHG (ref 35–45)
PLATELET # BLD EST: (no result) 10*3/UL
PLATELET # BLD: 470 TH/CMM (ref 150–400)
PMV BLD AUTO: 9.1 FL
PO2 BLDA: 76 MMHG (ref 80–100)
POTASSIUM SERPL-SCNC: 4 MEQ/L (ref 3.5–5.1)
RBC # BLD AUTO: 3.4 MIL/CMM (ref 3.8–5.2)
SAO2 % BLDA: 97 % (ref 92–100)
SODIUM SERPL-SCNC: 154 MEQ/L (ref 136–145)
WBC # BLD AUTO: 12.8 TH/CMM (ref 4.8–10.8)

## 2018-12-06 PROCEDURE — 5A09357 ASSISTANCE WITH RESPIRATORY VENTILATION, LESS THAN 24 CONSECUTIVE HOURS, CONTINUOUS POSITIVE AIRWAY PRESSURE: ICD-10-PCS

## 2018-12-06 RX ADMIN — IPRATROPIUM BROMIDE AND ALBUTEROL SULFATE SCH ML: .5; 3 SOLUTION RESPIRATORY (INHALATION) at 14:21

## 2018-12-06 RX ADMIN — POLYETHYLENE GLYCOL 3350 SCH: 17 POWDER, FOR SOLUTION ORAL at 17:06

## 2018-12-06 RX ADMIN — INSULIN ASPART SCH UNITS: 100 INJECTION, SOLUTION INTRAVENOUS; SUBCUTANEOUS at 11:53

## 2018-12-06 RX ADMIN — Medication SCH EACH: at 09:06

## 2018-12-06 RX ADMIN — SODIUM CHLORIDE SCH MLS/HR: 9 INJECTION, SOLUTION INTRAVENOUS at 01:53

## 2018-12-06 RX ADMIN — IPRATROPIUM BROMIDE AND ALBUTEROL SULFATE SCH ML: .5; 3 SOLUTION RESPIRATORY (INHALATION) at 19:34

## 2018-12-06 RX ADMIN — INSULIN ASPART SCH UNITS: 100 INJECTION, SOLUTION INTRAVENOUS; SUBCUTANEOUS at 17:18

## 2018-12-06 RX ADMIN — INSULIN ASPART SCH UNITS: 100 INJECTION, SOLUTION INTRAVENOUS; SUBCUTANEOUS at 00:39

## 2018-12-06 RX ADMIN — IPRATROPIUM BROMIDE AND ALBUTEROL SULFATE SCH ML: .5; 3 SOLUTION RESPIRATORY (INHALATION) at 04:23

## 2018-12-06 RX ADMIN — POLYETHYLENE GLYCOL 3350 SCH GM: 17 POWDER, FOR SOLUTION ORAL at 09:07

## 2018-12-06 RX ADMIN — METHYLPREDNISOLONE SODIUM SUCCINATE SCH MG: 40 INJECTION, POWDER, FOR SOLUTION INTRAMUSCULAR; INTRAVENOUS at 09:03

## 2018-12-06 RX ADMIN — IPRATROPIUM BROMIDE AND ALBUTEROL SULFATE SCH ML: .5; 3 SOLUTION RESPIRATORY (INHALATION) at 01:20

## 2018-12-06 RX ADMIN — IPRATROPIUM BROMIDE AND ALBUTEROL SULFATE SCH ML: .5; 3 SOLUTION RESPIRATORY (INHALATION) at 06:57

## 2018-12-06 RX ADMIN — INSULIN ASPART SCH UNITS: 100 INJECTION, SOLUTION INTRAVENOUS; SUBCUTANEOUS at 05:50

## 2018-12-06 RX ADMIN — IPRATROPIUM BROMIDE AND ALBUTEROL SULFATE SCH ML: .5; 3 SOLUTION RESPIRATORY (INHALATION) at 11:54

## 2018-12-06 NOTE — PROGRESS NOTES
DATE:  12/05/2018



INFECTIOUS DISEASE AND INTERNAL MEDICINE NOTE



SUBJECTIVE:  The patient lying in the bed, not in acute distress.  No fever, no

chills.  The patient was hypoxic and the patient's mentation altered and

deteriorated.  BiPAP was put in and she is still lethargic.



OBJECTIVE:

VITAL SIGNS:  Current vital signs shows temperature is 97.8, pulse 70,

respiration 26, blood pressure 155/69.

GENERAL:  The patient is comfortable, obese, not in acute distress, obtunded, on

BiPAP.

HEENT:  Head is normocephalic, atraumatic.  Oral cavity moist, pink.  Eyes: 

Pallor is present, no icterus.  PERRLA, EOMI.

NECK:  Supple.  No JVD.  No carotid bruit.  Trachea in midline.

CHEST:  Bilateral breath sounds.  No crackles present.

HEART:  S1, S2 within normal limits.  Regular rhythm.  No murmur, no gallop.

ABDOMEN:  Soft, nontender, nondistended.  Bowel sounds present.

EXTREMITIES:  No cyanosis, no clubbing, no edema.

NEUROLOGIC:  Obtunded.



LABORATORY DATA:  Shows WBC count is 40,900, hemoglobin 9.6, hematocrit 28.7,

platelets are 487,000, neutrophils 88%.  Sodium 151, potassium 3.6, chloride

105, bicarbonate is 35, BUN is 63, creatinine 1.9 and glucose is 308.  Chest

x-ray showed worsening CHF with a developing right lung infiltrate and right

effusion.  Cardiomegaly and atherosclerosis.



ASSESSMENT:

1.  Worsening of respiratory status, respiratory failure.

2.  Pneumonia.  The patient had developed a rash and a new pneumonia and

effusion.

3.  Altered mental status secondary to metabolic encephalopathy.

4.  Fecal impaction.

5.  Gallstones, gallbladder stone at neck.

6.  Diabetes mellitus type 2.

7.  Hypertension.

8.  Dementia.

9.  Anemia.

10.  ____ 

11.  Respiratory insufficiency.



RECOMMENDATION AND PLAN:  We will continue vancomycin and change Zosyn to

meropenem.  The patient's condition is critical.  He is not ready for discharge

at this time, I have spoken to  ____ Medical Director for ____ and have given

an update on the patient.





DD: 12/06/2018 01:52

DT: 12/06/2018 06:13

JOB# [id number]  [id number]

## 2018-12-06 NOTE — DIAGNOSTIC IMAGING REPORT
Portable chest x-ray



HISTORY: Shortness of breath



Compared with prior exam of December 5, 2018, the heart remains

enlarged.  Density is noted in the right lower hemithorax consistent

with a pleural effusion.  Underlying infiltrate/pneumonia cannot be

excluded.  Poor visualization of the left lower lobe.



IMPRESSION:

1.  Limited exam due to difficulty in patient positioning

2.  Cardiomegaly with atherosclerotic vascular changes

3.  Persistent density right lower hemithorax consistent with a pleural

effusion.  Underlying infiltrate including pneumonia cannot be excluded.

 Additional increased density noted in the left lower hemithorax. 

Again, consolidation and/or atelectasis cannot be ruled out.

## 2018-12-06 NOTE — INFECTIOUS DISEASE PROG NOTE
Infectious Disease Subjective





- Review of Systems


Service Date: 18


Subjective: 


 


Patient is very lethargic and in SOB. No fever.  On bipap.





Infectious Disease Objective





- Results


Result Diagrams: 


 18 05:20





 18 05:20


Recent Labs: 


 Laboratory Last Values











WBC  12.8 Th/cmm (4.8-10.8)  H  18  05:20    


 


RBC  3.40 Mil/cmm (3.80-5.20)  L  18  05:20    


 


Hgb  9.4 gm/dL (12-16)  L  18  05:20    


 


Hct  29.7 % (41.0-60)  L  18  05:20    


 


MCV  87.5 fl ()   18  05:20    


 


MCH  27.6 pg (27.0-31.0)   18  05:20    


 


MCHC Differential  31.5 pg (28.0-36.0)   18  05:20    


 


RDW  13.8 % (11.5-20.0)   18  05:20    


 


Plt Count  470 Th/cmm (150-400)  H  18  05:20    


 


MPV  9.1 fl  18  05:20    


 


Add Manual Diff  YES   18  05:20    


 


Neutrophils %  73.5 % (40.0-80.0)   18  07:39    


 


Band Neutrophils %  2 % (0-10)   18  05:20    


 


Lymphocytes %  13.5 % (20.0-50.0)  L  18  07:39    


 


Monocytes %  7.4 % (2.0-10.0)   18  07:39    


 


Eosinophils %  4.8 % (0.0-5.0)   18  07:39    


 


Basophils %  0.8 % (0.0-2.0)   18  07:39    


 


Neutrophils (Manual)  94 % (40-80)  H  18  05:20    


 


Lymphocytes  2 % (20-50)  L  18  05:20    


 


Monocytes  2 % (2-10)   18  05:20    


 


Eosinophils  0 % (0-5)   18  07:40    


 


Basophils  0 % (0-3)   18  07:40    


 


Platelet Estimate  INCREASED PLATELETS  (NORMAL)   18  05:20    


 


Anisocytosis  1+   18  06:10    


 


PT  10.6 SECONDS (9.5-11.5)   18  04:45    


 


INR  1.02  (0.5-1.4)   18  04:45    


 


PTT (Actin FS)  22.1 SECONDS (26.0-38.0)  L  18  19:40    


 


Specimen Source  Arterial   18  08:45    


 


Sample Site  RB   18  08:45    


 


pH  7.54  (7.35-7.45)  H  18  08:45    


 


pCO2  52.0 mmHg (35.0-45.0)  H  18  08:45    


 


pO2  76.0 mmHg (80.0-100.0)  L  18  08:45    


 


HCO3  39.9 mEq/L (20.0-26.0)  H  18  08:45    


 


Base Excess  19.2 mEq/L (-3.0-3.0)  H  18  08:45    


 


O2 Saturation  97.0 % (92.0-100.0)   18  08:45    


 


Boubacar Test  NA   18  08:45    


 


Vent Rate  NA   18  08:45    


 


Inspired O2  50   18  08:45    


 


Tidal Volume  NA   18  08:45    


 


PEEP  NA   18  08:45    


 


Pressure (ins/psv/peep)  NA   18  08:45    


 


Critical Value  E.MCDONNELL   18  08:45    


 


Sodium  154 mEq/L (136-145)  H  18  05:20    


 


Potassium  4.0 mEq/L (3.5-5.1)   18  05:20    


 


Chloride  106 mEq/L ()   18  05:20    


 


Carbon Dioxide  40.1 mEq/L (21.0-31.0)  H  18  05:20    


 


Anion Gap  11.9  (7.0-16.0)   18  05:20    


 


BUN  71 mg/dL (7-25)  H  18  05:20    


 


Creatinine  1.9 mg/dL (0.6-1.2)  H  18  05:20    


 


Est GFR ( Amer)  TNP   18  05:20    


 


Est GFR (Non-Af Amer)  TNP   18  05:20    


 


BUN/Creatinine Ratio  37.4   18  05:20    


 


Glucose  307 mg/dL ()  H  18  05:20    


 


POC Glucose  315 MG/DL (70 - 105)  H  18  11:48    


 


Whole Bld Lactic Acid  2.46 mmol/L (0.60-1.99)  H*  18  10:07    


 


Calcium  9.0 mg/dL (8.6-10.3)   18  05:20    


 


Phosphorus  2.8 mg/dL (2.5-5.0)   18  04:35    


 


Magnesium  2.2 mg/dL (1.9-2.7)   18  04:45    


 


Total Bilirubin  0.3 mg/dL (0.3-1.0)   18  05:20    


 


AST  7 U/L (13-39)  L  18  05:20    


 


ALT  9 U/L (7-52)   18  05:20    


 


Alkaline Phosphatase  89 U/L ()   18  05:20    


 


B-Natriuretic Peptide  3040.0 pg/mL (5.0-100.0)  H  18  04:45    


 


Total Protein  5.6 gm/dL (6.0-8.3)  L  18  05:20    


 


Albumin  2.7 gm/dL (3.7-5.3)  L  18  05:20    


 


Globulin  2.9 gm/dL  18  05:20    


 


Albumin/Globulin Ratio  0.9  (1.0-1.8)  L  18  05:20    


 


Triglycerides  126 mg/dL (<150)   18  04:10    


 


Cholesterol  124 mg/dL (<200)   18  04:10    


 


LDL Cholesterol Direct  84 mg/dL ()   18  04:10    


 


HDL Cholesterol  30 mg/dL (23-92)   18  04:10    


 


TSH  0.75 uIU/ml (0.34-5.60)   11/20/18  04:10    


 


Vancomycin Trough  22.3 ug/mL (5-10)  H  18  07:40    


 


Helicobacter pylori Ab  NEGATIVE  (NEGATIVE)   18  08:35    


 


Blood Type  O POSITIVE   18  19:40    


 


Antibody Screen  NEGATIVE   18  19:40    


 


Crossmatch  See Detail   18  19:40    














- Physical Exam


Vitals and I&O: 


 Vital Signs











Temp  98.8 F   18 16:09


 


Pulse  105   18 16:09


 


Resp  28   18 16:09


 


BP  137/87   18 17:05


 


Pulse Ox  99   18 16:09








 Intake & Output











 18





 18:59 06:59 18:59


 


Intake Total 350 150 220


 


Output Total   2650


 


Balance 350 150 -2430


 


Weight (lbs)   67.132 kg


 


Intake:   


 


  Intake, IV Amount 350 150 100


 


    Meropenem 500 mg In  100 100





    Sodium Chloride 0.9% 100   





    ml @ 100 mls/hr IV Q8H   





    [facility] Rx#:[id number]   


 


    Piperacillin Sodium/ 100 50 





    Tazobact 2.25 gm In   





    Sodium Chloride 0.9% 50   





    ml @ 100 mls/hr IV Q6H   





    [facility] Rx#:[id number]   


 


    Vancomycin HCl 1.25 gm In 250  





    Sodium Chloride 0.9% 250   





    ml @ 165 mls/hr IV Q48HR   





    @0900 [facility] Rx#:[id number]   


 


  Other   120


 


Output:   


 


  Urine   2650


 


Other:   


 


  # Bowel Movements   1


 


  Weight Source   Bedscale











Active Medications: 


Current Medications





Acetaminophen (Tylenol)  650 mg PO Q4HR PRN


   PRN Reason: MILD PAIN 1-3


   Stop: 19 17:58


   Last Admin: 18 17:25 Dose:  650 mg


Acetaminophen (Tylenol)  650 mg PO Q4HR PRN


   PRN Reason: TEMP >100.4


   Stop: 19 17:58


Acetylcysteine (Mucomyst 20%)  3 ml HHN Q4HRT [facility]


   Stop: 19 14:59


   Last Admin: 18 14:21 Dose:  3 ml


Albuterol/Ipratropium (Duoneb Neb)  3 ml HHN Q4HRT [facility]


   Stop: 19 14:59


   Last Admin: 18 14:21 Dose:  3 ml


Amlodipine Besylate (Norvasc)  5 mg PO DAILY [facility]


   Stop: 19 08:59


   Last Admin: 18 09:07 Dose:  Not Given


Ascorbic Acid (Vitamin C)  500 mg PO DAILY [facility]


   Stop: 19 08:59


   Last Admin: 18 09:06 Dose:  500 mg


Atorvastatin Calcium (Lipitor)  10 mg PO HS [facility]; Protocol


   Stop: 19 20:59


   Last Admin: 18 22:30 Dose:  10 mg


Bisacodyl (Dulcolax 10 Mg Supp)  10 mg RC DAILY PRN


   PRN Reason: IF MOM INEFFECTIVE


   Stop: 19 17:58


Furosemide (Lasix)  40 mg IVP BID [facility]


   Stop: 19 16:59


   Last Admin: 18 17:05 Dose:  40 mg


Diltiazem HCl 125 mg/ Dextrose  100 mls @ 0 mls/hr IV Q8H PRN; Protocol


   PRN Reason: blood pressure


   Stop: 19 07:48


   Last Admin: 18 09:00 Dose:  6.25 mg/hr, 5 mls/hr


Vancomycin HCl 1.25 gm/ Sodium (Chloride)  250 mls @ 165 mls/hr IV Q48HR@0900 

[facility]


   Stop: 19 09:59


   Last Infusion: 18 11:28 Dose:  Infused


Meropenem 500 mg/ Sodium (Chloride)  100 mls @ 100 mls/hr IV Q8H [facility]


   Stop: 19 00:59


   Last Admin: 18 17:02 Dose:  100 mls/hr


Sodium Chloride (Nacl 0.45%)  1,000 mls @ 30 mls/hr IV .Q24H [facility]


   Stop: 19 09:55


   Last Admin: 18 11:44 Dose:  30 mls/hr


Insulin Aspart (Novolog Insulin Sliding Scale)  0 units SUBQ Q6HR [facility]; Protocol


   Stop: 19 01:14


   Last Admin: 18 11:53 Dose:  12 units


Lactobacillus Rhamnosus (Culturelle 15b)  1 each PO DAILY [facility]


   Stop: 19 08:59


   Last Admin: 18 09:06 Dose:  1 each


Lorazepam (Ativan)  1 mg IVP Q4HR PRN; Protocol


   PRN Reason: Agitation


   Stop: 19 07:12


   Last Admin: 18 23:45 Dose:  1 mg


Methylprednisolone Sodium Succinate (Solu-Medrol)  40 mg IVP Q12HR [facility]


   Stop: 19 08:59


   Last Admin: 18 09:03 Dose:  40 mg


Miscellaneous (Probiotic Screen)  1 [facility] PRN PRN


   PRN Reason: PROTOCOL


   Stop: 19 15:29


Miscellaneous (Vancomycin Iv Per Pharmacy)  1 [facility] PRN PATRICE


   Stop: 19 23:44


Miscellaneous (Zosyn Iv Per Pharmacy)  1 [facility] PRN PRN


   PRN Reason: PROTOCOL


   Stop: 19 12:03


Pantoprazole Sodium (Protonix)  40 mg IVP BID [facility]


   Stop: 19 08:59


   Last Admin: 18 17:01 Dose:  40 mg


Polyethylene Glycol (Miralax)  17 gm PO BID [facility]


   Stop: 19 08:59


   Last Admin: 18 17:06 Dose:  Not Given








General: no acute distress, well developed, well nourished


HEENT: atraumatic, normocephalic, PERRLA, EOMI


Neck: supple, no thyromegaly


Cardiovascular: S1S2, regular


Lungs: clear to auscultation bilaterally, clear to percussion


Abdomen: soft, no tender, no distended, no hepatomegaly


Extremities: no cyanosis, no clubbing, no edema


Neurological: awake, alert, other (lethargic)


Skin: intact





- Procedures


Procedures: 


 Procedures











Procedure Code Date


 


EXCISION OF STOMACH, PYLORUS, ENDO, DIAGN [id number] 18


 


INSERTION OF FEEDING DEVICE INTO STOMACH, ENDO 2YV47TH 18


 


RESPIRATORY VENTILATION, GREATER THAN 96 CONSECUTIVE HOURS [id number] 18














Infectious Disease Assmt/Plan





- Problem List


Patient Problems: 


All Active Problems





COFFEE GROUND EMESIS (Acute) 











- Assessment


Assessment: 





1.  Severe sepsis. improved


2.  Pneumonia. the RLL infiltrate improved.


3.  Fecal impaction.


4.  Gallbladder stone at bladder neck.


5.  Diabetes mellitus type 2.


6.  Hypertension.


7.  Dementia.


8.  Anemia.


9.  Large hiatal hernia.


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 


11. GI bleed 2/2 PUD.


12. SVT


13. Respiratory insufficiency.


14. S/p PEG placement, 


15. Altered mental status, metabolic encephalopathy.





- Plan


Plan: 





Continue zosyn.


Continue vancomycin IV.


May transfer patient to [facility] as per insurance.





 





Nutritional Asmnt/Malnutr-PDOC





- Dietary Evaluation


Malnutrition Findings (Please click <Entered> for more info): 








Nutritional Asmnt/Malnutrition                             Start:  18 15:

21


Text:                                                      Status: Complete    

  


Freq:                                                                          

  


Protocol:                                                                      

  


 Document     18 15:22  [place]  (Rec: 18 15:44  DYFORREST  [email or web address])


 Nutritional Asmnt/Malnutrition


     Patient General Information


      Nutritional Screening                      High Risk


      Diagnosis                                  sepsis, dehydration, pneumonia


      Pertinent Medical Hx/Surgical Hx           HTN, DM, dyslipidemia,


                                                 dementia, anemia, muscle


                                                 weakness


      Subjective Information                     Pt receiving care from RN at


                                                 time of visit. Per nurse notes


                                                 , pt had coffee ground emesis,


                                                 NGT was unable to be inserted


                                                 d/t large retro cardiac


                                                 hiatal hernia, and will have


                                                 EGD tomorrow. Spoke w/ LEVON Lebron by phone who verfied


                                                 the nurse notes and states pt


                                                 will continue to be NPO.


      Current Diet Order/ Nutrition Support      NPO


      Pertinent Medications                      D5-0.45ns, novolog, culturelle


                                                 , pantoprazole


      Pertinent Labs                             : glucose 392, Na 144, Cl


                                                 110, BUN 78, Alb 2.9, 


                                                 -423


                                                 : glucose 547, Na 134, Cl


                                                 97, BUN 69, Alb 3.0, -


                                                 485


     Nutritional Hx/Data


      Height                                     1.63 m


      Height (Calculated Centimeters)            162.6


      Current Weight (lbs)                       64.682 kg


      Weight (Calculated Kilograms)              64.7


      Weight (Calculated Grams)                  25464.3


      Ideal Body Weight                          120 lb


      Body Mass Index (BMI)                      24.5


      Weight Status                              Approriate


     GI Symptoms


      GI Symptoms                                Nausea


                                                 Vomitting


      Last BM                                    none noted


      Difficult in:                              None


      Food Allergies                             No


      Skin Integrity/Comment:                    angelica bee 13


      Current %PO                                Negligible < 25%


     Estimated Nutritional Goals


      BEE in Kcals:                              Using Current wt


      Calories/Kcals/Kg                          30-35


      Kcals Calculated                           5923-3546


      Protein:                                   Using Current wt


      Protein g/k.2-1.5


      Protein Calculated                         78-97 g


      Fluid: ml                                  per MD


     Nutritional Problem


      2. Problem


       Problem                                   Altered nutrition related lab


                                                 values


       Etiology                                  dehydration, endocrine


                                                 dysfunction


       Signs/Symptoms:                           Cl 110, BUN 78, glucose 392,


                                                 -423


      1. Problem


       Problem                                   Increased nutrient needs


       Etiology                                  metabolic stress


       Signs/Symptoms:                           dx of sepsis and pneumonia


     Malnutrition Related to Morbid Obesity


      Malnutrition related to morbid obesity     No


     Intervention/Recommendation


      Comments                                   1. Monitor NPO status and


                                                 advance diet when medically


                                                 appropriate


                                                 2. Consider alternative


                                                 nutrition route if pt unable


                                                 to tolerate PO intake


                                                 3. Monitor wt, nutrition


                                                 related labs, and skin


                                                 integrity


                                                 4. F/U as high risk in 2-3


                                                 days, -


     Expected Outcomes/Goals


      Expected Outcomes/Goals                    1. Pt to start oral intake or


                                                 alternate nutrition route if


                                                 necessary


                                                 2. Wt stability, skin to


                                                 remain intact, nutrition


                                                 related labs to approach


                                                 normal limits


                                                 Reveiwed by Lisa Haas RD